# Patient Record
Sex: MALE | Race: WHITE | NOT HISPANIC OR LATINO | Employment: OTHER | URBAN - METROPOLITAN AREA
[De-identification: names, ages, dates, MRNs, and addresses within clinical notes are randomized per-mention and may not be internally consistent; named-entity substitution may affect disease eponyms.]

---

## 2021-01-01 ENCOUNTER — PATIENT OUTREACH (OUTPATIENT)
Dept: HEALTH INFORMATION MANAGEMENT | Facility: OTHER | Age: 86
End: 2021-01-01

## 2021-01-01 NOTE — PROGRESS NOTES
Outcome: Left Message to call back to establish with a [facility] PCP for his SCP plan.    Please transfer to Patient Outreach Team at [phone] when patient returns call.    Attempt # 1

## 2021-01-13 DIAGNOSIS — Z23 NEED FOR VACCINATION: ICD-10-CM

## 2021-02-05 ENCOUNTER — TELEMEDICINE (OUTPATIENT)
Dept: MEDICAL GROUP | Facility: LAB | Age: 86
End: 2021-02-05
Payer: MEDICARE

## 2021-02-05 VITALS — HEART RATE: 58 BPM | WEIGHT: 150 LBS | HEIGHT: 69 IN | BODY MASS INDEX: 22.22 KG/M2

## 2021-02-05 DIAGNOSIS — E03.9 HYPOTHYROIDISM, UNSPECIFIED TYPE: ICD-10-CM

## 2021-02-05 DIAGNOSIS — Z13.6 SCREENING FOR CARDIOVASCULAR CONDITION: ICD-10-CM

## 2021-02-05 DIAGNOSIS — C61 PROSTATE CA (HCC): ICD-10-CM

## 2021-02-05 DIAGNOSIS — Z86.79 HISTORY OF IRREGULAR HEARTBEAT: ICD-10-CM

## 2021-02-05 DIAGNOSIS — Z12.5 ENCOUNTER FOR SCREENING FOR MALIGNANT NEOPLASM OF PROSTATE: ICD-10-CM

## 2021-02-05 PROBLEM — C79.51 SECONDARY ADENOCARCINOMA OF BONE (HCC): Status: ACTIVE | Noted: 2019-11-11

## 2021-02-05 PROCEDURE — 99204 OFFICE O/P NEW MOD 45 MIN: CPT | Mod: 95,CR | Performed by: FAMILY MEDICINE

## 2021-02-05 RX ORDER — ESCITALOPRAM OXALATE 10 MG/1
10 TABLET ORAL
COMMUNITY
Start: 2021-01-24 | End: 2022-12-22

## 2021-02-05 NOTE — LETTER
[facility]  Mohsen Flores M.D.  [address]  [place] [address]  Fax: [phone]   Authorization for Release/Disclosure of   Protected Health Information   Name: ARACELIS SHRESTHA : 1935 SSN: [id number]   Address: [facility] [address]  [place] [address] Phone:    [phone] (home)    I authorize the entity listed below to release/disclose the PHI below to:   [facility]/Mohsen Flores M.D. and Mohsen Flores M.D.   Provider or Entity Name:  Dr. Ruma Krishnan  [facility]   Address   [facility]  [address]  [facility] [address] Phone:  [phone]    Fax:  [phone]   Reason for request: continuity of care   Information to be released:    [  ] LAST COLONOSCOPY,  including any PATH REPORT and follow-up  [  ] LAST FIT/COLOGUARD RESULT [  ] LAST DEXA  [  ] LAST MAMMOGRAM  [  ] LAST PAP  [  ] LAST LABS [  ] RETINA EXAM REPORT  [  ] IMMUNIZATION RECORDS  [XXXXX] Release all info      [XXXXX] Check here and initial the line next to each item to release ALL health information INCLUDING  _XXXXX_ Care and treatment for drug and / or alcohol abuse  _XXXXX_ HIV testing, infection status, or AIDS  _XXXXX_ Genetic Testing    DATES OF SERVICE OR TIME PERIOD TO BE DISCLOSED: _____________  I understand and acknowledge that:  * This Authorization may be revoked at any time by you in writing, except if your health information has already been used or disclosed.  * Your health information that will be used or disclosed as a result of you signing this authorization could be re-disclosed by the recipient. If this occurs, your re-disclosed health information may no longer be protected by State or Federal laws.  * You may refuse to sign this Authorization. Your refusal will not affect your ability to obtain treatment.  * This Authorization becomes effective upon signing and will  on (date) __________.      If no date is  indicated, this Authorization will  one (1) year from the signature date.    Name: David Darling    Signature: Continuity of care  Verbal consent given during virtual video appt today Date:     2021       PLEASE FAX REQUESTED RECORDS BACK TO: [phone]

## 2021-02-05 NOTE — LETTER
[place]  Mohsen Flores M.D.  [address]  [place] [address]  Fax: [phone]   Authorization for Release/Disclosure of   Protected Health Information   Name: ARACELIS SHRESTHA : 1935 SSN: [id number]   Address: [facility] [address]  [place] [address] Phone:    [phone] (home)    I authorize the entity listed below to release/disclose the PHI below to:   [facility]/Mohsen Flores M.D. and Mohsen Flores M.D.   Provider or Entity Name:  [facility]   Address   City, State, [facility]   Phone:  [phone]    Fax:  [phone]   Reason for request: continuity of care   Information to be released:    [  ] LAST COLONOSCOPY,  including any PATH REPORT and follow-up  [  ] LAST FIT/COLOGUARD RESULT [  ] LAST DEXA  [  ] LAST MAMMOGRAM  [  ] LAST PAP  [  ] LAST LABS [  ] RETINA EXAM REPORT  [  ] IMMUNIZATION RECORDS  [XXXXX] Release all info      [XXXXX] Check here and initial the line next to each item to release ALL health information INCLUDING  _XXXXX_ Care and treatment for drug and / or alcohol abuse  _XXXXX_ HIV testing, infection status, or AIDS  _XXXXX_ Genetic Testing    DATES OF SERVICE OR TIME PERIOD TO BE DISCLOSED: _____________  I understand and acknowledge that:  * This Authorization may be revoked at any time by you in writing, except if your health information has already been used or disclosed.  * Your health information that will be used or disclosed as a result of you signing this authorization could be re-disclosed by the recipient. If this occurs, your re-disclosed health information may no longer be protected by State or Federal laws.  * You may refuse to sign this Authorization. Your refusal will not affect your ability to obtain treatment.  * This Authorization becomes effective upon signing and will  on (date) __________.      If no date is indicated, this Authorization will  one (1) year from the signature date.    Name:  David Darling    Signature: Continuity of care  Verbal consent given during virtual video appt today Date:     2/5/2021       PLEASE FAX REQUESTED RECORDS BACK TO: [phone]

## 2021-02-05 NOTE — PROGRESS NOTES
Virtual Visit: New Patient   This visit was conducted via Zoom using secure and encrypted videoconferencing technology. The patient was in a private location in the state of [place].    The patient's identity was confirmed and verbal consent was obtained for this virtual visit.    Subjective:     CC:   Chief Complaint   Patient presents with   • [facility]       David Darling is a 85 y.o. male presenting to establish care and to discuss the evaluation and management of:    Prostate CA  Following with urology - Dr. Bragg    History of irregular heartbeat   Unclear history. Reports they wanted to do pacemaker at [facility] in 2019 but he did not want to do this.  Reports he occasionally stil has irregular heart reate, no chest pin, no shortness of breath.  Also reports ascending aortic aneurysm   Has not followed with cardiology since then  On 81 mg ASA    Reports normal colonoscopy within last 3 years    ROS  Constitutional: Negative for fever, chills and malaise/fatigue.   HENT: Negative for congestion.    Eyes: Negative for pain.   Respiratory: Negative for cough and shortness of breath.    Cardiovascular: Negative for leg swelling.   Gastrointestinal: Negative for nausea, vomiting, abdominal pain and diarrhea.   Genitourinary: Negative for dysuria and hematuria.   Skin: Negative for rash.   Neurological: Negative for dizziness, focal weakness and headaches.   Endo/Heme/Allergies: Does not bruise/bleed easily.   Psychiatric/Behavioral: Negative for depression.  The patient is not nervous/anxious.      Allergies   Allergen Reactions   • Augmentin Vomiting   • Amoxicillin-Pot Clavulanate Unspecified       Current medicines (including changes today)  Current Outpatient Medications   Medication Sig Dispense Refill   • Apalutamide 60 MG Tab Erleada 60 mg tablet   Take 4 tablets every day by oral route for 30 days.     • albuterol (VENTOLIN OR PROVENTIL) 108 (90 BASE) MCG/ACT AERS Inhale 2 Puffs by mouth  "every 6 hours as needed for Shortness of Breath. 1 Inhaler 0   • levothyroxine (SYNTHROID) 75 MCG TABS Take 1 Tab by mouth every day. 90 Tab 0   • Diclofenac Sodium (VOLTAREN) 1 % GEL Apply 4 g to skin as directed 3 times a day as needed. 1 Tube 0   • Tadalafil (CIALIS) 2.5 MG TABS Take 1 Tab by mouth as needed (30 minutes prior to sexual activity). 30 Tab 1   • fluticasone (FLONASE) 50 MCG/ACT nasal spray Spray 1 Spray in nose every day. Each Nostril 1 Bottle 1   • tamsulosin (FLOMAX) 0.4 MG capsule Take 0.4 mg by mouth 2 Times a Day.       No current facility-administered medications for this visit.        He  has a past medical history of Allergy, Cancer (HCC), Muscle disorder, Sinusitis, chronic, and Strep throat.  He  has a past surgical history that includes knee arthroscopy.      Family History   Problem Relation Age of Onset   • Cancer Mother    • Heart Disease Father    • Cancer Father    • Stroke Sister      Family Status   Relation Name Status   • Mo     • Fa     • Sis     • Bro          Car accident   • MGMo     • MGFa     • PGMo     • PGFa         Patient Active Problem List    Diagnosis Date Noted   • DDD (degenerative disc disease), lumbar 2013   • Actinic keratoses 2011   • Decreased libido 2011   • Lumbar foraminal stenosis 2011   • Elevated PSA 10/10/2010   • GERD (gastroesophageal reflux disease) 10/10/2010   • Seasonal allergies 2010          Objective:   Pulse (!) 58 Comment: Verbal  Ht 1.753 m (5' 9\") Comment: Verbal  Wt 68 kg (150 lb) Comment: Verbal  BMI 22.15 kg/m²     Physical Exam:  Constitutional: Alert, no distress, well-groomed.  Skin: No rashes in visible areas.  Eye: Round. Conjunctiva clear, lids normal. No icterus.   ENMT: Lips pink without lesions, good dentition, moist mucous membranes. Phonation normal.  Neck: No masses, no thyromegaly. Moves freely without pain.  Respiratory: " Unlabored respiratory effort, no cough or audible wheeze  Psych: Alert and oriented x3, normal affect and mood.       Assessment and Plan:   The following treatment plan was discussed:     1. Prostate CA (HCC)  Encounter for screening for malignant neoplasm of prostate   Following with urology weekly.  Currently on apalutamide.  Reports he needs a PSA checked.  - CBC WITH DIFFERENTIAL; Future  - Comp Metabolic Panel; Future  - PROSTATE SPECIFIC AG SCREENING; Future    2. History of irregular heartbeat  Unclear history.  Reports he was hospitalized in 2019 at [facility] and they wanted to do pacemaker but not want to do this.  Reports continued occasional irregular heartbeat, no chest pain or shortness of breath.  We have requested records    3. Hypothyroidism, unspecified type  Continue Synthroid 75 mcg, checking TSH.  - TSH; Future    4. Screening for cardiovascular condition  - Lipid Profile; Future    Follow-up: Return in about 4 weeks (around 3/5/2021).

## 2021-02-05 NOTE — LETTER
[facility]  Mohsen Flores M.D.  [address]  [place] [address]  Fax: [phone]   Authorization for Release/Disclosure of   Protected Health Information   Name: ARACELIS SHRESTHA : 1935 SSN: [id number]   Address: [facility] [address]  [place] [address] Phone:    [phone] (home)    I authorize the entity listed below to release/disclose the PHI below to:   [facility]/Mohsen Flores M.D. and Mohsen Flores M.D.   Provider or Entity Name:  [facility]   City, [facility]  [address]  [place] [address] Phone:  [phone]    Fax:  [phone]   Reason for request: continuity of care   Information to be released:    [  ] LAST COLONOSCOPY,  including any PATH REPORT and follow-up  [  ] LAST FIT/COLOGUARD RESULT [  ] LAST DEXA  [  ] LAST MAMMOGRAM  [  ] LAST PAP  [  ] LAST LABS [  ] RETINA EXAM REPORT  [  ] IMMUNIZATION RECORDS  [XXXXX] Release all info      [XXXXX] Check here and initial the line next to each item to release ALL health information INCLUDING  _XXXXX_ Care and treatment for drug and / or alcohol abuse  _XXXXX_ HIV testing, infection status, or AIDS  _XXXXX_ Genetic Testing    DATES OF SERVICE OR TIME PERIOD TO BE DISCLOSED: _____________  I understand and acknowledge that:  * This Authorization may be revoked at any time by you in writing, except if your health information has already been used or disclosed.  * Your health information that will be used or disclosed as a result of you signing this authorization could be re-disclosed by the recipient. If this occurs, your re-disclosed health information may no longer be protected by State or Federal laws.  * You may refuse to sign this Authorization. Your refusal will not affect your ability to obtain treatment.  * This Authorization becomes effective upon signing and will  on (date) __________.      If no date is indicated, this Authorization  will  one (1) year from the signature date.    Name: David Darling    Signature: Continuity of care  Verbal Consent given during virtual video appt today Date:     2021       PLEASE FAX REQUESTED RECORDS BACK TO: [phone]

## 2021-02-24 ENCOUNTER — TELEPHONE (OUTPATIENT)
Dept: MEDICAL GROUP | Facility: LAB | Age: 86
End: 2021-02-24

## 2021-02-24 NOTE — TELEPHONE ENCOUNTER
1. Caller Name: David Darling                        Call Back Number: [phone]      How would the patient prefer to be contacted with a response: Phone call OK to leave a detailed message    Patient called and stated that he's been monitoring his pulse more often and notices that it has been getting lower when he's at home compared to when he's at the doctor's office. Patient did state that he slept most of the day yesterday and is not sure if that has anything to do with it. Patient does have an appointment with you on 03/05/2021 at 2pm to follow up form his first visit. Patient would like to know what he should do until then or if we should move the appointment up sooner.   Please advise.

## 2021-02-24 NOTE — TELEPHONE ENCOUNTER
Called and advised patient. Patient was advised of closest ER an UC locations to his home if anything got worse over the weekend. Patient was also advised to reach out to PCP.

## 2021-03-05 ENCOUNTER — APPOINTMENT (OUTPATIENT)
Dept: MEDICAL GROUP | Facility: LAB | Age: 86
End: 2021-03-05
Payer: MEDICARE

## 2021-03-08 ENCOUNTER — HOSPITAL ENCOUNTER (OUTPATIENT)
Dept: LAB | Facility: MEDICAL CENTER | Age: 86
End: 2021-03-08
Attending: PHYSICIAN ASSISTANT
Payer: MEDICARE

## 2021-03-08 ENCOUNTER — HOSPITAL ENCOUNTER (OUTPATIENT)
Dept: LAB | Facility: MEDICAL CENTER | Age: 86
End: 2021-03-08
Attending: FAMILY MEDICINE
Payer: MEDICARE

## 2021-03-08 DIAGNOSIS — C61 PROSTATE CA (HCC): ICD-10-CM

## 2021-03-08 DIAGNOSIS — Z13.6 SCREENING FOR CARDIOVASCULAR CONDITION: ICD-10-CM

## 2021-03-08 DIAGNOSIS — E03.9 HYPOTHYROIDISM, UNSPECIFIED TYPE: ICD-10-CM

## 2021-03-08 DIAGNOSIS — Z12.5 ENCOUNTER FOR SCREENING FOR MALIGNANT NEOPLASM OF PROSTATE: ICD-10-CM

## 2021-03-08 LAB
ALBUMIN SERPL BCP-MCNC: 4.2 G/DL (ref 3.2–4.9)
ALBUMIN/GLOB SERPL: 1.8 G/DL
ALP SERPL-CCNC: 77 U/L (ref 30–99)
ALT SERPL-CCNC: 8 U/L (ref 2–50)
ANION GAP SERPL CALC-SCNC: 10 MMOL/L (ref 7–16)
AST SERPL-CCNC: 13 U/L (ref 12–45)
BASOPHILS # BLD AUTO: 0.6 % (ref 0–1.8)
BASOPHILS # BLD: 0.03 K/UL (ref 0–0.12)
BILIRUB SERPL-MCNC: 0.5 MG/DL (ref 0.1–1.5)
BUN SERPL-MCNC: 21 MG/DL (ref 8–22)
CALCIUM SERPL-MCNC: 9.4 MG/DL (ref 8.4–10.2)
CHLORIDE SERPL-SCNC: 104 MMOL/L (ref 96–112)
CHOLEST SERPL-MCNC: 139 MG/DL (ref 100–199)
CO2 SERPL-SCNC: 26 MMOL/L (ref 20–33)
CREAT SERPL-MCNC: 0.89 MG/DL (ref 0.5–1.4)
EOSINOPHIL # BLD AUTO: 0.2 K/UL (ref 0–0.51)
EOSINOPHIL NFR BLD: 4.1 % (ref 0–6.9)
ERYTHROCYTE [DISTWIDTH] IN BLOOD BY AUTOMATED COUNT: 44.5 FL (ref 35.9–50)
GLOBULIN SER CALC-MCNC: 2.4 G/DL (ref 1.9–3.5)
GLUCOSE SERPL-MCNC: 104 MG/DL (ref 65–99)
HCT VFR BLD AUTO: 37.8 % (ref 42–52)
HDLC SERPL-MCNC: 63 MG/DL
HGB BLD-MCNC: 12.5 G/DL (ref 14–18)
IMM GRANULOCYTES # BLD AUTO: 0.02 K/UL (ref 0–0.11)
IMM GRANULOCYTES NFR BLD AUTO: 0.4 % (ref 0–0.9)
LDLC SERPL CALC-MCNC: 65 MG/DL
LYMPHOCYTES # BLD AUTO: 1.01 K/UL (ref 1–4.8)
LYMPHOCYTES NFR BLD: 20.7 % (ref 22–41)
MCH RBC QN AUTO: 30.7 PG (ref 27–33)
MCHC RBC AUTO-ENTMCNC: 33.1 G/DL (ref 33.7–35.3)
MCV RBC AUTO: 92.9 FL (ref 81.4–97.8)
MONOCYTES # BLD AUTO: 0.66 K/UL (ref 0–0.85)
MONOCYTES NFR BLD AUTO: 13.6 % (ref 0–13.4)
NEUTROPHILS # BLD AUTO: 2.95 K/UL (ref 1.82–7.42)
NEUTROPHILS NFR BLD: 60.6 % (ref 44–72)
NRBC # BLD AUTO: 0 K/UL
NRBC BLD-RTO: 0 /100 WBC
PLATELET # BLD AUTO: 224 K/UL (ref 164–446)
PMV BLD AUTO: 12.8 FL (ref 9–12.9)
POTASSIUM SERPL-SCNC: 4.5 MMOL/L (ref 3.6–5.5)
PROT SERPL-MCNC: 6.6 G/DL (ref 6–8.2)
RBC # BLD AUTO: 4.07 M/UL (ref 4.7–6.1)
SODIUM SERPL-SCNC: 140 MMOL/L (ref 135–145)
TESTOST SERPL-MCNC: <10 NG/DL (ref 175–781)
TRIGL SERPL-MCNC: 56 MG/DL (ref 0–149)
TSH SERPL DL<=0.005 MIU/L-ACNC: 6.93 UIU/ML (ref 0.38–5.33)
WBC # BLD AUTO: 4.9 K/UL (ref 4.8–10.8)

## 2021-03-08 PROCEDURE — 84403 ASSAY OF TOTAL TESTOSTERONE: CPT

## 2021-03-08 PROCEDURE — 85025 COMPLETE CBC W/AUTO DIFF WBC: CPT

## 2021-03-08 PROCEDURE — 36415 COLL VENOUS BLD VENIPUNCTURE: CPT

## 2021-03-08 PROCEDURE — 84443 ASSAY THYROID STIM HORMONE: CPT

## 2021-03-08 PROCEDURE — 84153 ASSAY OF PSA TOTAL: CPT

## 2021-03-08 PROCEDURE — 80061 LIPID PANEL: CPT

## 2021-03-08 PROCEDURE — 80053 COMPREHEN METABOLIC PANEL: CPT

## 2021-03-09 ENCOUNTER — TELEPHONE (OUTPATIENT)
Dept: MEDICAL GROUP | Facility: LAB | Age: 86
End: 2021-03-09

## 2021-03-09 LAB — PSA SERPL-MCNC: <0.02 NG/ML (ref 0–4)

## 2021-03-09 NOTE — TELEPHONE ENCOUNTER
ESTABLISHED PATIENT PRE-VISIT PLANNING     Patient was NOT contacted to complete PVP.     Note: Patient will not be contacted if there is no indication to call.     1.  Reviewed notes from the last few office visits within the medical group: Yes    2.  If any orders were placed at last visit or intended to be done for this visit (i.e. 6 mos follow-up), do we have Results/Consult Notes?         •  Labs - Labs ordered, completed on 3/8/2021 and results are in chart.  Note: If patient appointment is for lab review and patient did not complete labs, check with provider if OK to reschedule patient until labs completed.       •  Imaging - Imaging was not ordered at last office visit.       •  Referrals - No referrals were ordered at last office visit.    3. Is this appointment scheduled as a Hospital Follow-Up? No    4.  Immunizations were updated in Epic using Reconcile Outside Information activity? Yes    5.  Patient is due for the following Health Maintenance Topics:   Health Maintenance Due   Topic Date Due   • Annual Wellness Visit  Never done   • IMM DTaP/Tdap/Td Vaccine (1 - Tdap) Never done   • IMM ZOSTER VACCINES (1 of 2) Never done   • IMM PNEUMOCOCCAL VACCINE: 65+ Years (1 of 1 - PPSV23) Never done   • COVID-19 Vaccine (2 of 2 - Moderna series) 03/13/2021       6.  AHA (Pulse8) form printed for Provider?Yes, printed

## 2021-03-16 ENCOUNTER — TELEMEDICINE (OUTPATIENT)
Dept: MEDICAL GROUP | Facility: LAB | Age: 86
End: 2021-03-16
Payer: MEDICARE

## 2021-03-16 VITALS — HEART RATE: 48 BPM | BODY MASS INDEX: 22.73 KG/M2 | WEIGHT: 150 LBS | HEIGHT: 68 IN

## 2021-03-16 DIAGNOSIS — E03.9 HYPOTHYROIDISM, UNSPECIFIED TYPE: ICD-10-CM

## 2021-03-16 DIAGNOSIS — D64.9 NORMOCYTIC ANEMIA: ICD-10-CM

## 2021-03-16 DIAGNOSIS — C61 PROSTATE CA (HCC): ICD-10-CM

## 2021-03-16 DIAGNOSIS — R00.1 BRADYCARDIA: ICD-10-CM

## 2021-03-16 DIAGNOSIS — C79.51 SECONDARY ADENOCARCINOMA OF BONE (HCC): ICD-10-CM

## 2021-03-16 PROCEDURE — 99214 OFFICE O/P EST MOD 30 MIN: CPT | Mod: 95,CR | Performed by: FAMILY MEDICINE

## 2021-03-16 RX ORDER — LEVOTHYROXINE SODIUM 88 UG/1
88 TABLET ORAL
Qty: 30 TABLET | Refills: 1 | Status: SHIPPED | OUTPATIENT
Start: 2021-03-16 | End: 2021-04-12

## 2021-03-16 ASSESSMENT — FIBROSIS 4 INDEX: FIB4 SCORE: 1.72

## 2021-03-16 NOTE — PROGRESS NOTES
Annual Health Assessment Questions:    1.  Are you currently engaging in any exercise or physical activity? Yes  Stretches every morning  2.  How would you describe your mood or emotional well-being today? depressed    3.  Have you had any falls in the last year? No    4.  Have you noticed any problems with your balance or had difficulty walking? Yes  On occasion  5.  In the last six months have you experienced any leakage of urine? No    6. DPA/Advanced Directive: Patient does not have an Advanced Directive.  A packet and workshop information was given on Advanced Directives.   Packet will be mailed to patient in the mail per his request

## 2021-03-16 NOTE — PROGRESS NOTES
Virtual Visit: Established Patient   This visit was conducted via Zoom using secure and encrypted videoconferencing technology. The patient was in a private location in the [facility] [place].    The patient's identity was confirmed and verbal consent was obtained for this virtual visit.    Subjective:   CC:   Chief Complaint   Patient presents with   • Lab Results   • Medication Management     Possible increase of levothyroxine   • Seasonal Allergies       David Darling is a 85 y.o. male presenting for evaluation and management of:    Hypothyroid  TSH elevated at 6.9.  He has felt fatigued, cold, constipated.  Has been stable on 75 mcg Synthroid for some time.    Prostate cancer  Following with urology, does have bone metastases.  He is currently on Erleada    Bradycardia  Home pulse 48 today, he has been stable in the 50s for some time.  Possible mild lightheadedness.  He reports that cardiology may have recommended pacemaker in the past.    Previous records from hospitalization at [facility] in 2019 showed instance of A. fib but spontaneously resolved and did not recur.  Had a normal echo and stress test.  They recommended outpatient cardiology follow-up.    ROS   No chest pain, no shortness of breath.    Allergies   Allergen Reactions   • Augmentin Vomiting   • Amoxicillin-Pot Clavulanate Unspecified       Current medicines (including changes today)  Current Outpatient Medications   Medication Sig Dispense Refill   • Apalutamide 60 MG Tab Erleada 60 mg tablet   Take 4 tablets every day by oral route for 30 days.     • escitalopram (LEXAPRO) 10 MG Tab Take 10 mg by mouth every day.     • albuterol (VENTOLIN OR PROVENTIL) 108 (90 BASE) MCG/ACT AERS Inhale 2 Puffs by mouth every 6 hours as needed for Shortness of Breath. 1 Inhaler 0   • levothyroxine (SYNTHROID) 75 MCG TABS Take 1 Tab by mouth every day. 90 Tab 0   • Diclofenac Sodium (VOLTAREN) 1 % GEL Apply 4 g to skin as directed 3 times a day as needed.  "1 Tube 0   • Tadalafil (CIALIS) 2.5 MG TABS Take 1 Tab by mouth as needed (30 minutes prior to sexual activity). 30 Tab 1   • fluticasone (FLONASE) 50 MCG/ACT nasal spray Spray 1 Spray in nose every day. Each Nostril 1 Bottle 1   • tamsulosin (FLOMAX) 0.4 MG capsule Take 0.4 mg by mouth 2 Times a Day.       No current facility-administered medications for this visit.       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  He  has a past surgical history that includes knee arthroscopy.       Objective:   Pulse (!) 48 Comment: Verbal  Ht 1.727 m (5' 8\") Comment: Verbal  Wt 68 kg (150 lb) Comment: Verbal  BMI 22.81 kg/m²     Physical Exam:  Constitutional: Alert, no distress, well-groomed.  Skin: No rashes in visible areas.   Eye: Round. Conjunctiva clear, lids normal. No icterus.   ENMT: Lips pink without lesions, good dentition, moist mucous membranes. Phonation normal.  Neck: No masses, no thyromegaly. Moves freely without pain.  Respiratory: Unlabored respiratory effort, no cough or audible wheeze  Psych: Alert and oriented x3, normal affect and mood.       Assessment and Plan:   The following treatment plan was discussed:     1. 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  Continues on Erleada.    3. Hypothyroidism, unspecified type  Recent " TSH elevated and symptomatic with fatigue, cold intolerance, and constipation.  Increasing Synthroid to 88 mcg daily, recheck TSH in 4 weeks.  - levothyroxine (SYNTHROID) 88 MCG Tab; Take 1 tablet by mouth Every morning on an empty stomach.  Dispense: 30 tablet; Refill: 1  - TSH; Future    4. Normocytic anemia  Hemoglobin 12.5, no prior for comparison.  Suspect likely secondary to bony mets or Erleada.  We will continue to monitor.  - CBC WITH DIFFERENTIAL; Future    5. Bradycardia  Home pulse 48 today.  Have not had a recent in person pulse.  Possible mild lightheadedness.  He does report that cardiology may have recommended pacemaker in the past.  He will schedule in person visit for heart rate check and possible EKG and we can consider cardiology referral at that time.    Follow-up: Return in about 2 weeks (around 3/30/2021).

## 2021-03-17 ENCOUNTER — TELEPHONE (OUTPATIENT)
Dept: MEDICAL GROUP | Facility: LAB | Age: 86
End: 2021-03-17

## 2021-03-17 NOTE — TELEPHONE ENCOUNTER
ESTABLISHED PATIENT PRE-VISIT PLANNING     Patient was NOT contacted to complete PVP.     Note: Patient will not be contacted if there is no indication to call.     1.  Reviewed notes from the last few office visits within the medical group: Yes    2.  If any orders were placed at last visit or intended to be done for this visit (i.e. 6 mos follow-up), do we have Results/Consult Notes?         •  Labs - Labs ordered, NOT completed. Patient advised to complete prior to next appointment.  Note: If patient appointment is for lab review and patient did not complete labs, check with provider if OK to reschedule patient until labs completed.       •  Imaging - Imaging was not ordered at last office visit.       •  Referrals - No referrals were ordered at last office visit.    3. Is this appointment scheduled as a Hospital Follow-Up? No    4.  Immunizations were updated in Epic using Reconcile Outside Information activity? Yes    5.  Patient is due for the following Health Maintenance Topics:   Health Maintenance Due   Topic Date Due   • Annual Wellness Visit  Never done   • IMM DTaP/Tdap/Td Vaccine (1 - Tdap) Never done   • IMM ZOSTER VACCINES (1 of 2) Never done   • IMM PNEUMOCOCCAL VACCINE: 65+ Years (1 of 1 - PPSV23) Never done   • COVID-19 Vaccine (2 of 2 - Moderna series) 03/13/2021         6.  AHA (Pulse8) form printed for Provider? No, already completed 3/16/21

## 2021-03-22 ENCOUNTER — HOSPITAL ENCOUNTER (OUTPATIENT)
Dept: LAB | Facility: MEDICAL CENTER | Age: 86
End: 2021-03-22
Attending: FAMILY MEDICINE
Payer: MEDICARE

## 2021-03-22 DIAGNOSIS — D64.9 NORMOCYTIC ANEMIA: ICD-10-CM

## 2021-03-22 DIAGNOSIS — E03.9 HYPOTHYROIDISM, UNSPECIFIED TYPE: ICD-10-CM

## 2021-03-22 LAB
BASOPHILS # BLD AUTO: 0.8 % (ref 0–1.8)
BASOPHILS # BLD: 0.04 K/UL (ref 0–0.12)
EOSINOPHIL # BLD AUTO: 0.26 K/UL (ref 0–0.51)
EOSINOPHIL NFR BLD: 5.3 % (ref 0–6.9)
ERYTHROCYTE [DISTWIDTH] IN BLOOD BY AUTOMATED COUNT: 44.2 FL (ref 35.9–50)
HCT VFR BLD AUTO: 37.5 % (ref 42–52)
HGB BLD-MCNC: 12.2 G/DL (ref 14–18)
IMM GRANULOCYTES # BLD AUTO: 0.01 K/UL (ref 0–0.11)
IMM GRANULOCYTES NFR BLD AUTO: 0.2 % (ref 0–0.9)
LYMPHOCYTES # BLD AUTO: 1.22 K/UL (ref 1–4.8)
LYMPHOCYTES NFR BLD: 24.9 % (ref 22–41)
MCH RBC QN AUTO: 30 PG (ref 27–33)
MCHC RBC AUTO-ENTMCNC: 32.5 G/DL (ref 33.7–35.3)
MCV RBC AUTO: 92.4 FL (ref 81.4–97.8)
MONOCYTES # BLD AUTO: 0.64 K/UL (ref 0–0.85)
MONOCYTES NFR BLD AUTO: 13.1 % (ref 0–13.4)
NEUTROPHILS # BLD AUTO: 2.73 K/UL (ref 1.82–7.42)
NEUTROPHILS NFR BLD: 55.7 % (ref 44–72)
NRBC # BLD AUTO: 0 K/UL
NRBC BLD-RTO: 0 /100 WBC
PLATELET # BLD AUTO: 239 K/UL (ref 164–446)
PMV BLD AUTO: 12.2 FL (ref 9–12.9)
RBC # BLD AUTO: 4.06 M/UL (ref 4.7–6.1)
TSH SERPL DL<=0.005 MIU/L-ACNC: 5.16 UIU/ML (ref 0.38–5.33)
WBC # BLD AUTO: 4.9 K/UL (ref 4.8–10.8)

## 2021-03-22 PROCEDURE — 85025 COMPLETE CBC W/AUTO DIFF WBC: CPT

## 2021-03-22 PROCEDURE — 84443 ASSAY THYROID STIM HORMONE: CPT

## 2021-03-22 PROCEDURE — 36415 COLL VENOUS BLD VENIPUNCTURE: CPT

## 2021-03-23 ENCOUNTER — OFFICE VISIT (OUTPATIENT)
Dept: MEDICAL GROUP | Facility: LAB | Age: 86
End: 2021-03-23
Payer: MEDICARE

## 2021-03-23 VITALS
TEMPERATURE: 97.8 F | WEIGHT: 156 LBS | OXYGEN SATURATION: 99 % | DIASTOLIC BLOOD PRESSURE: 58 MMHG | BODY MASS INDEX: 23.64 KG/M2 | HEART RATE: 60 BPM | SYSTOLIC BLOOD PRESSURE: 116 MMHG | HEIGHT: 68 IN | RESPIRATION RATE: 12 BRPM

## 2021-03-23 DIAGNOSIS — E03.9 HYPOTHYROIDISM, UNSPECIFIED TYPE: ICD-10-CM

## 2021-03-23 DIAGNOSIS — D64.9 NORMOCYTIC ANEMIA: ICD-10-CM

## 2021-03-23 DIAGNOSIS — R09.82 POST-NASAL DRIP: ICD-10-CM

## 2021-03-23 DIAGNOSIS — R00.1 BRADYCARDIA: ICD-10-CM

## 2021-03-23 PROCEDURE — 99213 OFFICE O/P EST LOW 20 MIN: CPT | Mod: 25 | Performed by: FAMILY MEDICINE

## 2021-03-23 PROCEDURE — 93000 ELECTROCARDIOGRAM COMPLETE: CPT | Performed by: FAMILY MEDICINE

## 2021-03-23 ASSESSMENT — FIBROSIS 4 INDEX: FIB4 SCORE: 1.62

## 2021-03-23 ASSESSMENT — ENCOUNTER SYMPTOMS
CHILLS: 0
SORE THROAT: 0
WHEEZING: 0
FEVER: 0
SHORTNESS OF BREATH: 0

## 2021-03-23 NOTE — PROGRESS NOTES
"Subjective:     CC: Bradycardia, nasal congestion    HPI:   David presents today with:    Bradycardia  Chronic problem, has been discussed at multiple virtual visits.  Following up today for in person exam and EKG.    He had a previous work-up at an in and see for chest pain and A. fib.  Had an episode of spontaneous A. fib that resolved and did not recur.  Had normal echo and stress test.  He continue to follow with cardiology as an outpatient.  He reports they recommended a pacemaker at one point but did not have a stable living situation did not follow-up on this.    Currently denies chest pain, shortness of breath, lightheadedness.    Nasal congestion  Reports chronic issues with continually runny nose, will symptoms drip down the back of his throat and causes mild cough.    Medications, past medical history, allergies, and social history have been reviewed and updated.    ROS:  Review of Systems   Constitutional: Negative for chills and fever.   HENT: Negative for sore throat.    Respiratory: Negative for shortness of breath and wheezing.         Objective:       Exam:  /58 (BP Location: Left arm, Patient Position: Sitting, BP Cuff Size: Adult)   Pulse 60   Temp 36.6 °C (97.8 °F)   Resp 12   Ht 1.727 m (5' 8\")   Wt 70.8 kg (156 lb)   SpO2 99%   BMI 23.72 kg/m²  Body mass index is 23.72 kg/m².    Constitutional: Alert. Well appearing. No distress.  Skin: Warm, dry, good turgor, no visible rashes.  Eye: Equal, round and reactive to light, conjunctiva clear, lids normal.  ENMT: Moist mucous membranes.   Respiratory: Normal effort. Lungs are clear to auscultation bilaterally.  Cardiovascular: Bradycardic.  Normal S1/S2. No murmurs, rubs or gallops.   Ext: Trace lower extremity edema.  Neuro: Moves all four extremities. No facial droop.  Psych: Answers questions appropriately. Normal affect and mood.    EKG interpretation by me: Sinus bradycardia. HR is 40. Axis is normal.  Prolonged MA interval.  " Probable RBBB.    Assessment & Plan:     85 y.o. male with the following -     1. Bradycardia  Chronic issue, he previously followed with cardiology at [facility].  Pacemaker may have been recommended at one point but he is not able to follow-up due to social circumstances.  EKG today consistent with sinus bradycardia.  Does not appear to have significant symptoms.  We will send referral to cardiology to discuss further treatment.    2. Hypothyroidism, unspecified type  Recently increased Synthroid dosage, will recheck TSH.  - TSH; Future    3. Normocytic anemia  Likely secondary to bony mets or early ETA.  Continue to monitor.  - CBC WITH DIFFERENTIAL; Future    4. Post-nasal drip  Trial Flonase.      Please note that this note was created using voice recognition software.

## 2021-03-25 ENCOUNTER — TELEPHONE (OUTPATIENT)
Dept: MEDICAL GROUP | Facility: LAB | Age: 86
End: 2021-03-25

## 2021-03-25 NOTE — TELEPHONE ENCOUNTER
Pt has been informed. He would like more clarification to low heart rate. He said you may have spoken to him at his visit but does not remember.

## 2021-04-06 ENCOUNTER — TELEMEDICINE (OUTPATIENT)
Dept: CARDIOLOGY | Facility: MEDICAL CENTER | Age: 86
End: 2021-04-06
Attending: FAMILY MEDICINE
Payer: MEDICARE

## 2021-04-06 VITALS — BODY MASS INDEX: 23.64 KG/M2 | WEIGHT: 156 LBS | HEIGHT: 68 IN

## 2021-04-06 DIAGNOSIS — I45.10 RBBB: ICD-10-CM

## 2021-04-06 DIAGNOSIS — R00.1 SINUS BRADYCARDIA: ICD-10-CM

## 2021-04-06 DIAGNOSIS — R00.1 BRADYCARDIA: ICD-10-CM

## 2021-04-06 PROCEDURE — 99204 OFFICE O/P NEW MOD 45 MIN: CPT | Performed by: INTERNAL MEDICINE

## 2021-04-06 RX ORDER — ASPIRIN 81 MG/1
81 TABLET ORAL EVERY EVENING
COMMUNITY
End: 2022-02-08

## 2021-04-06 ASSESSMENT — FIBROSIS 4 INDEX: FIB4 SCORE: 1.62

## 2021-04-06 NOTE — PROGRESS NOTES
Virtual Visit: New Patient   This visit was conducted via Zoom using secure and encrypted videoconferencing technology. The patient was in a private location in the state of [place].    The patient's identity was confirmed and verbal consent was obtained for this virtual visit.    Subjective:     CC:   Chief Complaint   Patient presents with   • Bradycardia   • Irregular Heart Beat       David Darling is a 85 y.o. male presenting to establish care and to discuss the evaluation and management of: bradycardia    He has metastatic prostate CA but well controlled   He was diagnosed in AF in the past  In October 2019, he was admitted at [facility]  MPI and ECHO were unremarkable.    Seen by EP at [facility] once but not lately.  There was some discussion about pacemaker.    He recently switched primary care provider.  He was physically seen in the office for the first time 2 weeks ago he was noted to be bradycardic.    EKG was obtained and by my review it shows sinus bradycardia with heart rate of 39 bpm with right bundle branch block and first-degree AV block with a KS interval of 186 bpm.  There is no prior EKG for comparison.    He denies dizziness or syncope.  He think that his energy level may be elevated low over the past 6 months but probably still able to do most of normal routine.  He has not been able to do much outdoor activities lately due to COVID-19.    ROS  See HPI  Constitutional: Negative for fever, chills.   HENT: Negative for congestion.    Eyes: Negative for pain.   Respiratory: Negative for cough and shortness of breath.    Cardiovascular: Negative for leg swelling.   Gastrointestinal: Negative for nausea, vomiting, abdominal pain and diarrhea.   Genitourinary: Negative for dysuria and hematuria.   Skin: Negative for rash.   Neurological: Negative for dizziness, focal weakness and headaches.   Endo/Heme/Allergies: Does not bruise/bleed easily.   Psychiatric/Behavioral: Negative for depression.  The patient  is not nervous/anxious.      Allergies   Allergen Reactions   • Augmentin Vomiting   • Amoxicillin-Pot Clavulanate Unspecified       Current medicines (including changes today)  Current Outpatient Medications   Medication Sig Dispense Refill   • aspirin 81 MG EC tablet Take 81 mg by mouth every evening.     • Coenzyme Q10 (CO Q 10 PO) Take  by mouth every day.     • levothyroxine (SYNTHROID) 88 MCG Tab Take 1 tablet by mouth Every morning on an empty stomach. 30 tablet 1   • Apalutamide 60 MG Tab Erleada 60 mg tablet   Take 4 tablets every day by oral route for 30 days.     • escitalopram (LEXAPRO) 10 MG Tab Take 10 mg by mouth every day.     • albuterol (VENTOLIN OR PROVENTIL) 108 (90 BASE) MCG/ACT AERS Inhale 2 Puffs by mouth every 6 hours as needed for Shortness of Breath. 1 Inhaler 0   • Diclofenac Sodium (VOLTAREN) 1 % GEL Apply 4 g to skin as directed 3 times a day as needed. 1 Tube 0   • Tadalafil (CIALIS) 2.5 MG TABS Take 1 Tab by mouth as needed (30 minutes prior to sexual activity). 30 Tab 1   • fluticasone (FLONASE) 50 MCG/ACT nasal spray Spray 1 Spray in nose every day. Each Nostril (Patient taking differently: Administer 1 Spray into affected nostril(S) as needed. Each Nostril) 1 Bottle 1   • tamsulosin (FLOMAX) 0.4 MG capsule Take 0.4 mg by mouth every day.       No current facility-administered medications for this visit.       He  has a past medical history of Allergy, Cancer (HCC), Muscle disorder, Sinusitis, chronic, and Strep throat.  He  has a past surgical history that includes knee arthroscopy.      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  Skin: No rashes in visible areas.  Eye: Round. Conjunctiva clear, lids normal. No icterus.   ENMT: Lips pink without lesions, good dentition, moist mucous membranes. Phonation normal.  Neck: No masses, no thyromegaly. Moves freely without pain.  Respiratory: Unlabored respiratory effort, no cough or audible wheeze  Psych: Alert and oriented x3, normal affect and mood.       Assessment and Plan:   The following treatment plan was discussed:     1. Bradycardia, sinus likely sick sinus syndrome  Relatively asymptomatic. He also has stage IV prostate cancer.  He is hesitant to undergo pacemaker implantation.  We will further assess his bradycardia arrhythmia with 24-hour Holter monitor.    2. 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  We will keep you posted about our findings and further recommendations as they become available. Please also do not hesitate to call for any questions.  Thank you kindly for allowing me to participate in the care of this patient.    Follow-up: No follow-ups on file.         "

## 2021-04-09 ENCOUNTER — PATIENT MESSAGE (OUTPATIENT)
Dept: HEALTH INFORMATION MANAGEMENT | Facility: OTHER | Age: 86
End: 2021-04-09

## 2021-04-12 DIAGNOSIS — E03.9 HYPOTHYROIDISM, UNSPECIFIED TYPE: ICD-10-CM

## 2021-04-13 RX ORDER — LEVOTHYROXINE SODIUM 88 UG/1
TABLET ORAL
Qty: 90 TABLET | Refills: 3 | Status: SHIPPED | OUTPATIENT
Start: 2021-04-13 | End: 2022-12-15 | Stop reason: SDUPTHER

## 2021-04-23 ENCOUNTER — PATIENT OUTREACH (OUTPATIENT)
Dept: HEALTH INFORMATION MANAGEMENT | Facility: OTHER | Age: 86
End: 2021-04-23

## 2021-04-23 NOTE — PROGRESS NOTES
Outcome:will call back to scheduled Comprehensive Geriatric Assessment    Please transfer to Patient Outreach Team at [phone] when patient returns call.    Attempt #2

## 2021-05-06 ENCOUNTER — OFFICE VISIT (OUTPATIENT)
Dept: URGENT CARE | Facility: CLINIC | Age: 86
End: 2021-05-06
Payer: MEDICARE

## 2021-05-06 ENCOUNTER — APPOINTMENT (OUTPATIENT)
Dept: RADIOLOGY | Facility: IMAGING CENTER | Age: 86
End: 2021-05-06
Attending: PHYSICIAN ASSISTANT
Payer: MEDICARE

## 2021-05-06 VITALS
RESPIRATION RATE: 16 BRPM | SYSTOLIC BLOOD PRESSURE: 118 MMHG | BODY MASS INDEX: 23.64 KG/M2 | OXYGEN SATURATION: 99 % | HEART RATE: 46 BPM | TEMPERATURE: 97.6 F | WEIGHT: 156 LBS | DIASTOLIC BLOOD PRESSURE: 60 MMHG | HEIGHT: 68 IN

## 2021-05-06 DIAGNOSIS — T14.8XXA AVULSION OF SKIN: ICD-10-CM
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
## 2021-05-06 DIAGNOSIS — S69.91XA INJURY OF RIGHT WRIST, INITIAL ENCOUNTER: ICD-10-CM

## 2021-05-06 DIAGNOSIS — S52.571A OTHER CLOSED INTRA-ARTICULAR FRACTURE OF DISTAL END OF RIGHT RADIUS, INITIAL ENCOUNTER: ICD-10-CM

## 2021-05-06 DIAGNOSIS — R00.1 BRADYCARDIA: ICD-10-CM

## 2021-05-06 PROCEDURE — 99214 OFFICE O/P EST MOD 30 MIN: CPT | Mod: 25 | Performed by: PHYSICIAN ASSISTANT

## 2021-05-06 PROCEDURE — 90715 TDAP VACCINE 7 YRS/> IM: CPT | Performed by: PHYSICIAN ASSISTANT

## 2021-05-06 PROCEDURE — 90471 IMMUNIZATION ADMIN: CPT | Performed by: PHYSICIAN ASSISTANT

## 2021-05-06 PROCEDURE — 73110 X-RAY EXAM OF WRIST: CPT | Mod: TC,FY,RT | Performed by: PHYSICIAN ASSISTANT

## 2021-05-06 RX ORDER — TRAZODONE HYDROCHLORIDE 150 MG/1
150 TABLET ORAL
COMMUNITY
Start: 2021-03-18 | End: 2021-09-01

## 2021-05-06 ASSESSMENT — ENCOUNTER SYMPTOMS
DIARRHEA: 0
FEVER: 0
CHILLS: 0
DIZZINESS: 0
VOMITING: 0
SHORTNESS OF BREATH: 0
FALLS: 1
NAUSEA: 0
ABDOMINAL PAIN: 0

## 2021-05-06 ASSESSMENT — FIBROSIS 4 INDEX: FIB4 SCORE: 1.62

## 2021-05-07 NOTE — PROGRESS NOTES
"Subjective:      David Darling is a 85 y.o. male who presents with Puncture Wound (lt hand x2 happened 4 days ago ) and Wound Check (rt arm x4 days )            HPI  Patient presents to urgent care reporting a fall that occurred 4 days ago. He tripped and fell landing on his right wrist. He reports pain and swelling of the wrist since that time. He is right hand dominant. No prior wrist injuries. No distal numbness/tingling. He also obtained a skin tear to his right forearm and also a small puncture wound on his left palm. Tdap is out of date.       Review of Systems   Constitutional: Negative for chills and fever.   HENT: Negative for congestion.    Respiratory: Negative for shortness of breath.    Cardiovascular: Negative for chest pain.   Gastrointestinal: Negative for abdominal pain, diarrhea, nausea and vomiting.   Genitourinary: Negative.    Musculoskeletal: Positive for falls.        + right wrist pain   Skin: Negative for rash.        + skin avulsion   Neurological: Negative for dizziness.        Objective:     /60   Pulse (!) 46   Temp 36.4 °C (97.6 °F) (Temporal)   Resp 16   Ht 1.727 m (5' 8\")   Wt 70.8 kg (156 lb)   SpO2 99%   BMI 23.72 kg/m²        Physical Exam  Vitals and nursing note reviewed.   Constitutional:       General: He is not in acute distress.     Appearance: Normal appearance. He is well-developed.   HENT:      Head: Normocephalic and atraumatic.      Right Ear: External ear normal.      Left Ear: External ear normal.      Nose: Nose normal.   Eyes:      Conjunctiva/sclera: Conjunctivae normal.   Cardiovascular:      Rate and Rhythm: Normal rate.   Pulmonary:      Effort: Pulmonary effort is normal.   Musculoskeletal:      Right wrist: Tenderness and bony tenderness present. No snuff box tenderness. Decreased range of motion.      Cervical back: Normal range of motion.      Comments: + TTP over distal aspect of right radius. Distally n/v intact.    Skin:     General: Skin " is warm and dry.             Comments: Superficial skin tear over extensor aspect of right forearm.     Superficial puncture wound on right palm.    Neurological:      Mental Status: He is alert and oriented to person, place, and time.   Psychiatric:         Behavior: Behavior normal.          PMH:  has a past medical history of Allergy, Cancer (HCC), Muscle disorder, Sinusitis, chronic, and Strep throat.  MEDS:   Current Outpatient Medications:   •  traZODone (DESYREL) 150 MG Tab, Take 150 mg by mouth., Disp: , Rfl:   •  levothyroxine (SYNTHROID) 88 MCG Tab, TAKE 1 TABLET BY MOUTH EVERY DAY IN THE MORNING ON AN EMPTY STOMACH, Disp: 90 tablet, Rfl: 3  •  aspirin 81 MG EC tablet, Take 81 mg by mouth every evening., Disp: , Rfl:   •  Coenzyme Q10 (CO Q 10 PO), Take  by mouth every day., Disp: , Rfl:   •  Apalutamide 60 MG Tab, Erleada 60 mg tablet  Take 4 tablets every day by oral route for 30 days., Disp: , Rfl:   •  escitalopram (LEXAPRO) 10 MG Tab, Take 10 mg by mouth every day., Disp: , Rfl:   •  albuterol (VENTOLIN OR PROVENTIL) 108 (90 BASE) MCG/ACT AERS, Inhale 2 Puffs by mouth every 6 hours as needed for Shortness of Breath., Disp: 1 Inhaler, Rfl: 0  •  Diclofenac Sodium (VOLTAREN) 1 % GEL, Apply 4 g to skin as directed 3 times a day as needed., Disp: 1 Tube, Rfl: 0  •  Tadalafil (CIALIS) 2.5 MG TABS, Take 1 Tab by mouth as needed (30 minutes prior to sexual activity)., Disp: 30 Tab, Rfl: 1  •  fluticasone (FLONASE) 50 MCG/ACT nasal spray, Spray 1 Spray in nose every day. Each Nostril (Patient taking differently: Administer 1 Spray into affected nostril(S) as needed. Each Nostril), Disp: 1 Bottle, Rfl: 1  •  tamsulosin (FLOMAX) 0.4 MG capsule, Take 0.4 mg by mouth every day., Disp: , Rfl:   ALLERGIES:   Allergies   Allergen Reactions   • Augmentin Vomiting   • Amoxicillin-Pot Clavulanate Unspecified     SURGHX:   Past Surgical History:   Procedure Laterality Date   • KNEE ARTHROSCOPY       SOCHX:  reports that  he has never smoked. He has never used smokeless tobacco. He reports that he does not drink alcohol and does not use drugs.  FH: family history includes Cancer in his father and mother; Heart Disease in his father; Stroke in his sister.       Assessment/Plan:        1. Other closed intra-articular fracture of distal end of right radius, initial encounter  -  AMA/Refusal of Treatment    - DX-WRIST-COMPLETE 3+ RIGHT; Future  IMPRESSION:     1.  Intra-articular fracture at the dorsal aspect distal RIGHT radius.  2.  Diffuse swelling about the wrist.  3.  Widening of scapholunate space concerning for ligamentous injury.  4.  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    - REFERRAL TO ORTHOPEDICS     Patient declined radial gutter splint at today's visit after long discussion. He is concerned about being able to handle simple tasks at home because he lives alone. Discussed poor healing outcomes including delayed healing and development of secondary arthritis. He agreed to a thumb spica brace today and will follow up with orthopedics for further evaluation and management. The patient demonstrated a good understanding and agreed with the treatment plan.        2. Avulsion of skin    - Tdap =>6yo IM    Wounds appear to be healing well and don't appear infected. Encouraged washing daily with warm soap and water, apply topical polysporin daily until fully healed over.       3. Fall, initial encounter      4. Bradycardia      Discussed bradycardia at today's visit, this has been a longstanding issue and his cardiologist has recommend pacemaker insertion. He will follow up with cardiology for ongoing management.

## 2021-07-19 ENCOUNTER — HOSPITAL ENCOUNTER (OUTPATIENT)
Dept: LAB | Facility: MEDICAL CENTER | Age: 86
End: 2021-07-19
Attending: PHYSICIAN ASSISTANT
Payer: MEDICARE

## 2021-07-19 LAB — TESTOST SERPL-MCNC: <10 NG/DL (ref 175–781)

## 2021-07-19 PROCEDURE — 36415 COLL VENOUS BLD VENIPUNCTURE: CPT

## 2021-07-19 PROCEDURE — 84403 ASSAY OF TOTAL TESTOSTERONE: CPT

## 2021-09-01 ENCOUNTER — OFFICE VISIT (OUTPATIENT)
Dept: CARDIOLOGY | Facility: MEDICAL CENTER | Age: 86
End: 2021-09-01
Payer: MEDICARE

## 2021-09-01 VITALS
HEIGHT: 68 IN | BODY MASS INDEX: 24.55 KG/M2 | HEART RATE: 37 BPM | SYSTOLIC BLOOD PRESSURE: 138 MMHG | DIASTOLIC BLOOD PRESSURE: 84 MMHG | WEIGHT: 162 LBS | OXYGEN SATURATION: 93 %

## 2021-09-01 DIAGNOSIS — R00.1 SINUS BRADYCARDIA: ICD-10-CM

## 2021-09-01 DIAGNOSIS — R00.1 BRADYCARDIA, SINUS, PERSISTENT, SEVERE: ICD-10-CM

## 2021-09-01 LAB — EKG IMPRESSION: NORMAL

## 2021-09-01 PROCEDURE — 99205 OFFICE O/P NEW HI 60 MIN: CPT | Mod: 25 | Performed by: INTERNAL MEDICINE

## 2021-09-01 PROCEDURE — 93000 ELECTROCARDIOGRAM COMPLETE: CPT | Performed by: INTERNAL MEDICINE

## 2021-09-01 ASSESSMENT — FIBROSIS 4 INDEX: FIB4 SCORE: 1.63

## 2021-09-01 NOTE — PROGRESS NOTES
Arrhythmia Clinic Note (New patient)     DOS: 9/1/2021    Referring physician: Dr Reid    Chief complaint/Reason for consult: Fatigue    HPI: Previously seen at [facility]. Had SSS and pAF. Testing there revealed possible SVC occlusion. Was offered Micra, never followed up on this. Notes fatigue and sleepiness during the day, often heart rate in the 30s. Some dizziness.    ROS (+ highlighted in bold):  Constitutional: Fevers/chills/fatigue/weightloss  HEENT: Blurry vision/eye pain/sore throat/hearing loss  Respiratory: Shortness of breath/cough  Cardiovascular: Chest pain/palpitations/edema/orthopnea/syncope  GI: Nausea/vomitting/diarrhea  MSK: Arthralgias/myagias/muscle weakness  Skin: Rash/sores  Neurological: Numbness/tremors/vertigo  Endocrine: Excessive thirst/polyuria/cold intolerance/heat intolerance  Psych: Depression/anxiety    Past Medical History:   Diagnosis Date   • Allergy    • Cancer (HCC)    • Muscle disorder    • Sinusitis, chronic    • Strep throat        Past Surgical History:   Procedure Laterality Date   • KNEE ARTHROSCOPY         Social History     Socioeconomic History   • Marital status: Single     Spouse name: Not on file   • Number of children: Not on file   • Years of education: Not on file   • Highest education level: Not on file   Occupational History   • Not on file   Tobacco Use   • Smoking status: Never Smoker   • Smokeless tobacco: Never Used   Substance and Sexual Activity   • Alcohol use: No   • Drug use: No   • Sexual activity: Never   Other Topics Concern   • Not on file   Social History Narrative   • Not on file     Social Determinants of Health     Financial Resource Strain:    • Difficulty of Paying Living Expenses:    Food Insecurity:    • Worried About Running Out of Food in the Last Year:    • Ran Out of Food in the Last Year:    Transportation Needs:    • Lack of Transportation (Medical):    • Lack of Transportation (Non-Medical):    Physical Activity:    • Days of  Exercise per Week:    • Minutes of Exercise per Session:    Stress:    • Feeling of Stress :    Social Connections:    • Frequency of Communication with Friends and Family:    • Frequency of Social Gatherings with Friends and Family:    • Attends [employer] Services:    • Active Member of Clubs or Organizations:    • Attends Club or Organization Meetings:    • Marital Status:    Intimate Partner Violence:    • Fear of Current or Ex-Partner:    • Emotionally Abused:    • Physically Abused:    • Sexually Abused:        Family History   Problem Relation Age of Onset   • Cancer Mother    • Heart Disease Father    • Cancer Father    • Stroke Sister        Allergies   Allergen Reactions   • Augmentin Vomiting   • Amoxicillin-Pot Clavulanate Unspecified       Current Outpatient Medications   Medication Sig Dispense Refill   • Degarelix Acetate (FIRMAGON SC) Inject  under the skin Q30 DAYS.     • levothyroxine (SYNTHROID) 88 MCG Tab TAKE 1 TABLET BY MOUTH EVERY DAY IN THE MORNING ON AN EMPTY STOMACH 90 tablet 3   • aspirin 81 MG EC tablet Take 81 mg by mouth every evening.     • Coenzyme Q10 (CO Q 10 PO) Take  by mouth every day.     • Apalutamide 60 MG Tab Erleada 60 mg tablet   Take 4 tablets every day by oral route for 30 days.     • escitalopram (LEXAPRO) 10 MG Tab Take 10 mg by mouth every day.     • albuterol (VENTOLIN OR PROVENTIL) 108 (90 BASE) MCG/ACT AERS Inhale 2 Puffs by mouth every 6 hours as needed for Shortness of Breath. 1 Inhaler 0   • Diclofenac Sodium (VOLTAREN) 1 % GEL Apply 4 g to skin as directed 3 times a day as needed. 1 Tube 0   • Tadalafil (CIALIS) 2.5 MG TABS Take 1 Tab by mouth as needed (30 minutes prior to sexual activity). 30 Tab 1   • fluticasone (FLONASE) 50 MCG/ACT nasal spray Spray 1 Spray in nose every day. Each Nostril (Patient taking differently: Administer 1 Spray into affected nostril(S) as needed. Each Nostril) 1 Bottle 1   • tamsulosin (FLOMAX) 0.4 MG capsule Take 0.4 mg by mouth  "every day.     • traZODone (DESYREL) 150 MG Tab Take 150 mg by mouth. (Patient not taking: Reported on 9/1/2021)       No current facility-administered medications for this visit.       Physical Exam:  Vitals:    09/01/21 1611   BP: 138/84   BP Location: Left arm   Patient Position: Sitting   BP Cuff Size: Adult   Pulse: (!) 37   SpO2: 93%   Weight: 73.5 kg (162 lb)   Height: 1.727 m (5' 8\")     General appearance: NAD, conversant   Eyes: anicteric sclerae, moist conjunctivae; no lid-lag; PERRLA  HENT: Atraumatic; oropharynx clear with moist mucous membranes and no mucosal ulcerations; normal hard and soft palate  Neck: Trachea midline; FROM, supple, no thyromegaly or lymphadenopathy  Lungs: CTA, with normal respiratory effort and no intercostal retractions  CV: Bradycardic, no MRGs, no JVD   Abdomen: Soft, non-tender; no masses or HSM  Extremities: No peripheral edema or extremity lymphadenopathy  Skin: Normal temperature, turgor and texture; no rash, ulcers or subcutaneous nodules  Psych: Appropriate affect, alert and oriented to person, place and time    Data:  Lipids:   Lab Results   Component Value Date/Time    CHOLSTRLTOT 139 03/08/2021 10:29 AM    TRIGLYCERIDE 56 03/08/2021 10:29 AM    HDL 63 03/08/2021 10:29 AM    LDL 65 03/08/2021 10:29 AM        BMP:  Lab Results   Component Value Date/Time    SODIUM 140 03/08/2021 1029    POTASSIUM 4.5 03/08/2021 1029    CHLORIDE 104 03/08/2021 1029    CO2 26 03/08/2021 1029    GLUCOSE 104 (H) 03/08/2021 1029    BUN 21 03/08/2021 1029    CREATININE 0.89 03/08/2021 1029    CALCIUM 9.4 03/08/2021 1029    ANION 10.0 03/08/2021 1029        TSH:   Lab Results   Component Value Date/Time    TSHULTRASEN 5.160 03/22/2021 0956        THYROXINE (T4):   No results found for: BRIAN     CBC:   Lab Results   Component Value Date/Time    WBC 4.9 03/22/2021 09:56 AM    RBC 4.06 (L) 03/22/2021 09:56 AM    HEMOGLOBIN 12.2 (L) 03/22/2021 09:56 AM    HEMATOCRIT 37.5 (L) 03/22/2021 09:56 AM "    MCV 92.4 03/22/2021 09:56 AM    MCH 30.0 03/22/2021 09:56 AM    MCHC 32.5 (L) 03/22/2021 09:56 AM    RDW 44.2 03/22/2021 09:56 AM    PLATELETCT 239 03/22/2021 09:56 AM    MPV 12.2 03/22/2021 09:56 AM    NEUTSPOLYS 55.70 03/22/2021 09:56 AM    LYMPHOCYTES 24.90 03/22/2021 09:56 AM    MONOCYTES 13.10 03/22/2021 09:56 AM    EOSINOPHILS 5.30 03/22/2021 09:56 AM    BASOPHILS 0.80 03/22/2021 09:56 AM    IMMGRAN 0.20 03/22/2021 09:56 AM    NRBC 0.00 03/22/2021 09:56 AM    NEUTS 2.73 03/22/2021 09:56 AM    LYMPHS 1.22 03/22/2021 09:56 AM    MONOS 0.64 03/22/2021 09:56 AM    EOS 0.26 03/22/2021 09:56 AM    BASO 0.04 03/22/2021 09:56 AM    IMMGRANAB 0.01 03/22/2021 09:56 AM    NRBCAB 0.00 03/22/2021 09:56 AM        CBC w/o DIFF  Lab Results   Component Value Date/Time    WBC 4.9 03/22/2021 09:56 AM    RBC 4.06 (L) 03/22/2021 09:56 AM    HEMOGLOBIN 12.2 (L) 03/22/2021 09:56 AM    MCV 92.4 03/22/2021 09:56 AM    MCH 30.0 03/22/2021 09:56 AM    MCHC 32.5 (L) 03/22/2021 09:56 AM    RDW 44.2 03/22/2021 09:56 AM    MPV 12.2 03/22/2021 09:56 AM       EKG interpreted by me: Sinus bradycardia    Impression/Plan:  1. Sinus bradycardia  EKG    CL-PACEMAKER MICRA LEADLESS PACING SYSTEM   2. Bradycardia, sinus, persistent, severe       1. SSS  2. 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 May have been compounded by Mobitz 1 misinterpreted.  - Discussed PPM for SSS. The risk, benefits, and alternatives to pacemaker placement were discussed in great detail, specific risks mentioned including bleeding, infection, cardiac perforation with possible tamponade requiring pericardiocentesis or open heart surgery.  In addition the possibility of lead dislodgment, pneumothorax, hemothorax were discussed. Also mentioned were the possibility of death, stroke, and myocardial infarction. The patient verbalized understanding of these potential complications and wishes to proceed with this procedure.   - Due to possible SVC occlusion,  may have to implant leadless ventricular pacemaker instead of dual chamber. Will prepare for this in the event of SVC occlusion.  - Unclear Afib burden, not on oral OAC, track this through PPM if possible and consider OAC    Plan pacemaker, dual or leadless.      Mohsen Fulton MD  Cardiac Electrophysiology

## 2021-09-03 ENCOUNTER — HOSPITAL ENCOUNTER (OUTPATIENT)
Dept: LAB | Facility: MEDICAL CENTER | Age: 86
End: 2021-09-03
Attending: UROLOGY
Payer: MEDICARE

## 2021-09-03 ENCOUNTER — TELEPHONE (OUTPATIENT)
Dept: CARDIOLOGY | Facility: MEDICAL CENTER | Age: 86
End: 2021-09-03

## 2021-09-03 DIAGNOSIS — R00.1 BRADYCARDIA, SINUS, PERSISTENT, SEVERE: ICD-10-CM

## 2021-09-03 DIAGNOSIS — I49.5 SSS (SICK SINUS SYNDROME) (HCC): ICD-10-CM

## 2021-09-03 LAB — PSA SERPL-MCNC: <0.02 NG/ML (ref 0–4)

## 2021-09-03 PROCEDURE — 84153 ASSAY OF PSA TOTAL: CPT

## 2021-09-03 PROCEDURE — 36415 COLL VENOUS BLD VENIPUNCTURE: CPT

## 2021-09-03 NOTE — TELEPHONE ENCOUNTER
Patient scheduled for PM insert w/possible Micra insert w/anesthesia on 9-24-21 with Dr. Fulton. Patient has been instructed to check in at 12:30 for 2:30 case time. Message sent to carlita Collins with [facility] notified.

## 2021-09-03 NOTE — TELEPHONE ENCOUNTER
----- Message from Mohsen Fulton M.D. sent at 9/1/2021  4:47 PM PDT -----  Please schedule pacemaker, no meds to hold, [employer]. Will try for a conventional pacemaker but there is a history of venous occlusion reportedly, and thus we will prepare for possibility of Micra. Thus can we schedule it for Micra with anesthesia, we the possibility of changing to dual chamber PPM if possible day-of? ThanksMC

## 2021-09-03 NOTE — TELEPHONE ENCOUNTER
Dr. Fulton,    I recvd a call from this patient. He just wanted me to make sure you were aware that he was diagnosised with an aortic aneurysm.     Per the patient this was caught on a scan done at [facility].    Thank You,  Ivelisse

## 2021-09-21 ENCOUNTER — PRE-ADMISSION TESTING (OUTPATIENT)
Dept: ADMISSIONS | Facility: MEDICAL CENTER | Age: 86
End: 2021-09-21
Attending: INTERNAL MEDICINE
Payer: MEDICARE

## 2021-09-21 DIAGNOSIS — Z01.812 PRE-OPERATIVE LABORATORY EXAMINATION: ICD-10-CM

## 2021-09-21 LAB
ALBUMIN SERPL BCP-MCNC: 4.3 G/DL (ref 3.2–4.9)
ALBUMIN/GLOB SERPL: 1.7 G/DL
ALP SERPL-CCNC: 69 U/L (ref 30–99)
ALT SERPL-CCNC: 11 U/L (ref 2–50)
ANION GAP SERPL CALC-SCNC: 9 MMOL/L (ref 7–16)
AST SERPL-CCNC: 20 U/L (ref 12–45)
BASOPHILS # BLD AUTO: 0.5 % (ref 0–1.8)
BASOPHILS # BLD: 0.03 K/UL (ref 0–0.12)
BILIRUB SERPL-MCNC: 0.4 MG/DL (ref 0.1–1.5)
BUN SERPL-MCNC: 22 MG/DL (ref 8–22)
CALCIUM SERPL-MCNC: 9.5 MG/DL (ref 8.5–10.5)
CHLORIDE SERPL-SCNC: 104 MMOL/L (ref 96–112)
CO2 SERPL-SCNC: 25 MMOL/L (ref 20–33)
CREAT SERPL-MCNC: 0.9 MG/DL (ref 0.5–1.4)
EOSINOPHIL # BLD AUTO: 0.15 K/UL (ref 0–0.51)
EOSINOPHIL NFR BLD: 2.6 % (ref 0–6.9)
ERYTHROCYTE [DISTWIDTH] IN BLOOD BY AUTOMATED COUNT: 45.8 FL (ref 35.9–50)
GLOBULIN SER CALC-MCNC: 2.6 G/DL (ref 1.9–3.5)
GLUCOSE SERPL-MCNC: 95 MG/DL (ref 65–99)
HCT VFR BLD AUTO: 38.1 % (ref 42–52)
HGB BLD-MCNC: 12.8 G/DL (ref 14–18)
IMM GRANULOCYTES # BLD AUTO: 0.03 K/UL (ref 0–0.11)
IMM GRANULOCYTES NFR BLD AUTO: 0.5 % (ref 0–0.9)
INR PPP: 1.09 (ref 0.87–1.13)
LYMPHOCYTES # BLD AUTO: 0.83 K/UL (ref 1–4.8)
LYMPHOCYTES NFR BLD: 14.5 % (ref 22–41)
MCH RBC QN AUTO: 31.2 PG (ref 27–33)
MCHC RBC AUTO-ENTMCNC: 33.6 G/DL (ref 33.7–35.3)
MCV RBC AUTO: 92.9 FL (ref 81.4–97.8)
MONOCYTES # BLD AUTO: 0.58 K/UL (ref 0–0.85)
MONOCYTES NFR BLD AUTO: 10.1 % (ref 0–13.4)
NEUTROPHILS # BLD AUTO: 4.12 K/UL (ref 1.82–7.42)
NEUTROPHILS NFR BLD: 71.8 % (ref 44–72)
NRBC # BLD AUTO: 0 K/UL
NRBC BLD-RTO: 0 /100 WBC
PLATELET # BLD AUTO: 238 K/UL (ref 164–446)
PMV BLD AUTO: 12.7 FL (ref 9–12.9)
POTASSIUM SERPL-SCNC: 4.8 MMOL/L (ref 3.6–5.5)
PROT SERPL-MCNC: 6.9 G/DL (ref 6–8.2)
PROTHROMBIN TIME: 13.8 SEC (ref 12–14.6)
RBC # BLD AUTO: 4.1 M/UL (ref 4.7–6.1)
SODIUM SERPL-SCNC: 138 MMOL/L (ref 135–145)
WBC # BLD AUTO: 5.7 K/UL (ref 4.8–10.8)

## 2021-09-21 PROCEDURE — 36415 COLL VENOUS BLD VENIPUNCTURE: CPT

## 2021-09-21 PROCEDURE — C9803 HOPD COVID-19 SPEC COLLECT: HCPCS

## 2021-09-21 PROCEDURE — U0003 INFECTIOUS AGENT DETECTION BY NUCLEIC ACID (DNA OR RNA); SEVERE ACUTE RESPIRATORY SYNDROME CORONAVIRUS 2 (SARS-COV-2) (CORONAVIRUS DISEASE [COVID-19]), AMPLIFIED PROBE TECHNIQUE, MAKING USE OF HIGH THROUGHPUT TECHNOLOGIES AS DESCRIBED BY CMS-2020-01-R: HCPCS

## 2021-09-21 PROCEDURE — 80053 COMPREHEN METABOLIC PANEL: CPT

## 2021-09-21 PROCEDURE — 85025 COMPLETE CBC W/AUTO DIFF WBC: CPT

## 2021-09-21 PROCEDURE — U0005 INFEC AGEN DETEC AMPLI PROBE: HCPCS

## 2021-09-21 PROCEDURE — 85610 PROTHROMBIN TIME: CPT

## 2021-09-21 RX ORDER — FEXOFENADINE HCL 60 MG/1
60 TABLET, FILM COATED ORAL DAILY
COMMUNITY
End: 2022-12-15

## 2021-09-21 ASSESSMENT — FIBROSIS 4 INDEX: FIB4 SCORE: 1.63

## 2021-09-22 LAB
SARS-COV-2 RNA RESP QL NAA+PROBE: NOTDETECTED
SPECIMEN SOURCE: NORMAL

## 2021-09-22 NOTE — TELEPHONE ENCOUNTER
LM on patient's voicemail notifying him we are ok to proceed with the scheduled pacemaker on 9-24-21. Per Dr. Fulton, the aneurysm will not affect the pacemaker. Requested call back with any additional questions.

## 2021-09-23 NOTE — NON-PROVIDER
COVID-19 Pre-surgery screening:  ?  1. Do you have an undiagnosed respiratory illness or symptoms such as coughing or sneezing, SOB, loss of taste or smell? (No)      2. Do you have an unexplained fever greater than 100.4 degrees Fahrenheit or 38 degrees Celsius? (No)     3. Have you had direct exposure to a patient who tested positive for Covid-19? No)  ?  4. Have you traveled within the last 14 days? (No)  ?  Informed of visitor policy and mask use requirement  Yes

## 2021-09-23 NOTE — TELEPHONE ENCOUNTER
Called and spoke with patient - arrival time is now 1:00 for 3:00 case time. Changed time with Dennis with [employer].

## 2021-09-24 ENCOUNTER — HOSPITAL ENCOUNTER (OUTPATIENT)
Facility: MEDICAL CENTER | Age: 86
End: 2021-09-25
Attending: INTERNAL MEDICINE | Admitting: INTERNAL MEDICINE
Payer: MEDICARE

## 2021-09-24 ENCOUNTER — APPOINTMENT (OUTPATIENT)
Dept: CARDIOLOGY | Facility: MEDICAL CENTER | Age: 86
End: 2021-09-24
Attending: INTERNAL MEDICINE
Payer: MEDICARE

## 2021-09-24 ENCOUNTER — ANESTHESIA (OUTPATIENT)
Dept: SURGERY | Facility: MEDICAL CENTER | Age: 86
End: 2021-09-24
Payer: MEDICARE

## 2021-09-24 ENCOUNTER — APPOINTMENT (OUTPATIENT)
Dept: RADIOLOGY | Facility: MEDICAL CENTER | Age: 86
End: 2021-09-24
Attending: INTERNAL MEDICINE
Payer: MEDICARE

## 2021-09-24 ENCOUNTER — ANESTHESIA EVENT (OUTPATIENT)
Dept: SURGERY | Facility: MEDICAL CENTER | Age: 86
End: 2021-09-24
Payer: MEDICARE

## 2021-09-24 DIAGNOSIS — R00.1 SINUS BRADYCARDIA: ICD-10-CM

## 2021-09-24 DIAGNOSIS — I49.5 SSS (SICK SINUS SYNDROME) (HCC): ICD-10-CM

## 2021-09-24 DIAGNOSIS — R00.1 BRADYCARDIA, SINUS, PERSISTENT, SEVERE: ICD-10-CM

## 2021-09-24 LAB — EKG IMPRESSION: NORMAL

## 2021-09-24 PROCEDURE — C1894 INTRO/SHEATH, NON-LASER: HCPCS

## 2021-09-24 PROCEDURE — 700105 HCHG RX REV CODE 258: Performed by: EMERGENCY MEDICINE

## 2021-09-24 PROCEDURE — A9270 NON-COVERED ITEM OR SERVICE: HCPCS | Performed by: INTERNAL MEDICINE

## 2021-09-24 PROCEDURE — 700101 HCHG RX REV CODE 250: Performed by: EMERGENCY MEDICINE

## 2021-09-24 PROCEDURE — 33208 INSRT HEART PM ATRIAL & VENT: CPT | Mod: KX | Performed by: INTERNAL MEDICINE

## 2021-09-24 PROCEDURE — 93010 ELECTROCARDIOGRAM REPORT: CPT | Mod: 59 | Performed by: INTERNAL MEDICINE

## 2021-09-24 PROCEDURE — 700111 HCHG RX REV CODE 636 W/ 250 OVERRIDE (IP): Performed by: INTERNAL MEDICINE

## 2021-09-24 PROCEDURE — 700111 HCHG RX REV CODE 636 W/ 250 OVERRIDE (IP)

## 2021-09-24 PROCEDURE — G0378 HOSPITAL OBSERVATION PER HR: HCPCS

## 2021-09-24 PROCEDURE — 700101 HCHG RX REV CODE 250

## 2021-09-24 PROCEDURE — 160002 HCHG RECOVERY MINUTES (STAT)

## 2021-09-24 PROCEDURE — 93005 ELECTROCARDIOGRAM TRACING: CPT | Performed by: INTERNAL MEDICINE

## 2021-09-24 PROCEDURE — 96365 THER/PROPH/DIAG IV INF INIT: CPT | Mod: XU

## 2021-09-24 PROCEDURE — 4410588 CL-PACEMAKER MICRA LEADLESS PACING SYSTEM

## 2021-09-24 PROCEDURE — 700111 HCHG RX REV CODE 636 W/ 250 OVERRIDE (IP): Performed by: EMERGENCY MEDICINE

## 2021-09-24 PROCEDURE — 71045 X-RAY EXAM CHEST 1 VIEW: CPT

## 2021-09-24 PROCEDURE — 700102 HCHG RX REV CODE 250 W/ 637 OVERRIDE(OP): Performed by: INTERNAL MEDICINE

## 2021-09-24 RX ORDER — CEFAZOLIN SODIUM 2 G/100ML
2 INJECTION, SOLUTION INTRAVENOUS EVERY 8 HOURS
Status: COMPLETED | OUTPATIENT
Start: 2021-09-24 | End: 2021-09-25

## 2021-09-24 RX ORDER — DIPHENHYDRAMINE HYDROCHLORIDE 50 MG/ML
12.5 INJECTION INTRAMUSCULAR; INTRAVENOUS
Status: DISCONTINUED | OUTPATIENT
Start: 2021-09-24 | End: 2021-09-24 | Stop reason: HOSPADM

## 2021-09-24 RX ORDER — MEPERIDINE HYDROCHLORIDE 25 MG/ML
6.25 INJECTION INTRAMUSCULAR; INTRAVENOUS; SUBCUTANEOUS
Status: DISCONTINUED | OUTPATIENT
Start: 2021-09-24 | End: 2021-09-24 | Stop reason: HOSPADM

## 2021-09-24 RX ORDER — HYDRALAZINE HYDROCHLORIDE 20 MG/ML
5 INJECTION INTRAMUSCULAR; INTRAVENOUS
Status: DISCONTINUED | OUTPATIENT
Start: 2021-09-24 | End: 2021-09-24 | Stop reason: HOSPADM

## 2021-09-24 RX ORDER — CEFAZOLIN SODIUM 1 G/3ML
INJECTION, POWDER, FOR SOLUTION INTRAMUSCULAR; INTRAVENOUS
Status: COMPLETED
Start: 2021-09-24 | End: 2021-09-24

## 2021-09-24 RX ORDER — SODIUM CHLORIDE, SODIUM LACTATE, POTASSIUM CHLORIDE, CALCIUM CHLORIDE 600; 310; 30; 20 MG/100ML; MG/100ML; MG/100ML; MG/100ML
INJECTION, SOLUTION INTRAVENOUS
Status: DISCONTINUED | OUTPATIENT
Start: 2021-09-24 | End: 2021-09-24 | Stop reason: SURG

## 2021-09-24 RX ORDER — TAMSULOSIN HYDROCHLORIDE 0.4 MG/1
0.4 CAPSULE ORAL NIGHTLY
Status: DISCONTINUED | OUTPATIENT
Start: 2021-09-25 | End: 2021-09-25 | Stop reason: HOSPADM

## 2021-09-24 RX ORDER — HALOPERIDOL 5 MG/ML
1 INJECTION INTRAMUSCULAR
Status: DISCONTINUED | OUTPATIENT
Start: 2021-09-24 | End: 2021-09-24 | Stop reason: HOSPADM

## 2021-09-24 RX ORDER — LIDOCAINE HYDROCHLORIDE 20 MG/ML
INJECTION, SOLUTION INFILTRATION; PERINEURAL
Status: COMPLETED
Start: 2021-09-24 | End: 2021-09-24

## 2021-09-24 RX ORDER — BUPIVACAINE HYDROCHLORIDE 2.5 MG/ML
INJECTION, SOLUTION EPIDURAL; INFILTRATION; INTRACAUDAL
Status: COMPLETED
Start: 2021-09-24 | End: 2021-09-24

## 2021-09-24 RX ORDER — ONDANSETRON 2 MG/ML
4 INJECTION INTRAMUSCULAR; INTRAVENOUS
Status: DISCONTINUED | OUTPATIENT
Start: 2021-09-24 | End: 2021-09-24 | Stop reason: HOSPADM

## 2021-09-24 RX ORDER — LABETALOL HYDROCHLORIDE 5 MG/ML
5 INJECTION, SOLUTION INTRAVENOUS
Status: DISCONTINUED | OUTPATIENT
Start: 2021-09-24 | End: 2021-09-24 | Stop reason: HOSPADM

## 2021-09-24 RX ORDER — OXYCODONE HYDROCHLORIDE AND ACETAMINOPHEN 5; 325 MG/1; MG/1
1 TABLET ORAL
Status: DISCONTINUED | OUTPATIENT
Start: 2021-09-24 | End: 2021-09-24 | Stop reason: HOSPADM

## 2021-09-24 RX ORDER — SODIUM CHLORIDE, SODIUM LACTATE, POTASSIUM CHLORIDE, AND CALCIUM CHLORIDE .6; .31; .03; .02 G/100ML; G/100ML; G/100ML; G/100ML
500 INJECTION, SOLUTION INTRAVENOUS ONCE
Status: DISCONTINUED | OUTPATIENT
Start: 2021-09-24 | End: 2021-09-24 | Stop reason: HOSPADM

## 2021-09-24 RX ORDER — HYDROMORPHONE HYDROCHLORIDE 1 MG/ML
0.4 INJECTION, SOLUTION INTRAMUSCULAR; INTRAVENOUS; SUBCUTANEOUS
Status: DISCONTINUED | OUTPATIENT
Start: 2021-09-24 | End: 2021-09-24 | Stop reason: HOSPADM

## 2021-09-24 RX ORDER — HYDROMORPHONE HYDROCHLORIDE 1 MG/ML
0.2 INJECTION, SOLUTION INTRAMUSCULAR; INTRAVENOUS; SUBCUTANEOUS
Status: DISCONTINUED | OUTPATIENT
Start: 2021-09-24 | End: 2021-09-24 | Stop reason: HOSPADM

## 2021-09-24 RX ORDER — HYDROMORPHONE HYDROCHLORIDE 1 MG/ML
0.1 INJECTION, SOLUTION INTRAMUSCULAR; INTRAVENOUS; SUBCUTANEOUS
Status: DISCONTINUED | OUTPATIENT
Start: 2021-09-24 | End: 2021-09-24 | Stop reason: HOSPADM

## 2021-09-24 RX ORDER — LEVOTHYROXINE SODIUM 88 UG/1
88 TABLET ORAL
Status: DISCONTINUED | OUTPATIENT
Start: 2021-09-25 | End: 2021-09-25 | Stop reason: HOSPADM

## 2021-09-24 RX ORDER — ESCITALOPRAM OXALATE 10 MG/1
10 TABLET ORAL
Status: DISCONTINUED | OUTPATIENT
Start: 2021-09-25 | End: 2021-09-25 | Stop reason: HOSPADM

## 2021-09-24 RX ORDER — CEFAZOLIN SODIUM 1 G/3ML
INJECTION, POWDER, FOR SOLUTION INTRAMUSCULAR; INTRAVENOUS PRN
Status: DISCONTINUED | OUTPATIENT
Start: 2021-09-24 | End: 2021-09-24 | Stop reason: SURG

## 2021-09-24 RX ORDER — OXYCODONE HYDROCHLORIDE AND ACETAMINOPHEN 5; 325 MG/1; MG/1
2 TABLET ORAL
Status: DISCONTINUED | OUTPATIENT
Start: 2021-09-24 | End: 2021-09-24 | Stop reason: HOSPADM

## 2021-09-24 RX ADMIN — CEFAZOLIN 1000 MG: 330 INJECTION, POWDER, FOR SOLUTION INTRAMUSCULAR; INTRAVENOUS at 16:23

## 2021-09-24 RX ADMIN — CEFAZOLIN SODIUM 2 G: 2 INJECTION, SOLUTION INTRAVENOUS at 22:30

## 2021-09-24 RX ADMIN — TRAZODONE HYDROCHLORIDE 150 MG: 100 TABLET ORAL at 21:22

## 2021-09-24 RX ADMIN — SODIUM CHLORIDE, POTASSIUM CHLORIDE, SODIUM LACTATE AND CALCIUM CHLORIDE: 600; 310; 30; 20 INJECTION, SOLUTION INTRAVENOUS at 16:01

## 2021-09-24 RX ADMIN — SODIUM CHLORIDE, POTASSIUM CHLORIDE, SODIUM LACTATE AND CALCIUM CHLORIDE: 600; 310; 30; 20 INJECTION, SOLUTION INTRAVENOUS at 15:26

## 2021-09-24 RX ADMIN — BUPIVACAINE HYDROCHLORIDE: 2.5 INJECTION, SOLUTION EPIDURAL; INFILTRATION; INTRACAUDAL; PERINEURAL at 16:23

## 2021-09-24 RX ADMIN — PROPOFOL 50 MCG/KG/MIN: 10 INJECTION, EMULSION INTRAVENOUS at 16:10

## 2021-09-24 RX ADMIN — LIDOCAINE HYDROCHLORIDE: 20 INJECTION, SOLUTION INFILTRATION; PERINEURAL at 16:23

## 2021-09-24 RX ADMIN — CEFAZOLIN 2 G: 330 INJECTION, POWDER, FOR SOLUTION INTRAMUSCULAR; INTRAVENOUS at 16:05

## 2021-09-24 RX ADMIN — LABETALOL HYDROCHLORIDE 5 MG: 5 INJECTION, SOLUTION INTRAVENOUS at 17:48

## 2021-09-24 ASSESSMENT — FIBROSIS 4 INDEX
FIB4 SCORE: 2.15

## 2021-09-24 ASSESSMENT — PAIN SCALES - GENERAL: PAIN_LEVEL: 0

## 2021-09-24 NOTE — OR NURSING
Assume care for pt in pre-op. Patient allergies and NPO status verified. Belongings at bedside, will lock-up belongings prior sx. Patient verbalizes understanding of pain scale, expected course of stay and plan of care. Surgical site verified with patient. IV access established. EKG results in chart. Call light within reach. No further needs at this time. Hourly rounding in place.

## 2021-09-24 NOTE — OP REPORT
PROCEDURE PERFORMED: Permanent Pacemaker Implantation    DATE OF SERVICE: 9/24/2021    : Mohsen Fulton MD    ASSISTANT: None    ANESTHESIA:   MAC    MEDICATIONS:  2g Ancef    EBL: 20 cc    SPECIMENS: None    STATEMENT OF MEDICAL NECESSITY:  Sinus node dysfunction    DESCRIPTION OF PROCEDURE:  After informed written consent, the patient was brought to the electrophysiology lab in the fasting, unsedated state. The patient was prepped and draped in the usual sterile fashion. The procedure was performed under moderate sedation with local anesthetic. A left upper extremity venogram was performed, demonstrating an occluded subclavian vein. A right upper extremity venogram demonstrated a patent venous system. A right infraclavicular incision was made with a scalpel and the pectoral device pocket was created using a combination of blunt dissection and electrocautery. The modified Seldinger technique was used to gain access to the right axillary vein. Two peel-away hemostasis sheaths were placed in the vein. Under fluoroscopic guidance, the pacemaker leads were introduced into the heart. The ventricular lead was advanced to the RVOT and then lowered into position at the RV septum. The atrial lead was positioned on R atrial appendage. The leads were tested and had satisfactory sensing and pacing parameters. High output ventricular pacing did not produce extracardiac stimulation. The leads were sutured to the underlying pectoral muscle with interrupted silk over a silastic suture sleeve. The device pocket was irrigated with antibiotic solution, inspected, and no bleeding was seen. The leads were connected to the pacemaker pulse generator and the device was inserted into the pocket. The wound was closed with three layers of absorbable sutures. Following recovery from sedation, the patient was transferred to a monitored bed in good condition.     IMPLANTED DEVICE INFORMATION:  Pulse generator is a [employer] model  W3DR01  Serial # [id number]    LEAD INFORMATION:  1)Right atrial lead is a Medtronic model #5076 , serial #[id number] ,P wave 1.0 millivolts, threshold 1.0 Volts at 0.5 milliseconds, pacing impedance 513 Ohms.    2)Right ventricular lead is a Medtronic  model #5076  , serial #[id number] ,R wave 6.6 millivolts, threshold 0.5 Volts at 0.5 milliseconds, pacing impedance 589 Ohms.    DEVICE PROGRAMMING:  DDDR 60 -120 ppm    FLUOROSCOPY TIME: 3.3 min    IMPRESSIONS:  1. Successful dual chamber pacemaker implantation    RECOMMENDATIONS:  1. Transfer to monitored bed  2. PA and lateral chest x-ray  3. Device interrogation prior to hospital discharge  4. IV antibiotics for 2 doses  5. Followup in device clinic

## 2021-09-24 NOTE — ANESTHESIA PREPROCEDURE EVALUATION
Relevant Problems   CARDIAC   (positive) RBBB   (positive) Sinus bradycardia      GI   (positive) GERD (gastroesophageal reflux disease)      ENDO   (positive) Hypothyroidism       Physical Exam    Airway   Mallampati: II  TM distance: >3 FB  Neck ROM: full       Cardiovascular - normal exam  Rhythm: regular  Rate: normal  (-) murmur     Dental - normal exam           Pulmonary - normal exam  Breath sounds clear to auscultation     Abdominal    Neurological - normal exam                 Anesthesia Plan    ASA 3       Plan - general       Airway plan will be natural airway          Induction: intravenous    Postoperative Plan: Postoperative administration of opioids is intended.    Pertinent diagnostic labs and testing reviewed    Informed Consent:    Anesthetic plan and risks discussed with patient.    Use of blood products discussed with: patient whom consented to blood products.

## 2021-09-25 ENCOUNTER — APPOINTMENT (OUTPATIENT)
Dept: RADIOLOGY | Facility: MEDICAL CENTER | Age: 86
End: 2021-09-25
Attending: INTERNAL MEDICINE
Payer: MEDICARE

## 2021-09-25 VITALS
RESPIRATION RATE: 16 BRPM | SYSTOLIC BLOOD PRESSURE: 159 MMHG | TEMPERATURE: 97.7 F | BODY MASS INDEX: 23.36 KG/M2 | OXYGEN SATURATION: 95 % | WEIGHT: 154.1 LBS | HEART RATE: 60 BPM | HEIGHT: 68 IN | DIASTOLIC BLOOD PRESSURE: 72 MMHG

## 2021-09-25 PROBLEM — Z95.0 S/P PLACEMENT OF CARDIAC PACEMAKER: Status: ACTIVE | Noted: 2021-09-25

## 2021-09-25 LAB
EKG IMPRESSION: NORMAL
EKG IMPRESSION: NORMAL

## 2021-09-25 PROCEDURE — 93010 ELECTROCARDIOGRAM REPORT: CPT | Performed by: INTERNAL MEDICINE

## 2021-09-25 PROCEDURE — A9270 NON-COVERED ITEM OR SERVICE: HCPCS | Performed by: INTERNAL MEDICINE

## 2021-09-25 PROCEDURE — 93010 ELECTROCARDIOGRAM REPORT: CPT | Mod: 76 | Performed by: INTERNAL MEDICINE

## 2021-09-25 PROCEDURE — 93005 ELECTROCARDIOGRAM TRACING: CPT | Performed by: INTERNAL MEDICINE

## 2021-09-25 PROCEDURE — 700111 HCHG RX REV CODE 636 W/ 250 OVERRIDE (IP): Performed by: INTERNAL MEDICINE

## 2021-09-25 PROCEDURE — 71045 X-RAY EXAM CHEST 1 VIEW: CPT

## 2021-09-25 PROCEDURE — 99024 POSTOP FOLLOW-UP VISIT: CPT | Performed by: NURSE PRACTITIONER

## 2021-09-25 PROCEDURE — G0378 HOSPITAL OBSERVATION PER HR: HCPCS

## 2021-09-25 PROCEDURE — 700102 HCHG RX REV CODE 250 W/ 637 OVERRIDE(OP): Performed by: INTERNAL MEDICINE

## 2021-09-25 RX ADMIN — ESCITALOPRAM OXALATE 10 MG: 10 TABLET ORAL at 05:24

## 2021-09-25 RX ADMIN — LEVOTHYROXINE SODIUM 88 MCG: 0.09 TABLET ORAL at 05:24

## 2021-09-25 RX ADMIN — CEFAZOLIN SODIUM 2 G: 2 INJECTION, SOLUTION INTRAVENOUS at 05:24

## 2021-09-25 ASSESSMENT — ENCOUNTER SYMPTOMS
COLOR CHANGE: 0
NUMBNESS: 0
DIZZINESS: 0
FEVER: 0
CHEST TIGHTNESS: 0
BLOOD IN STOOL: 0
ABDOMINAL DISTENTION: 0
AGITATION: 0
CONFUSION: 0
NERVOUS/ANXIOUS: 0
DIAPHORESIS: 0
ABDOMINAL PAIN: 0
SHORTNESS OF BREATH: 0
PALPITATIONS: 0
COUGH: 0
CHILLS: 0
TROUBLE SWALLOWING: 0

## 2021-09-25 NOTE — PROGRESS NOTES
Assumed patient care. Patient A&O x 4 on RA. Patient has tele box in place. Patient updated on plan of care, verbalizes understanding. Patient has fall precautions in place, call light within reach. Patient has bed in low and locked position. Will continue to monitor.       COVID-19 surge in effect.

## 2021-09-25 NOTE — ANESTHESIA TIME REPORT
Anesthesia Start and Stop Event Times     Date Time Event    9/24/2021 1520 Ready for Procedure     1526 Anesthesia Start     1702 Anesthesia Stop        Responsible Staff  09/24/21    Name Role Begin End    Leoncio Manzo M.D. Anesth 1526 1702        Preop Diagnosis (Free Text):  Pre-op Diagnosis             Preop Diagnosis (Codes):    Post op Diagnosis  Sick sinus syndrome      Premium Reason  A. 3PM - 7AM    Comments:

## 2021-09-25 NOTE — PROGRESS NOTES
Discharge instructions given to patient at bedside, verbalizes understanding and states plans for follow-up for pacemaker check as recommended. Individualized instruction and  discharge information provided on pacemaker site care, post discharge activity level, and  new and home medication review, and worsening of symptoms needing follow-up care. Telemetry monitor/IV cathlon removed. All belongings accounted for, all questions answered at this time. Patient discharged to home  with  son.

## 2021-09-25 NOTE — ANESTHESIA POSTPROCEDURE EVALUATION
Patient: David Darling    Procedure Summary     Date: 09/24/21 Room / Location: [facility] LAB 03 / SURGERY [facility]    Anesthesia Start: 1526 Anesthesia Stop: 1702    Procedure: CL-PERMANENT PACEMAKER INSERTION, POSSIBLE PACEMAKER [place] LEADLESS PACING SYSTEM WITH ANESTHESIA, MEDTRONIC-DR. BARTON Diagnosis:     Surgeons: Cath-Recovery Surgery Responsible Provider: Leoncio Manzo M.D.    Anesthesia Type: general ASA Status: 3          Final Anesthesia Type: general  Last vitals  BP   Blood Pressure : 139/65    Temp   36.4 °C (97.5 °F)    Pulse   (!) 45   Resp   16    SpO2   97 %      Anesthesia Post Evaluation    Patient location during evaluation: PACU  Patient participation: complete - patient participated  Level of consciousness: awake and alert  Pain score: 0    Airway patency: patent  Anesthetic complications: no  Cardiovascular status: hemodynamically stable  Respiratory status: acceptable  Hydration status: euvolemic    PONV: none          No complications documented.     Nurse Pain Score: 1 (NPRS)

## 2021-09-25 NOTE — PROGRESS NOTES
Cardiology Follow Up Progress Note    Date of Service  9/25/2021    Attending Physician  Mohsen Fulton M.D.    Chief Complaint   Elective pacemaker     HPI  David Darling is a 86 y.o. male admitted 9/24/2021 with past medical history of SSS and PAF.     Patient underwent successful dual chamber pacemaker implantation 9/24/2021 with Dr. Fulton.     Interim Events  Patient resting in bed, device site is uncomplicated.   Small dry sanguineous drainge noted on the dressing.   Paced overnight     Review of Systems  Review of Systems   Constitutional: Negative for chills, diaphoresis and fever.   HENT: Negative for nosebleeds and trouble swallowing.    Respiratory: Negative for cough, chest tightness and shortness of breath.    Cardiovascular: Negative for chest pain, palpitations and leg swelling.   Gastrointestinal: Negative for abdominal distention, abdominal pain and blood in stool.   Genitourinary: Negative for hematuria.   Skin: Negative for color change.   Neurological: Negative for dizziness, syncope and numbness.   Psychiatric/Behavioral: Negative for agitation and confusion. The patient is not nervous/anxious.        Vital signs in last 24 hours  Temp:  [36.2 °C (97.1 °F)-36.8 °C (98.3 °F)] 36.5 °C (97.7 °F)  Pulse:  [45-69] 60  Resp:  [12-20] 16  BP: (104-177)/(62-81) 159/72  SpO2:  [91 %-97 %] 95 %    Physical Exam  Physical Exam  Vitals and nursing note reviewed.   Constitutional:       Appearance: Normal appearance.   HENT:      Head: Normocephalic and atraumatic.   Eyes:      Pupils: Pupils are equal, round, and reactive to light.   Cardiovascular:      Rate and Rhythm: Normal rate and regular rhythm.      Heart sounds: Normal heart sounds. No murmur heard.        Comments: Pacemaker noted in the right upper chest   Pulmonary:      Effort: Pulmonary effort is normal.      Breath sounds: Normal breath sounds.   Abdominal:      General: Abdomen is flat.   Musculoskeletal:      Cervical back: Normal range  of motion.   Skin:     General: Skin is warm and dry.   Neurological:      General: No focal deficit present.      Mental Status: He is alert and oriented to person, place, and time.   Psychiatric:         Mood and Affect: Mood normal.         Behavior: Behavior normal.         Thought Content: Thought content normal.         Judgment: Judgment normal.         Lab Review  Lab Results   Component Value Date/Time    WBC 5.7 09/21/2021 01:27 PM    RBC 4.10 (L) 09/21/2021 01:27 PM    HEMOGLOBIN 12.8 (L) 09/21/2021 01:27 PM    HEMATOCRIT 38.1 (L) 09/21/2021 01:27 PM    MCV 92.9 09/21/2021 01:27 PM    MCH 31.2 09/21/2021 01:27 PM    MCHC 33.6 (L) 09/21/2021 01:27 PM    MPV 12.7 09/21/2021 01:27 PM      Lab Results   Component Value Date/Time    SODIUM 138 09/21/2021 01:27 PM    POTASSIUM 4.8 09/21/2021 01:27 PM    CHLORIDE 104 09/21/2021 01:27 PM    CO2 25 09/21/2021 01:27 PM    GLUCOSE 95 09/21/2021 01:27 PM    BUN 22 09/21/2021 01:27 PM    CREATININE 0.90 09/21/2021 01:27 PM      Lab Results   Component Value Date/Time    ASTSGOT 20 09/21/2021 01:27 PM    ALTSGPT 11 09/21/2021 01:27 PM     Lab Results   Component Value Date/Time    CHOLSTRLTOT 139 03/08/2021 10:29 AM    LDL 65 03/08/2021 10:29 AM    HDL 63 03/08/2021 10:29 AM    TRIGLYCERIDE 56 03/08/2021 10:29 AM       No results for input(s): NTPROBNP in the last 72 hours.    Cardiac Imaging and Procedures Review      IMPLANTED DEVICE INFORMATION:  Pulse generator is a Medtronic model W3DR01  Serial # [id number]     LEAD INFORMATION:  1)Right atrial lead is a Medtronic model #5076 , serial #[id number] ,P wave 1.0 millivolts, threshold 1.0 Volts at 0.5 milliseconds, pacing impedance 513 Ohms.     2)Right ventricular lead is a Medtronic  model #5076  , serial #[id number] ,R wave 6.6 millivolts, threshold 0.5 Volts at 0.5 milliseconds, pacing impedance 589 Ohms.     DEVICE PROGRAMMING:  DDDR 60 -120 ppm    Assessment/Plan  No new Assessment & Plan notes have been filed  under this hospital service since the last note was generated.  Service: Cardiology      1. S/p pacemaker implantation:  - dual chamber meditronic PPM with normal function.  - Device site is uncomplicated.    - CXR without PTX.  -   Follow up is arranged in device clinic. Please call with any questions.     Pacer restrictions reviewed with patient. Do not raise affected arm above head or behind back for six weeks. May remove arm sling after 24 hrs, please wear if trouble remembering arm limitation and prn at night. No heavy lifting/pushing with L arm for six weeks. No driving for first week. No showers first week. Keep dressing on, clean, and dry until seen follow up. Wound care reviewed. Instructed to report s/s of infection such as warmth/redness/drainage/swelling at site or fever/chills.  Patient verbalizes understanding.    Future Appointments   Date Time Provider Department Center   9/30/2021  3:15 PM PACER CHECK-[facility] None         Please contact me with any questions.    SOPHIA Nguyen.

## 2021-09-25 NOTE — PROGRESS NOTES
Patient transported to floor from PACU. Patient A&Ox4. Plan of care discussed with patient. Patient oriented to floor and surroundings. Patient currently on room air. VSS. Patient is not expressing any complaints of pain at this time. Tele box placed. Monitor room notified. 2 rn skin check performed. Patient educated to call for assistance before ambulating. Patient education regarding fall risk. Call light within reach. Fall precautions in place.    New pacer maker site CDI. JANUARY in ing

## 2021-09-25 NOTE — DISCHARGE INSTRUCTIONS
Pacemaker Implantation, Adult, Care After  This sheet gives you information about how to care for yourself after your procedure. Your health care provider may also give you more specific instructions. If you have problems or questions, contact your health care provider.  What can I expect after the procedure?  After the procedure, it is common to have:  · Mild pain.  · Slight bruising.  · Some swelling over the incision.  · A slight bump over the skin where the device was placed. Sometimes, it is possible to feel the device under the skin. This is normal.  Follow these instructions at home:  Medicines  · Take over-the-counter and prescription medicines only as told by your health care provider.  · If you were prescribed an antibiotic medicine, take it as told by your health care provider. Do not stop taking the antibiotic even if you start to feel better.  Wound care    · Do not remove the bandage on your chest until directed to do so by your health care provider.  · After your bandage is removed, you may see pieces of tape called skin adhesive strips over the area where the cut was made (incision site). Let them fall off on their own.  · Check the incision site every day to make sure it is not infected, bleeding, or starting to pull apart.  · Do not use lotions or ointments near the incision site unless directed to do so.  · Keep the incision area clean and dry for 2-3 days after the procedure or as directed by your health care provider. It takes several weeks for the incision site to completely heal.  · Do not take baths, swim, or use a hot tub for 7-10 days or as otherwise directed by your health care provider.  Activity  · Do not drive or use heavy machinery while taking prescription pain medicine.  · Do not drive for 24 hours if you were given a medicine to help you relax (sedative).  · Check with your health care provider before you start to drive or play sports.  · Avoid sudden jerking, pulling, or chopping  movements that pull your upper arm far away from your body. Avoid these movements for at least 6 weeks or as long as told by your health care provider.  · Do not lift your upper arm above your shoulders for at least 6 weeks or as long as told by your health care provider. This means no tennis, golf, or swimming.  · You may go back to work when your health care provider says it is okay.  Pacemaker care  · You may be shown how to transfer data from your pacemaker through the phone to your health care provider.  · Always let all health care providers know about your pacemaker before you have any medical procedures or tests.  · Wear a medical ID bracelet or necklace stating that you have a pacemaker. Carry a pacemaker ID card with you at all times.  · Your pacemaker battery will last for 5-15 years. Routine checks by your health care provider will let the health care provider know when the battery is starting to run down. The pacemaker will need to be replaced when the battery starts to run down.  · Do not use amateur radio equipment or electric welding torches. Other electrical devices are safe to use, including power tools, lawn mowers, and speakers. If you are unsure of whether something is safe to use, ask your health care provider.  · When using your cell phone, hold it to the ear opposite the pacemaker. Do not leave your cell phone in a pocket over the pacemaker.  · Avoid places or objects that have a strong electric or magnetic field, including:  ? Airport security clark. When at the airport, let officials know that you have a pacemaker.  ? Power plants.  ? Large electrical generators.  ? Radiofrequency transmission towers, such as cell phone and radio towers.  General instructions  · Weigh yourself every day. If you suddenly gain weight, fluid may be building up in your body.  · Keep all follow-up visits as told by your health care provider. This is important.  Contact a health care provider if:  · You gain  weight suddenly.  · Your legs or feet swell.  · It feels like your heart is fluttering or skipping beats (heart palpitations).  · You have chills or a fever.  · You have more redness, swelling, or pain around your incisions.  · You have more fluid or blood coming from your incisions.  · Your incisions feel warm to the touch.  · You have pus or a bad smell coming from your incisions.  Get help right away if:  · You have chest pain.  · You have trouble breathing or are short of breath.  · You become extremely tired.  · You are light-headed or you faint.  This information is not intended to replace advice given to you by your health care provider. Make sure you discuss any questions you have with your health care provider.  Document Released: 07/07/2006 Document Revised: 11/30/2018 Document Reviewed: 09/29/2017  [employer] Patient Education © 2020 [employer]      Discharge Instructions    Discharged to home by car with relative. Discharged via wheelchair, hospital escort: Yes.  Special equipment needed: Not Applicable    Be sure to schedule a follow-up appointment with your primary care doctor or any specialists as instructed.     Discharge Plan:   Diet Plan: Discussed  Activity Level: Discussed  Confirmed Follow up Appointment: Appointment Scheduled  Confirmed Symptoms Management: Discussed  Medication Reconciliation Updated: Yes    I understand that a diet low in cholesterol, fat, and sodium is recommended for good health. Unless I have been given specific instructions below for another diet, I accept this instruction as my diet prescription.   Other diet: cardiac    Special Instructions: None    · Is patient discharged on Warfarin / Coumadin?   No     Depression / Suicide Risk    As you are discharged from this [facility] facility, it is important to learn how to keep safe from harming yourself.    Recognize the warning signs:  · Abrupt changes in personality, positive or negative- including increase in energy    · Giving away possessions  · Change in eating patterns- significant weight changes-  positive or negative  · Change in sleeping patterns- unable to sleep or sleeping all the time   · Unwillingness or inability to communicate  · Depression  · Unusual sadness, discouragement and loneliness  · Talk of wanting to die  · Neglect of personal appearance   · Rebelliousness- reckless behavior  · Withdrawal from people/activities they love  · Confusion- inability to concentrate     If you or a loved one observes any of these behaviors or has concerns about self-harm, here's what you can do:  · Talk about it- your feelings and reasons for harming yourself  · Remove any means that you might use to hurt yourself (examples: pills, rope, extension cords, firearm)  · Get professional help from the community (Mental Health, Substance Abuse, psychological counseling)  · Do not be alone:Call your Safe Contact- someone whom you trust who will be there for you.  · Call your local CRISIS HOTLINE 573-4144 or 817-104-8886  · Call your local Children's Mobile Crisis Response Team [place] (060) 010-4372 or www.GradeStack  · Call the toll free National Suicide Prevention Hotlines   · National Suicide Prevention Lifeline 526-908-SNKT (2158)  · National Hope Line Network 800-SUICIDE (760-1299)

## 2021-09-25 NOTE — PROGRESS NOTES
Received bedside report from RN, pt care assumed, VSS, pt assessment complete. Pt AAOx4, with no pain at this time. No signs of acute distress noted at this time. Plan of care discussed with pt and verbalizes no questions. Pt denies any additional needs at this time. Bed locked/in lowest position, pt educated on fall risk and verbalized understanding, call light within reach, hourly rounding initiated.     covid 19 surge crisis charting initiated

## 2021-09-27 ENCOUNTER — TELEPHONE (OUTPATIENT)
Dept: MEDICAL GROUP | Facility: PHYSICIAN GROUP | Age: 86
End: 2021-09-27

## 2021-09-27 NOTE — TELEPHONE ENCOUNTER
NEW PATIENT VISIT PRE-VISIT PLANNING    1.  EpicCare Patient is checked in Patient Demographics?Yes    2.  Immunizations were updated in Epic using Reconcile Outside Information activity? Yes         3.  Is this appointment scheduled as a Hospital Follow-Up? No    4.  Patient is due for the following Health Maintenance Topics:   Health Maintenance Due   Topic Date Due   • Annual Wellness Visit  Never done   • IMM ZOSTER VACCINES (1 of 2) Never done   • IMM PNEUMOCOCCAL VACCINE: 65+ Years (1 of 1 - PPSV23) Never done   • COLORECTAL CANCER SCREENING  08/22/2014   • IMM INFLUENZA (1) 09/01/2021       5.  Reviewed/Updated the following with patient:       •   Preferred Pharmacy? Yes       •   Preferred Lab? Yes       •   Preferred Communication? Yes       •   Allergies? Yes       •   Medications? YES. Was Abstract Encounter opened and chart updated? YES       •   Social History? No       •   Family History (document living status of immediate family members and if + hx of  cancer, diabetes, hypertension, hyperlipidemia, heart attack, stroke) No    6.  Updated Care Team?       •   DME Company (gait device, O2, CPAP, etc.) NO       •   Other Specialists (eye doctor, derm, GYN, cardiology, endo, etc): NO    7.  AHA (Puls8) form printed for Provider? No, already completed

## 2021-09-28 ENCOUNTER — APPOINTMENT (OUTPATIENT)
Dept: MEDICAL GROUP | Facility: PHYSICIAN GROUP | Age: 86
End: 2021-09-28
Payer: MEDICARE

## 2021-09-28 ENCOUNTER — TELEPHONE (OUTPATIENT)
Dept: MEDICAL GROUP | Facility: PHYSICIAN GROUP | Age: 86
End: 2021-09-28

## 2021-09-28 RX ORDER — RELUGOLIX 120 MG/1
120 TABLET, FILM COATED ORAL DAILY
COMMUNITY

## 2021-09-28 NOTE — TELEPHONE ENCOUNTER
Phone Number Called: [phone] ([facility] [place])    Call outcome: Left Message    Message: LVM for  asking for pt's records prior to his appt at 1:20pm per PCP's request.     Will fax over STAT records request as well.

## 2021-09-29 ENCOUNTER — TELEPHONE (OUTPATIENT)
Dept: CARDIOLOGY | Facility: MEDICAL CENTER | Age: 86
End: 2021-09-29

## 2021-09-29 ENCOUNTER — APPOINTMENT (OUTPATIENT)
Dept: MEDICAL GROUP | Facility: PHYSICIAN GROUP | Age: 86
End: 2021-09-29
Payer: MEDICARE

## 2021-09-29 NOTE — TELEPHONE ENCOUNTER
IGOR    Pt called stating his heart rate has been fluctuating from 60's to 95 and once got up to 100. Pt states these are the readings he is getting off his Apple watch. Pt states he is feeling fine. Pt would like a call back at [phone].    Thank you

## 2021-09-30 ENCOUNTER — NON-PROVIDER VISIT (OUTPATIENT)
Dept: CARDIOLOGY | Facility: MEDICAL CENTER | Age: 86
End: 2021-09-30
Payer: MEDICARE

## 2021-09-30 DIAGNOSIS — R00.1 SINUS BRADYCARDIA: ICD-10-CM

## 2021-09-30 DIAGNOSIS — Z95.0 S/P PLACEMENT OF CARDIAC PACEMAKER: ICD-10-CM

## 2021-09-30 PROCEDURE — 93280 PM DEVICE PROGR EVAL DUAL: CPT | Performed by: INTERNAL MEDICINE

## 2021-09-30 NOTE — TELEPHONE ENCOUNTER
"Called pt to discuss and he stated the following:  \"Over a period of time, I figured out what was going on.  I got really stressed trying to make a follow up appointment yesterday with the primary physician. That's the only time I had that issue...Anxiety, life, not being familiar with the pacemaker. I really felt I had an issue and it wasn't addressed last night.\"    He said he called our office and was hoping to receive a call back right away. Advised pt unfortunately due to high call volumes, we would need at least 24 hours to call back and respond to our patients. He wanted to know what he should do in case he has an issue with his PPM if he needed assistance immediately. Advised pt of ER precautions if he is experiencing a medical emergency. Advised he should send a manual transmission to our office if he has issues with his heart rate, heart rhythm, or PPM so our device techs can evaluate it. He says his device should be arriving within a week.    Reassured pt that he can continue to call our office or send us a AddSearcht message for any non-urgent questions. Pt stated he still has some stress but he has been feeling better since yesterday and his HR 70's now. He verbalized understanding of above and was appreciative of call back.  "

## 2021-10-15 ENCOUNTER — HOSPITAL ENCOUNTER (OUTPATIENT)
Dept: LAB | Facility: MEDICAL CENTER | Age: 86
End: 2021-10-15
Attending: PHYSICIAN ASSISTANT
Payer: MEDICARE

## 2021-10-15 LAB — TESTOST SERPL-MCNC: <10 NG/DL (ref 175–781)

## 2021-10-15 PROCEDURE — 84403 ASSAY OF TOTAL TESTOSTERONE: CPT

## 2021-10-15 PROCEDURE — 36415 COLL VENOUS BLD VENIPUNCTURE: CPT

## 2021-11-08 ENCOUNTER — HOSPITAL ENCOUNTER (OUTPATIENT)
Dept: LAB | Facility: MEDICAL CENTER | Age: 86
End: 2021-11-08
Attending: PHYSICIAN ASSISTANT
Payer: MEDICARE

## 2021-11-08 ENCOUNTER — NON-PROVIDER VISIT (OUTPATIENT)
Dept: CARDIOLOGY | Facility: MEDICAL CENTER | Age: 86
End: 2021-11-08
Payer: MEDICARE

## 2021-11-08 VITALS
BODY MASS INDEX: 25.37 KG/M2 | SYSTOLIC BLOOD PRESSURE: 112 MMHG | WEIGHT: 167.4 LBS | HEART RATE: 71 BPM | DIASTOLIC BLOOD PRESSURE: 60 MMHG | RESPIRATION RATE: 14 BRPM | OXYGEN SATURATION: 96 % | HEIGHT: 68 IN

## 2021-11-08 DIAGNOSIS — Z95.0 CARDIAC PACEMAKER IN SITU: ICD-10-CM

## 2021-11-08 DIAGNOSIS — R00.1 SINUS BRADYCARDIA: ICD-10-CM

## 2021-11-08 LAB — PSA SERPL-MCNC: <0.02 NG/ML (ref 0–4)

## 2021-11-08 PROCEDURE — 84153 ASSAY OF PSA TOTAL: CPT

## 2021-11-08 PROCEDURE — 36415 COLL VENOUS BLD VENIPUNCTURE: CPT

## 2021-11-08 PROCEDURE — 93280 PM DEVICE PROGR EVAL DUAL: CPT | Performed by: NURSE PRACTITIONER

## 2021-11-08 ASSESSMENT — FIBROSIS 4 INDEX: FIB4 SCORE: 2.15

## 2021-11-08 NOTE — PROGRESS NOTES
Patient had PM implanted on 9/24/2021 by Dr. Fulton. He is here today for final threshold checks. He is feeling better.     Device is working normally. No mode switching episodes.  Normal sensing of RA lead; unable to measure R waves. Stable capture of RA and RV leads; stable impedances. Battery longevity is 13.5 years.     Changes today include decreasing RA and RV outputs.    FU in 3 months for next PM check with me.

## 2022-01-14 ENCOUNTER — PATIENT MESSAGE (OUTPATIENT)
Dept: HEALTH INFORMATION MANAGEMENT | Facility: OTHER | Age: 87
End: 2022-01-14
Payer: MEDICARE

## 2022-02-08 ENCOUNTER — NON-PROVIDER VISIT (OUTPATIENT)
Dept: CARDIOLOGY | Facility: MEDICAL CENTER | Age: 87
End: 2022-02-08
Payer: MEDICARE

## 2022-02-08 VITALS
RESPIRATION RATE: 14 BRPM | BODY MASS INDEX: 25.25 KG/M2 | DIASTOLIC BLOOD PRESSURE: 60 MMHG | WEIGHT: 166.6 LBS | SYSTOLIC BLOOD PRESSURE: 104 MMHG | HEIGHT: 68 IN | OXYGEN SATURATION: 97 % | HEART RATE: 91 BPM

## 2022-02-08 DIAGNOSIS — Z95.0 CARDIAC PACEMAKER IN SITU: ICD-10-CM

## 2022-02-08 DIAGNOSIS — R00.1 SINUS BRADYCARDIA: ICD-10-CM

## 2022-02-08 PROCEDURE — 93280 PM DEVICE PROGR EVAL DUAL: CPT | Performed by: NURSE PRACTITIONER

## 2022-02-08 ASSESSMENT — FIBROSIS 4 INDEX: FIB4 SCORE: 2.15

## 2022-02-08 NOTE — PROGRESS NOTES
PM was implanted in September 2021; he has been feeling good since.    Device is working normally. No mode switching episodes.  Normal sensing and capture of RA and RV leads; stable impedances. Battery longevity is 11.2 years.  No changes are made today.    Follow-up in 6 months for next PM check with me.

## 2022-02-23 ENCOUNTER — HOSPITAL ENCOUNTER (OUTPATIENT)
Dept: LAB | Facility: MEDICAL CENTER | Age: 87
End: 2022-02-23
Attending: PHYSICIAN ASSISTANT
Payer: MEDICARE

## 2022-02-23 LAB — TESTOST SERPL-MCNC: <12 NG/DL (ref 175–781)

## 2022-02-23 PROCEDURE — 84403 ASSAY OF TOTAL TESTOSTERONE: CPT

## 2022-02-23 PROCEDURE — 36415 COLL VENOUS BLD VENIPUNCTURE: CPT

## 2022-03-29 ENCOUNTER — OFFICE VISIT (OUTPATIENT)
Dept: URGENT CARE | Facility: CLINIC | Age: 87
End: 2022-03-29
Payer: MEDICARE

## 2022-03-29 VITALS
HEIGHT: 68 IN | HEART RATE: 74 BPM | RESPIRATION RATE: 16 BRPM | SYSTOLIC BLOOD PRESSURE: 122 MMHG | DIASTOLIC BLOOD PRESSURE: 64 MMHG | WEIGHT: 165 LBS | OXYGEN SATURATION: 98 % | BODY MASS INDEX: 25.01 KG/M2 | TEMPERATURE: 97.8 F

## 2022-03-29 DIAGNOSIS — J06.9 VIRAL URI WITH COUGH: ICD-10-CM

## 2022-03-29 PROCEDURE — 99213 OFFICE O/P EST LOW 20 MIN: CPT | Performed by: NURSE PRACTITIONER

## 2022-03-29 RX ORDER — ALBUTEROL SULFATE 90 UG/1
2 AEROSOL, METERED RESPIRATORY (INHALATION) EVERY 6 HOURS PRN
Qty: 8.5 G | Refills: 0 | Status: SHIPPED | OUTPATIENT
Start: 2022-03-29 | End: 2022-12-22

## 2022-03-29 RX ORDER — BENZONATATE 100 MG/1
100 CAPSULE ORAL 3 TIMES DAILY PRN
Qty: 60 CAPSULE | Refills: 0 | Status: SHIPPED | OUTPATIENT
Start: 2022-03-29 | End: 2022-12-22

## 2022-03-29 ASSESSMENT — ENCOUNTER SYMPTOMS
SHORTNESS OF BREATH: 0
COUGH: 1
RHINORRHEA: 1
WHEEZING: 1
SORE THROAT: 0
FEVER: 0

## 2022-03-29 ASSESSMENT — COPD QUESTIONNAIRES: COPD: 0

## 2022-03-29 ASSESSMENT — FIBROSIS 4 INDEX: FIB4 SCORE: 2.15

## 2022-03-29 NOTE — PROGRESS NOTES
"Subjective     David Darling is a 86 y.o. male who presents with Cough, Sore Throat, Congestion, and Runny Nose (Started last night \"it came on really fast\" )            Cough  This is a new problem. Episode onset: pt reports new onset of sinus congestion and cough that started suddenly last night. No fever or chills. No ST. No recent sick contacts. The cough is non-productive. Associated symptoms include nasal congestion, rhinorrhea and wheezing. Pertinent negatives include no ear pain, fever, sore throat or shortness of breath. He has tried OTC cough suppressant (mucinex. he takes allegra daily for his seasonal allergies) for the symptoms. The treatment provided mild relief. There is no history of asthma or COPD.       Review of Systems   Constitutional: Negative for fever.   HENT: Positive for congestion and rhinorrhea. Negative for ear pain and sore throat.    Respiratory: Positive for cough and wheezing. Negative for shortness of breath.    All other systems reviewed and are negative.         Past Medical History:   Diagnosis Date   • Allergy    • Anemia    • Arrhythmia     Atrial Fib   • Bronchitis 2019   • Cancer ([facility]) 2010    Prostate   • COPD (chronic obstructive pulmonary disease) ([facility])    • Macular degeneration    • Muscle disorder    • Psychiatric problem     depression and anxiety   • Sinusitis, chronic    • Strep throat       Past Surgical History:   Procedure Laterality Date   • KNEE ARTHROSCOPY        Social History     Socioeconomic History   • Marital status: Single     Spouse name: Not on file   • Number of children: Not on file   • Years of education: Not on file   • Highest education level: Not on file   Occupational History   • Not on file   Tobacco Use   • Smoking status: Never Smoker   • Smokeless tobacco: Never Used   Substance and Sexual Activity   • Alcohol use: No   • Drug use: Yes     Types: Oral     Comment: marijuan tincture  for sleep nightly   • Sexual activity: Never   Other " "Topics Concern   • Not on file   Social History Narrative   • Not on file     Social Determinants of Health     Financial Resource Strain: Not on file   Food Insecurity: Not on file   Transportation Needs: Not on file   Physical Activity: Not on file   Stress: Not on file   Social Connections: Not on file   Intimate Partner Violence: Not on file   Housing Stability: Not on file         Objective     /64   Pulse 74   Temp 36.6 °C (97.8 °F) (Temporal)   Resp 16   Ht 1.727 m (5' 8\")   Wt 74.8 kg (165 lb)   SpO2 98%   BMI 25.09 kg/m²      Physical Exam  Vitals and nursing note reviewed.   Constitutional:       Appearance: Normal appearance.   HENT:      Head: Normocephalic and atraumatic.      Right Ear: Tympanic membrane and external ear normal.      Left Ear: Tympanic membrane and external ear normal.      Nose: Congestion present.      Mouth/Throat:      Mouth: Mucous membranes are moist.      Pharynx: Oropharynx is clear.   Eyes:      Extraocular Movements: Extraocular movements intact.      Pupils: Pupils are equal, round, and reactive to light.   Cardiovascular:      Rate and Rhythm: Normal rate and regular rhythm.      Heart sounds: Normal heart sounds.   Pulmonary:      Effort: Pulmonary effort is normal.      Breath sounds: Normal breath sounds.   Musculoskeletal:         General: Normal range of motion.      Cervical back: Normal range of motion and neck supple.   Skin:     General: Skin is warm and dry.      Capillary Refill: Capillary refill takes less than 2 seconds.   Neurological:      General: No focal deficit present.      Mental Status: He is alert and oriented to person, place, and time. Mental status is at baseline.   Psychiatric:         Mood and Affect: Mood normal.         Thought Content: Thought content normal.         Judgment: Judgment normal.                             Assessment & Plan        1. Viral URI with cough  - benzonatate (TESSALON) 100 MG Cap; Take 1 Capsule by mouth " 3 times a day as needed for Cough.  Dispense: 60 Capsule; Refill: 0  - albuterol 108 (90 Base) MCG/ACT Aero Soln inhalation aerosol; Inhale 2 Puffs every 6 hours as needed.  Dispense: 8.5 g; Refill: 0          Discussed with patient symptoms are viral in nature, there is low concern for any respiratory infection currently. Antibiotics are not advised at this time.  Continue with allegra daily  Encouraged rest and fluids  Advised pt to RTC in one week if not improving  Supportive care, differential diagnoses, and indications for immediate follow-up discussed with patient.    Pathogenesis of diagnosis discussed including typical length and natural progression.      Instructed to return to  or nearest emergency department if symptoms fail to improve, for any change in condition, further concerns, or new concerning symptoms.  Patient states understanding of the plan of care and discharge instructions.

## 2022-04-12 ENCOUNTER — OFFICE VISIT (OUTPATIENT)
Dept: URGENT CARE | Facility: CLINIC | Age: 87
End: 2022-04-12
Payer: MEDICARE

## 2022-04-12 VITALS
OXYGEN SATURATION: 97 % | HEIGHT: 68 IN | RESPIRATION RATE: 17 BRPM | SYSTOLIC BLOOD PRESSURE: 112 MMHG | DIASTOLIC BLOOD PRESSURE: 70 MMHG | BODY MASS INDEX: 25.01 KG/M2 | WEIGHT: 165 LBS | HEART RATE: 76 BPM | TEMPERATURE: 98 F

## 2022-04-12 DIAGNOSIS — J01.00 ACUTE NON-RECURRENT MAXILLARY SINUSITIS: ICD-10-CM

## 2022-04-12 PROBLEM — N52.9 ED (ERECTILE DYSFUNCTION): Status: ACTIVE | Noted: 2022-03-03

## 2022-04-12 PROBLEM — M81.0 OSTEOPOROSIS: Status: ACTIVE | Noted: 2022-04-06

## 2022-04-12 PROBLEM — R00.1 BRADYCARDIA: Status: ACTIVE | Noted: 2022-03-03

## 2022-04-12 PROCEDURE — 99214 OFFICE O/P EST MOD 30 MIN: CPT | Performed by: FAMILY MEDICINE

## 2022-04-12 RX ORDER — TRIAMCINOLONE ACETONIDE 40 MG/ML
60 INJECTION, SUSPENSION INTRA-ARTICULAR; INTRAMUSCULAR ONCE
Status: COMPLETED | OUTPATIENT
Start: 2022-04-12 | End: 2022-04-12

## 2022-04-12 RX ORDER — DOXYCYCLINE HYCLATE 100 MG
100 TABLET ORAL 2 TIMES DAILY
Qty: 14 TABLET | Refills: 0 | Status: SHIPPED | OUTPATIENT
Start: 2022-04-12 | End: 2022-04-19

## 2022-04-12 RX ORDER — FLUTICASONE PROPIONATE 50 MCG
1 SPRAY, SUSPENSION (ML) NASAL 2 TIMES DAILY
Qty: 16 G | Refills: 0 | Status: SHIPPED | OUTPATIENT
Start: 2022-04-12 | End: 2022-04-19

## 2022-04-12 RX ORDER — BENZONATATE 100 MG/1
100 CAPSULE ORAL 3 TIMES DAILY PRN
Qty: 30 CAPSULE | Refills: 0 | Status: SHIPPED | OUTPATIENT
Start: 2022-04-12 | End: 2022-12-22

## 2022-04-12 RX ADMIN — TRIAMCINOLONE ACETONIDE 60 MG: 40 INJECTION, SUSPENSION INTRA-ARTICULAR; INTRAMUSCULAR at 16:53

## 2022-04-12 ASSESSMENT — FIBROSIS 4 INDEX: FIB4 SCORE: 2.15

## 2022-04-13 NOTE — PROGRESS NOTES
Chief Complaint   Patient presents with   • Seasonal Allergies     Seasonal allergies , patient requesting kenalog    • Cough     Cough x 2 weeks , tested negative for Covid twice       Cough  This is a new problem. The current episode started 14 days ago. The problem has been gradually worsening. The problem occurs constantly. The cough is productive of yellow sputum. Associated symptoms include : headaches, fatigue, nasal congestion.   + fevers at home.    States cough is making it hard to sleep at night.   Pertinent negatives include no   nausea, vomiting, diarrhea, sweats, weight loss or wheezing. Nothing aggravates the symptoms.  Patient has tried several OTC cold products for the symptoms - no improvement. There is no history of asthma, but he has hx of seasonal allergies, worse in the spring.        Past Medical History:   Diagnosis Date   • Allergy    • Anemia    • Arrhythmia     Atrial Fib   • Bronchitis 2019   • Cancer ([facility]) 2010    Prostate   • COPD (chronic obstructive pulmonary disease) ([facility])    • Macular degeneration    • Muscle disorder    • Psychiatric problem     depression and anxiety   • Sinusitis, chronic    • Strep throat          Social History     Tobacco Use   • Smoking status: Never Smoker   • Smokeless tobacco: Never Used   Substance Use Topics   • Alcohol use: No   • Drug use: Yes     Types: Oral     Comment: marijuan tincture  for sleep nightly                 ROS  HENT - denies  ear pain.   Positive congestion, sore throat  Eyes: denies vision changes, discharge  Respiratory: Negative for hemoptysis and wheezing.    Cardiovascular: Negative for chest pain or PND.   Gastrointestinal:  No abdominal pain,  nausea, vomiting, diarrhea.  Negative for  blood in stool.    - no discharge, dysuria, frequency.      Neurological: Negative for dizziness.   + headaches.   musculoskeletal - denies myalgias, calf pain  Psych - denies anxiety/depression/mood changes.  Skin: no itching or rash  All  "other systems reviewed and are negative.             Objective:     /70 (BP Location: Left arm, Patient Position: Sitting, BP Cuff Size: Adult)   Pulse 76   Temp 36.7 °C (98 °F) (Temporal)   Resp 17   Ht 1.727 m (5' 8\")   Wt 74.8 kg (165 lb)   SpO2 97%     Physical Exam   Constitutional: patient is oriented to person, place, and time. Patient appears well-developed and well-nourished. No distress.   HENT:   Head: Normocephalic and atraumatic.   Right Ear: External ear normal.   Left Ear: External ear normal.   TMs both normal  Nose: Mucosal edema  present.   There is tenderness to percussion over left and right sinuses  Mouth/Throat: Mucous membranes are normal. No oral lesions.  No posterior pharyngeal erythema.  No oropharyngeal exudate or posterior oropharyngeal edema. t   Eyes: Conjunctivae and EOM are normal. Pupils are equal, round, and reactive to light. Right eye exhibits no discharge. Left eye exhibits no discharge. No scleral icterus.   Neck: Normal range of motion. Neck supple. No tracheal deviation present.   Cardiovascular: Normal rate, regular rhythm and normal heart sounds.  Exam reveals no friction rub.    Pulmonary/Chest: Effort normal. No respiratory distress. Patient has no wheezes or rhonchi. Patient has no rales.    Musculoskeletal:  exhibits no edema.   Lymphadenopathy:     Patient has  cervical adenopathy.      Neurological: patient is alert and oriented to person, place, and time.   CN 2-12 intact  Skin: Skin is warm and dry. No rash noted. No erythema.   Psychiatric: patient  has a normal mood and affect.  behavior is normal.   Nursing note and vitals reviewed.              Assessment/Plan:           1. Acute non-recurrent maxillary sinusitis   .  - doxycycline (VIBRAMYCIN) 100 MG Tab; Take 1 Tablet by mouth 2 times a day for 7 days.  Dispense: 14 Tablet; Refill: 0  - fluticasone (FLONASE) 50 MCG/ACT nasal spray; Administer 1 Spray into affected nostril(S) 2 times a day for 7 " days.  Dispense: 16 g; Refill: 0  - triamcinolone acetonide (KENALOG-40) injection 60 mg  - benzonatate (TESSALON PERLES) 100 MG Cap; Take 1 Capsule by mouth 3 times a day as needed for Cough.  Dispense: 30 Capsule; Refill: 0        Follow up in one week if no improvement, sooner if symptoms worsen.

## 2022-04-28 ENCOUNTER — TELEPHONE (OUTPATIENT)
Dept: HEALTH INFORMATION MANAGEMENT | Facility: OTHER | Age: 87
End: 2022-04-28

## 2022-05-09 ENCOUNTER — APPOINTMENT (OUTPATIENT)
Dept: RADIOLOGY | Facility: IMAGING CENTER | Age: 87
End: 2022-05-09
Attending: PHYSICIAN ASSISTANT
Payer: MEDICARE

## 2022-05-09 ENCOUNTER — OFFICE VISIT (OUTPATIENT)
Dept: URGENT CARE | Facility: CLINIC | Age: 87
End: 2022-05-09
Payer: MEDICARE

## 2022-05-09 VITALS
WEIGHT: 165 LBS | HEIGHT: 68 IN | DIASTOLIC BLOOD PRESSURE: 72 MMHG | RESPIRATION RATE: 16 BRPM | HEART RATE: 74 BPM | BODY MASS INDEX: 25.01 KG/M2 | SYSTOLIC BLOOD PRESSURE: 112 MMHG | OXYGEN SATURATION: 97 % | TEMPERATURE: 96.7 F

## 2022-05-09 DIAGNOSIS — J18.9 PNEUMONIA OF RIGHT LOWER LOBE DUE TO INFECTIOUS ORGANISM: ICD-10-CM

## 2022-05-09 DIAGNOSIS — R05.9 COUGH: ICD-10-CM

## 2022-05-09 PROCEDURE — 99214 OFFICE O/P EST MOD 30 MIN: CPT | Performed by: PHYSICIAN ASSISTANT

## 2022-05-09 PROCEDURE — 71046 X-RAY EXAM CHEST 2 VIEWS: CPT | Mod: TC,FY | Performed by: PHYSICIAN ASSISTANT

## 2022-05-09 RX ORDER — ALBUTEROL SULFATE 90 UG/1
2 AEROSOL, METERED RESPIRATORY (INHALATION) EVERY 6 HOURS PRN
Qty: 8.5 G | Refills: 0 | Status: SHIPPED | OUTPATIENT
Start: 2022-05-09 | End: 2023-06-22 | Stop reason: SDUPTHER

## 2022-05-09 RX ORDER — CEFUROXIME AXETIL 500 MG/1
500 TABLET ORAL 2 TIMES DAILY
Qty: 10 TABLET | Refills: 0 | Status: SHIPPED | OUTPATIENT
Start: 2022-05-09 | End: 2022-05-14

## 2022-05-09 RX ORDER — DOXYCYCLINE 100 MG/1
100 CAPSULE ORAL 2 TIMES DAILY
Qty: 10 CAPSULE | Refills: 0 | Status: SHIPPED | OUTPATIENT
Start: 2022-05-09 | End: 2022-05-14

## 2022-05-09 ASSESSMENT — FIBROSIS 4 INDEX: FIB4 SCORE: 2.15

## 2022-05-09 NOTE — PROGRESS NOTES
"Subjective:   David Darling is a 86 y.o. male who presents for Cough (X6-7 weeks, Productive cough, was seen on 4/12/22 has finished the medication and has not gotten better, )      HPI  The patient is an 86-year-old male who presents to clinic with complaints of a cough onset 6 to 7 weeks ago.  He presented to the urgent care on 4/12 and was diagnosed a sinus infection so he was placed on doxycycline for 7 days, fluticasone, Kenalog injection and Tessalon Perles.  He states he felt like he was getting better and then his symptoms returned.  His cough is lingering and sometimes productive.  Denies any worsening.  He will have coughing attacks.  He has been taking allergy pill every day as well.  He believes this may be bronchitis. Denies any fever, chills, chest pain, shortness of breath, vomiting, diarrhea. Fully COVID vaccinated.  History of prostate cancer.      Medications:    • albuterol Aers  • Apalutamide Tabs  • benzonatate Caps  • Diclofenac Sodium Gel  • escitalopram Tabs  • fexofenadine Tabs  • fluticasone  • levothyroxine Tabs  • Orgovyx Tabs  • tamsulosin    Allergies: Augmentin    Problem List: David Darling does not have any pertinent problems on file.    Surgical History:  Past Surgical History:   Procedure Laterality Date   • KNEE ARTHROSCOPY         Past Social Hx: David Darling  reports that he has never smoked. He has never used smokeless tobacco. He reports current drug use. Drug: Oral. He reports that he does not drink alcohol.     Past Family Hx:  David Darling family history includes Cancer in his father and mother; Heart Disease in his father; Stroke in his sister.     Problem list, medications, and allergies reviewed by myself today in Epic.     Objective:     /72   Pulse 74   Temp 35.9 °C (96.7 °F) (Temporal)   Resp 16   Ht 1.727 m (5' 8\")   Wt 74.8 kg (165 lb)   SpO2 97%   BMI 25.09 kg/m²     Physical Exam  Vitals reviewed.   Constitutional:       General: He is not " in acute distress.     Appearance: Normal appearance. He is not ill-appearing or toxic-appearing.   HENT:      Mouth/Throat:      Mouth: Mucous membranes are moist.      Pharynx: Oropharynx is clear. No oropharyngeal exudate or posterior oropharyngeal erythema.   Eyes:      Conjunctiva/sclera: Conjunctivae normal.      Pupils: Pupils are equal, round, and reactive to light.   Cardiovascular:      Rate and Rhythm: Normal rate and regular rhythm.      Heart sounds: Normal heart sounds.   Pulmonary:      Effort: Pulmonary effort is normal. No respiratory distress.      Breath sounds: Rhonchi (clears with cough ) present. No wheezing or rales.   Musculoskeletal:      Cervical back: Neck supple.   Skin:     General: Skin is warm and dry.   Neurological:      General: No focal deficit present.      Mental Status: He is alert and oriented to person, place, and time.   Psychiatric:         Mood and Affect: Mood normal.         Behavior: Behavior normal.       RADIOLOGY RESULTS   DX-CHEST-2 VIEWS    Result Date: 5/9/2022 5/9/2022 3:58 PM HISTORY/REASON FOR EXAM:  Cough. TECHNIQUE/EXAM DESCRIPTION AND NUMBER OF VIEWS: Two views of the chest. COMPARISON:  9/25/2021. FINDINGS: Right chest cardiac device. The heart is normal in size. Atherosclerotic calcifications of the aortic arch. Left lung appears clear. Hazy opacity at the right base. No pleural effusions are appreciated.     Hazy right base opacity could represent developing pneumonia.           Diagnosis and associated orders:     1. Pneumonia of right lower lobe due to infectious organism  - cefUROXime (CEFTIN) 500 MG Tab; Take 1 Tablet by mouth 2 times a day for 5 days.  Dispense: 10 Tablet; Refill: 0  - doxycycline (MONODOX) 100 MG capsule; Take 1 Capsule by mouth 2 times a day for 5 days.  Dispense: 10 Capsule; Refill: 0  - Referral to establish with [facility] PCP    2. Cough  - DX-CHEST-2 VIEWS; Future  - albuterol 108 (90 Base) MCG/ACT Aero Soln inhalation aerosol;  Inhale 2 Puffs every 6 hours as needed for Shortness of Breath.  Dispense: 8.5 g; Refill: 0  - Referral to establish with [facility] PCP       Comments/MDM:     • This is an 86-year-old male who presents to clinic with complaints of a cough onset 6 to 7 weeks ago.  He was initially seen in the clinic a little under a month ago and placed on doxycycline for 7 days out of concerns for sinus infection.  Exam findings above.  He is in no acute distress.  Normal oxygen saturation on room air.  Afebrile. X-ray results per radiologist interpretation above. I personally reviewed images and radiologist report which showed developing pneumonia.  We will start patient on outpatient antibiotic therapy.  He will start Ceftin as well as doxycycline and take for 5 days.  Albuterol inhaler for any wheezing or coughing attacks.  Increase fluid intake.  Encouraged Mucinex DM, nasal saline washes, Flonase nasal spray, nonsedating oral antihistamine every day.       I personally reviewed prior external notes and test results pertinent to today's visit. Red flags discussed and indications to present to the Emergency Department. Supportive care, natural history, differential diagnoses, and indications for immediate follow-up discussed. Patient expresses understanding and agrees to plan. Patient denies any other questions or concerns.     Follow-up with the primary care physician for recheck, reevaluation, and consideration of further management.    Please note that this dictation was created using voice recognition software. I have made a reasonable attempt to correct obvious errors, but I expect that there are errors of grammar and possibly content that I did not discover before finalizing the note.    This note was electronically signed by Royer Gaviria PA-C

## 2022-05-19 ENCOUNTER — HOSPITAL ENCOUNTER (OUTPATIENT)
Dept: LAB | Facility: MEDICAL CENTER | Age: 87
End: 2022-05-19
Attending: PHYSICIAN ASSISTANT
Payer: MEDICARE

## 2022-05-19 LAB
ALBUMIN SERPL BCP-MCNC: 4 G/DL (ref 3.2–4.9)
ALBUMIN/GLOB SERPL: 1.6 G/DL
ALP SERPL-CCNC: 80 U/L (ref 30–99)
ALT SERPL-CCNC: 14 U/L (ref 2–50)
ANION GAP SERPL CALC-SCNC: 9 MMOL/L (ref 7–16)
AST SERPL-CCNC: 20 U/L (ref 12–45)
BILIRUB SERPL-MCNC: 0.3 MG/DL (ref 0.1–1.5)
BUN SERPL-MCNC: 20 MG/DL (ref 8–22)
CALCIUM SERPL-MCNC: 9.1 MG/DL (ref 8.4–10.2)
CHLORIDE SERPL-SCNC: 105 MMOL/L (ref 96–112)
CO2 SERPL-SCNC: 24 MMOL/L (ref 20–33)
CREAT SERPL-MCNC: 0.81 MG/DL (ref 0.5–1.4)
FASTING STATUS PATIENT QL REPORTED: NORMAL
GFR SERPLBLD CREATININE-BSD FMLA CKD-EPI: 86 ML/MIN/1.73 M 2
GLOBULIN SER CALC-MCNC: 2.5 G/DL (ref 1.9–3.5)
GLUCOSE SERPL-MCNC: 101 MG/DL (ref 65–99)
POTASSIUM SERPL-SCNC: 4.7 MMOL/L (ref 3.6–5.5)
PROT SERPL-MCNC: 6.5 G/DL (ref 6–8.2)
PSA SERPL-MCNC: 0.02 NG/ML (ref 0–4)
SODIUM SERPL-SCNC: 138 MMOL/L (ref 135–145)
TESTOST SERPL-MCNC: <12 NG/DL (ref 175–781)

## 2022-05-19 PROCEDURE — 84403 ASSAY OF TOTAL TESTOSTERONE: CPT

## 2022-05-19 PROCEDURE — 36415 COLL VENOUS BLD VENIPUNCTURE: CPT

## 2022-05-19 PROCEDURE — 84153 ASSAY OF PSA TOTAL: CPT

## 2022-05-19 PROCEDURE — 80053 COMPREHEN METABOLIC PANEL: CPT

## 2022-06-06 ENCOUNTER — TELEPHONE (OUTPATIENT)
Dept: MEDICAL GROUP | Facility: LAB | Age: 87
End: 2022-06-06
Payer: MEDICARE

## 2022-06-14 ENCOUNTER — TELEPHONE (OUTPATIENT)
Dept: MEDICAL GROUP | Facility: LAB | Age: 87
End: 2022-06-14
Payer: MEDICARE

## 2022-06-14 RX ORDER — CEFUROXIME AXETIL 500 MG/1
TABLET ORAL
COMMUNITY
End: 2022-06-14

## 2022-06-14 RX ORDER — DOXYCYCLINE HYCLATE 100 MG
TABLET ORAL
COMMUNITY
End: 2022-12-14

## 2022-06-14 RX ORDER — DOXYCYCLINE 100 MG/1
CAPSULE ORAL
COMMUNITY
End: 2022-06-14

## 2022-06-24 ENCOUNTER — NON-PROVIDER VISIT (OUTPATIENT)
Dept: CARDIOLOGY | Facility: MEDICAL CENTER | Age: 87
End: 2022-06-24
Payer: MEDICARE

## 2022-06-24 PROCEDURE — 93294 REM INTERROG EVL PM/LDLS PM: CPT | Performed by: INTERNAL MEDICINE

## 2022-07-11 ENCOUNTER — HOSPITAL ENCOUNTER (OUTPATIENT)
Dept: LAB | Facility: MEDICAL CENTER | Age: 87
End: 2022-07-11
Attending: PHYSICIAN ASSISTANT
Payer: MEDICARE

## 2022-07-11 LAB
ALBUMIN SERPL BCP-MCNC: 4.2 G/DL (ref 3.2–4.9)
ALBUMIN/GLOB SERPL: 1.8 G/DL
ALP SERPL-CCNC: 90 U/L (ref 30–99)
ALT SERPL-CCNC: 13 U/L (ref 2–50)
ANION GAP SERPL CALC-SCNC: 10 MMOL/L (ref 7–16)
AST SERPL-CCNC: 18 U/L (ref 12–45)
BILIRUB SERPL-MCNC: 0.4 MG/DL (ref 0.1–1.5)
BUN SERPL-MCNC: 25 MG/DL (ref 8–22)
CALCIUM SERPL-MCNC: 9 MG/DL (ref 8.4–10.2)
CHLORIDE SERPL-SCNC: 104 MMOL/L (ref 96–112)
CO2 SERPL-SCNC: 24 MMOL/L (ref 20–33)
CREAT SERPL-MCNC: 0.89 MG/DL (ref 0.5–1.4)
GFR SERPLBLD CREATININE-BSD FMLA CKD-EPI: 83 ML/MIN/1.73 M 2
GLOBULIN SER CALC-MCNC: 2.4 G/DL (ref 1.9–3.5)
GLUCOSE SERPL-MCNC: 101 MG/DL (ref 65–99)
POTASSIUM SERPL-SCNC: 4.5 MMOL/L (ref 3.6–5.5)
PROT SERPL-MCNC: 6.6 G/DL (ref 6–8.2)
PSA SERPL-MCNC: <0.02 NG/ML (ref 0–4)
SODIUM SERPL-SCNC: 138 MMOL/L (ref 135–145)
TESTOST SERPL-MCNC: <12 NG/DL (ref 175–781)

## 2022-07-11 PROCEDURE — 84403 ASSAY OF TOTAL TESTOSTERONE: CPT

## 2022-07-11 PROCEDURE — 36415 COLL VENOUS BLD VENIPUNCTURE: CPT

## 2022-07-11 PROCEDURE — 84153 ASSAY OF PSA TOTAL: CPT

## 2022-07-11 PROCEDURE — 80053 COMPREHEN METABOLIC PANEL: CPT

## 2022-07-12 ENCOUNTER — OFFICE VISIT (OUTPATIENT)
Dept: URGENT CARE | Facility: CLINIC | Age: 87
End: 2022-07-12
Payer: MEDICARE

## 2022-07-12 VITALS
HEART RATE: 68 BPM | TEMPERATURE: 97.4 F | WEIGHT: 163 LBS | HEIGHT: 68 IN | SYSTOLIC BLOOD PRESSURE: 122 MMHG | BODY MASS INDEX: 24.71 KG/M2 | RESPIRATION RATE: 20 BRPM | OXYGEN SATURATION: 98 % | DIASTOLIC BLOOD PRESSURE: 62 MMHG

## 2022-07-12 DIAGNOSIS — Z20.822 SUSPECTED COVID-19 VIRUS INFECTION: ICD-10-CM

## 2022-07-12 DIAGNOSIS — J01.90 ACUTE BACTERIAL SINUSITIS: ICD-10-CM

## 2022-07-12 DIAGNOSIS — B96.89 ACUTE BACTERIAL SINUSITIS: ICD-10-CM

## 2022-07-12 LAB
EXTERNAL QUALITY CONTROL: NORMAL
SARS-COV+SARS-COV-2 AG RESP QL IA.RAPID: NEGATIVE

## 2022-07-12 PROCEDURE — 99213 OFFICE O/P EST LOW 20 MIN: CPT | Mod: CS | Performed by: NURSE PRACTITIONER

## 2022-07-12 PROCEDURE — 87426 SARSCOV CORONAVIRUS AG IA: CPT | Performed by: NURSE PRACTITIONER

## 2022-07-12 RX ORDER — AZITHROMYCIN 250 MG/1
TABLET, FILM COATED ORAL
Qty: 6 TABLET | Refills: 0 | Status: SHIPPED | OUTPATIENT
Start: 2022-07-12 | End: 2022-12-22

## 2022-07-12 ASSESSMENT — ENCOUNTER SYMPTOMS
SINUS PAIN: 1
FEVER: 0
VOMITING: 0
ABDOMINAL PAIN: 0
COUGH: 0
HEADACHES: 1
RHINORRHEA: 1
DIARRHEA: 0
NAUSEA: 0
CHILLS: 1
SORE THROAT: 0

## 2022-07-12 ASSESSMENT — FIBROSIS 4 INDEX: FIB4 SCORE: 1.78

## 2022-07-12 NOTE — PROGRESS NOTES
Subjective:     David Darling is a 86 y.o. male who presents for Sinus Problem (Sinus pain and pressure, fatigued, lack of energy, lethargic has worsened in the last 4 days, cough/congested )      URI   This is a new problem. The current episode started in the past 7 days (One week ago David developed sinus pressure. 4 days ago his symptoms worsened with a headache, sinus congestion, and fatigue. ). The problem has been gradually worsening. There has been no fever. Associated symptoms include congestion, headaches, rhinorrhea and sinus pain. Pertinent negatives include no abdominal pain, coughing, diarrhea, nausea, sore throat or vomiting. He has tried decongestant (vicks) for the symptoms.         Review of Systems   Constitutional: Positive for chills and malaise/fatigue. Negative for fever.   HENT: Positive for congestion, rhinorrhea and sinus pain. Negative for sore throat.    Respiratory: Negative for cough.    Gastrointestinal: Negative for abdominal pain, diarrhea, nausea and vomiting.   Neurological: Positive for headaches.       PMH:   Past Medical History:   Diagnosis Date   • Allergy    • Anemia    • Arrhythmia     Atrial Fib   • Bronchitis 2019   • Cancer ([facility]) 2010    Prostate   • COPD (chronic obstructive pulmonary disease) ([facility])    • Macular degeneration    • Muscle disorder    • Psychiatric problem     depression and anxiety   • Sinusitis, chronic    • Strep throat      ALLERGIES:   Allergies   Allergen Reactions   • Augmentin Vomiting     SURGHX:   Past Surgical History:   Procedure Laterality Date   • KNEE ARTHROSCOPY       SOCHX:   Social History     Socioeconomic History   • Marital status: Single   Tobacco Use   • Smoking status: Never Smoker   • Smokeless tobacco: Never Used   Vaping Use   • Vaping Use: Never used   Substance and Sexual Activity   • Alcohol use: No   • Drug use: Not Currently     Types: Oral     Comment: marijuan tincture  for sleep nightly   • Sexual activity: Never     FH:  "  Family History   Problem Relation Age of Onset   • Cancer Mother    • Heart Disease Father    • Cancer Father    • Stroke Sister          Objective:   /62 (BP Location: Left arm, Patient Position: Sitting, BP Cuff Size: Adult)   Pulse 68   Temp 36.3 °C (97.4 °F) (Temporal)   Resp 20   Ht 1.727 m (5' 8\")   Wt 73.9 kg (163 lb)   SpO2 98%   BMI 24.78 kg/m²     Physical Exam  Vitals and nursing note reviewed.   Constitutional:       General: He is not in acute distress.     Appearance: Normal appearance. He is ill-appearing. He is not toxic-appearing or diaphoretic.   HENT:      Head: Normocephalic and atraumatic.      Right Ear: External ear normal.      Left Ear: External ear normal.      Nose: Congestion present. No rhinorrhea.      Right Turbinates: Enlarged and swollen.      Left Turbinates: Enlarged and swollen.      Right Sinus: Maxillary sinus tenderness and frontal sinus tenderness present.      Left Sinus: Maxillary sinus tenderness and frontal sinus tenderness present.      Mouth/Throat:      Mouth: Mucous membranes are moist.      Pharynx: No oropharyngeal exudate or posterior oropharyngeal erythema.   Eyes:      Extraocular Movements: Extraocular movements intact.      Pupils: Pupils are equal, round, and reactive to light.   Cardiovascular:      Rate and Rhythm: Normal rate and regular rhythm.      Pulses: Normal pulses.      Heart sounds: Normal heart sounds.   Pulmonary:      Effort: Pulmonary effort is normal. No respiratory distress.      Breath sounds: Normal breath sounds. No stridor. No wheezing, rhonchi or rales.   Chest:      Chest wall: No tenderness.   Abdominal:      General: Abdomen is flat. Bowel sounds are normal.      Palpations: Abdomen is soft.      Tenderness: There is no abdominal tenderness. There is no right CVA tenderness or left CVA tenderness.   Musculoskeletal:         General: Normal range of motion.      Cervical back: Normal range of motion and neck supple. "   Skin:     General: Skin is warm and dry.      Capillary Refill: Capillary refill takes less than 2 seconds.   Neurological:      General: No focal deficit present.      Mental Status: He is alert and oriented to person, place, and time. Mental status is at baseline.   Psychiatric:         Mood and Affect: Mood normal.         Behavior: Behavior normal.         Thought Content: Thought content normal.         Judgment: Judgment normal.       POCT covid: negative    Assessment/Plan:   Assessment    1. Acute bacterial sinusitis  azithromycin (ZITHROMAX) 250 MG Tab   2. Suspected COVID-19 virus infection  POCT SARS-COV Antigen BRITTNI (Symptomatic only)     Prescriptions called into pharmacy. AVS printed and reviewed with pt. Red flags identified of when to seek care back in UC or ER including SOB, increased fever and worsening symptoms. Symptomatic treatment such as OTC Ibuprofen/ Acetaminophen, increased fluids, flonase and humidifier encouraged Pt in agreement with care plan today.    AVS handout given and reviewed with patient. Pt educated on red flags and when to seek treatment back in ER or UC.

## 2022-07-15 NOTE — CARDIAC REMOTE MONITOR - SCAN
Device transmission reviewed. Device demonstrated appropriate function.       Electronically Signed by: Brandon Rubin M.D.    7/16/2022  9:04 AM

## 2022-08-16 ENCOUNTER — HOSPITAL ENCOUNTER (OUTPATIENT)
Dept: LAB | Facility: MEDICAL CENTER | Age: 87
End: 2022-08-16
Attending: UROLOGY
Payer: MEDICARE

## 2022-08-16 LAB
ALBUMIN SERPL BCP-MCNC: 4.3 G/DL (ref 3.2–4.9)
ALBUMIN/GLOB SERPL: 2 G/DL
ALP SERPL-CCNC: 93 U/L (ref 30–99)
ALT SERPL-CCNC: 12 U/L (ref 2–50)
ANION GAP SERPL CALC-SCNC: 9 MMOL/L (ref 7–16)
AST SERPL-CCNC: 17 U/L (ref 12–45)
BILIRUB SERPL-MCNC: 0.3 MG/DL (ref 0.1–1.5)
BUN SERPL-MCNC: 18 MG/DL (ref 8–22)
CALCIUM SERPL-MCNC: 9 MG/DL (ref 8.4–10.2)
CHLORIDE SERPL-SCNC: 104 MMOL/L (ref 96–112)
CHOLEST SERPL-MCNC: 175 MG/DL (ref 100–199)
CO2 SERPL-SCNC: 24 MMOL/L (ref 20–33)
CREAT SERPL-MCNC: 0.91 MG/DL (ref 0.5–1.4)
FASTING STATUS PATIENT QL REPORTED: NORMAL
GFR SERPLBLD CREATININE-BSD FMLA CKD-EPI: 82 ML/MIN/1.73 M 2
GLOBULIN SER CALC-MCNC: 2.1 G/DL (ref 1.9–3.5)
GLUCOSE SERPL-MCNC: 126 MG/DL (ref 65–99)
HDLC SERPL-MCNC: 62 MG/DL
LDLC SERPL CALC-MCNC: 85 MG/DL
POTASSIUM SERPL-SCNC: 4.3 MMOL/L (ref 3.6–5.5)
PROT SERPL-MCNC: 6.4 G/DL (ref 6–8.2)
SODIUM SERPL-SCNC: 137 MMOL/L (ref 135–145)
TESTOST SERPL-MCNC: <12 NG/DL (ref 175–781)
TRIGL SERPL-MCNC: 141 MG/DL (ref 0–149)

## 2022-08-16 PROCEDURE — 80053 COMPREHEN METABOLIC PANEL: CPT

## 2022-08-16 PROCEDURE — 80061 LIPID PANEL: CPT

## 2022-08-16 PROCEDURE — 84153 ASSAY OF PSA TOTAL: CPT

## 2022-08-16 PROCEDURE — 36415 COLL VENOUS BLD VENIPUNCTURE: CPT

## 2022-08-16 PROCEDURE — 84403 ASSAY OF TOTAL TESTOSTERONE: CPT

## 2022-08-17 LAB — PSA SERPL-MCNC: 0.02 NG/ML (ref 0–4)

## 2022-08-25 ENCOUNTER — HOSPITAL ENCOUNTER (OUTPATIENT)
Dept: RADIOLOGY | Facility: MEDICAL CENTER | Age: 87
End: 2022-08-25
Attending: PHYSICIAN ASSISTANT
Payer: MEDICARE

## 2022-08-25 DIAGNOSIS — M81.0 AGE-RELATED OSTEOPOROSIS WITHOUT CURRENT PATHOLOGICAL FRACTURE: ICD-10-CM

## 2022-08-25 PROCEDURE — 77080 DXA BONE DENSITY AXIAL: CPT

## 2022-09-15 ENCOUNTER — TELEPHONE (OUTPATIENT)
Dept: MEDICAL GROUP | Facility: LAB | Age: 87
End: 2022-09-15
Payer: MEDICARE

## 2022-09-15 ENCOUNTER — DOCUMENTATION (OUTPATIENT)
Dept: HEALTH INFORMATION MANAGEMENT | Facility: OTHER | Age: 87
End: 2022-09-15
Payer: MEDICARE

## 2022-09-15 NOTE — TELEPHONE ENCOUNTER
Colorectal Care Gap Outreach    1. Confirmed patient is between the ages of 50-75: NO     2. Confirmed that patient IS overdue or due soon for colorectal cancer screening: YES     3. Were orders placed within the last 12 months to complete screening: NO     Phone Number Called: [phone]  Call outcome: left message for patient to call back regarding message below: AWV and colorectal screening?     _____________________________________________________________________    Colon Cancer Screening Guidelines:     Important: If patient has any history of colon polyps or family history of colorectal cancer, FIT and Cologuard are NOT appropriate options. A colonoscopy is the recommended test for this set of patients.    Colonoscopy  Always recommend colonoscopy first.   A colonoscopy is recommended over the other tests because it provides direct visualization of the colon and if there are small polyps these can also be removed with one procedure.  If negative and no family history, could be cleared for 10 years.     Cologuard/FIT  Cologuard is completed once every 3 years.  FIT is completed annually.  If positive, Cologuard and FIT will require a diagnostic colonoscopy. Screening colonoscopies are classically covered by insurances, however, diagnostic colonoscopies may result in a bill.

## 2022-09-23 ENCOUNTER — NON-PROVIDER VISIT (OUTPATIENT)
Dept: CARDIOLOGY | Facility: MEDICAL CENTER | Age: 87
End: 2022-09-23
Payer: MEDICARE

## 2022-09-23 PROCEDURE — 93294 REM INTERROG EVL PM/LDLS PM: CPT | Performed by: INTERNAL MEDICINE

## 2022-09-23 NOTE — CARDIAC REMOTE MONITOR - SCAN
Device transmission reviewed. Device demonstrated appropriate function.       Electronically Signed by: Mohsen Fulton M.D.    9/23/2022  6:46 PM

## 2022-10-18 ENCOUNTER — HOSPITAL ENCOUNTER (OUTPATIENT)
Dept: LAB | Facility: MEDICAL CENTER | Age: 87
End: 2022-10-18
Attending: PHYSICIAN ASSISTANT
Payer: MEDICARE

## 2022-10-18 LAB
ALBUMIN SERPL BCP-MCNC: 4.2 G/DL (ref 3.2–4.9)
ALBUMIN/GLOB SERPL: 1.8 G/DL
ALP SERPL-CCNC: 91 U/L (ref 30–99)
ALT SERPL-CCNC: 12 U/L (ref 2–50)
ANION GAP SERPL CALC-SCNC: 9 MMOL/L (ref 7–16)
AST SERPL-CCNC: 18 U/L (ref 12–45)
BILIRUB SERPL-MCNC: 0.5 MG/DL (ref 0.1–1.5)
BUN SERPL-MCNC: 23 MG/DL (ref 8–22)
CALCIUM SERPL-MCNC: 9.3 MG/DL (ref 8.4–10.2)
CHLORIDE SERPL-SCNC: 106 MMOL/L (ref 96–112)
CO2 SERPL-SCNC: 25 MMOL/L (ref 20–33)
CREAT SERPL-MCNC: 0.98 MG/DL (ref 0.5–1.4)
GFR SERPLBLD CREATININE-BSD FMLA CKD-EPI: 75 ML/MIN/1.73 M 2
GLOBULIN SER CALC-MCNC: 2.3 G/DL (ref 1.9–3.5)
GLUCOSE SERPL-MCNC: 101 MG/DL (ref 65–99)
POTASSIUM SERPL-SCNC: 4.5 MMOL/L (ref 3.6–5.5)
PROT SERPL-MCNC: 6.5 G/DL (ref 6–8.2)
PSA SERPL-MCNC: 0.02 NG/ML (ref 0–4)
SODIUM SERPL-SCNC: 140 MMOL/L (ref 135–145)
TESTOST SERPL-MCNC: <12 NG/DL (ref 175–781)

## 2022-10-18 PROCEDURE — 84403 ASSAY OF TOTAL TESTOSTERONE: CPT

## 2022-10-18 PROCEDURE — 80053 COMPREHEN METABOLIC PANEL: CPT

## 2022-10-18 PROCEDURE — 84153 ASSAY OF PSA TOTAL: CPT

## 2022-10-18 PROCEDURE — 36415 COLL VENOUS BLD VENIPUNCTURE: CPT

## 2022-10-24 ENCOUNTER — HOSPITAL ENCOUNTER (OUTPATIENT)
Facility: MEDICAL CENTER | Age: 87
End: 2022-10-24
Attending: OTOLARYNGOLOGY
Payer: MEDICARE

## 2022-10-24 PROCEDURE — 87075 CULTR BACTERIA EXCEPT BLOOD: CPT

## 2022-10-24 PROCEDURE — 87205 SMEAR GRAM STAIN: CPT

## 2022-10-24 PROCEDURE — 87077 CULTURE AEROBIC IDENTIFY: CPT

## 2022-10-24 PROCEDURE — 87181 SC STD AGAR DILUTION PER AGT: CPT

## 2022-10-24 PROCEDURE — 87070 CULTURE OTHR SPECIMN AEROBIC: CPT

## 2022-10-25 LAB
GRAM STN SPEC: NORMAL
SIGNIFICANT IND 70042: NORMAL
SITE SITE: NORMAL
SOURCE SOURCE: NORMAL

## 2022-10-29 LAB
BACTERIA SPEC ANAEROBE CULT: NORMAL
BACTERIA WND AEROBE CULT: ABNORMAL
ETEST SENSITIVITY ETEST: NORMAL
GRAM STN SPEC: ABNORMAL
SIGNIFICANT IND 70042: ABNORMAL
SIGNIFICANT IND 70042: NORMAL
SITE SITE: ABNORMAL
SITE SITE: NORMAL
SOURCE SOURCE: ABNORMAL
SOURCE SOURCE: NORMAL

## 2022-12-14 ENCOUNTER — TELEPHONE (OUTPATIENT)
Dept: MEDICAL GROUP | Facility: LAB | Age: 87
End: 2022-12-14
Payer: MEDICARE

## 2022-12-14 RX ORDER — ALPRAZOLAM 1 MG/1
TABLET ORAL
COMMUNITY
End: 2022-12-15

## 2022-12-14 NOTE — TELEPHONE ENCOUNTER
Left message for patient to call back regarding pre-visit planning. Please transfer call to [phone].

## 2022-12-14 NOTE — TELEPHONE ENCOUNTER
ANNUAL WELLNESS VISIT PRE-VISIT PLANNING    1.  Reviewed notes from the last office visit: Yes    2.  If any orders were ordered or intended to be done prior to visit (i.e. 6 mos follow-up), do we have results/consult notes or has patient scheduled?          Labs - Labs were not ordered at last office visit.  Note: If patient appointment is for lab review and patient did not complete labs, check with provider if OK to reschedule patient until labs completed.         Imaging - Imaging was not ordered at last office visit.         Referrals - No referrals were ordered at last office visit.    3.  Immunizations were updated in Epic using Reconcile Outside Information activity? Yes    4.  Patient is due for the following Health Maintenance Topics:   Health Maintenance Due   Topic Date Due    Annual Wellness Visit  Never done    IMM HEP B VACCINE (1 of 3 - 3-dose series) Never done    IMM ZOSTER VACCINES (1 of 2) Never done    IMM PNEUMOCOCCAL VACCINE: 65+ Years (1 - PCV) Never done   5.  Reviewed/Updated the following with patient:          Preferred Pharmacy? Yes          Preferred Lab? Yes           Preferred Communication? Yes           Allergies? Yes           Medications? Yes, abstract    6.  Care Team Updated:          DME Company (gait device, O2, CPAP, etc.): No          Other Specialists (eye doctor, derm, GYN, cardiology, endo, etc): Yes     7.  Patient was advised: “This is a free wellness visit. The provider will screen for medical conditions to help you stay healthy. If you have other concerns to address you may be asked to discuss these at a separate visit or there may be an additional fee.”     8.  AHA (Puls8) form printed for Provider? N/A

## 2022-12-15 DIAGNOSIS — E03.9 HYPOTHYROIDISM, UNSPECIFIED TYPE: ICD-10-CM

## 2022-12-15 RX ORDER — ESCITALOPRAM OXALATE 10 MG/1
TABLET ORAL
COMMUNITY
End: 2022-12-15

## 2022-12-15 RX ORDER — RELUGOLIX 120 MG/1
TABLET, FILM COATED ORAL
COMMUNITY
End: 2022-12-15

## 2022-12-15 RX ORDER — CLINDAMYCIN HYDROCHLORIDE 300 MG/1
CAPSULE ORAL
COMMUNITY
Start: 2022-11-07 | End: 2022-12-15

## 2022-12-15 RX ORDER — PSYLLIUM HUSK 0.4 G
CAPSULE ORAL
COMMUNITY
End: 2023-06-10

## 2022-12-15 NOTE — TELEPHONE ENCOUNTER
Received request via: Patient    Was the patient seen in the last year in this department? No  LOV 03/23/2021    Next appointment scheduled for 12/22/2022  Does the patient have an active prescription (recently filled or refills available) for medication(s) requested? No    Does the patient have assisted Plus and need 100 day supply (blood pressure, diabetes and cholesterol meds only)? Yes, quantity updated to 100 days

## 2022-12-16 RX ORDER — LEVOTHYROXINE SODIUM 88 UG/1
88 TABLET ORAL
Qty: 100 TABLET | Refills: 0 | Status: SHIPPED | OUTPATIENT
Start: 2022-12-16 | End: 2023-03-24

## 2022-12-22 ENCOUNTER — OFFICE VISIT (OUTPATIENT)
Dept: MEDICAL GROUP | Facility: LAB | Age: 87
End: 2022-12-22
Payer: MEDICARE

## 2022-12-22 VITALS
HEART RATE: 66 BPM | WEIGHT: 171.2 LBS | SYSTOLIC BLOOD PRESSURE: 126 MMHG | DIASTOLIC BLOOD PRESSURE: 76 MMHG | RESPIRATION RATE: 12 BRPM | BODY MASS INDEX: 25.94 KG/M2 | OXYGEN SATURATION: 96 % | HEIGHT: 68 IN | TEMPERATURE: 97.2 F

## 2022-12-22 DIAGNOSIS — I49.5 SICK SINUS SYNDROME (HCC): ICD-10-CM

## 2022-12-22 DIAGNOSIS — C79.51 SECONDARY ADENOCARCINOMA OF BONE (HCC): ICD-10-CM

## 2022-12-22 DIAGNOSIS — C61 PROSTATE CA (HCC): ICD-10-CM

## 2022-12-22 DIAGNOSIS — Z23 NEED FOR VACCINATION: ICD-10-CM

## 2022-12-22 DIAGNOSIS — I71.21 ANEURYSM OF ASCENDING AORTA WITHOUT RUPTURE (HCC): ICD-10-CM

## 2022-12-22 DIAGNOSIS — E03.9 HYPOTHYROIDISM, UNSPECIFIED TYPE: ICD-10-CM

## 2022-12-22 DIAGNOSIS — F43.21 ADJUSTMENT DISORDER WITH DEPRESSED MOOD: ICD-10-CM

## 2022-12-22 PROCEDURE — 99214 OFFICE O/P EST MOD 30 MIN: CPT | Mod: 25 | Performed by: FAMILY MEDICINE

## 2022-12-22 PROCEDURE — G0009 ADMIN PNEUMOCOCCAL VACCINE: HCPCS | Performed by: FAMILY MEDICINE

## 2022-12-22 PROCEDURE — 90677 PCV20 VACCINE IM: CPT | Performed by: FAMILY MEDICINE

## 2022-12-22 RX ORDER — ESCITALOPRAM OXALATE 10 MG/1
10 TABLET ORAL
Qty: 90 TABLET | Refills: 3 | Status: SHIPPED | OUTPATIENT
Start: 2022-12-22 | End: 2023-06-22 | Stop reason: SDUPTHER

## 2022-12-22 RX ORDER — CLINDAMYCIN HYDROCHLORIDE 300 MG/1
CAPSULE ORAL
COMMUNITY
End: 2022-12-22

## 2022-12-22 RX ORDER — ESCITALOPRAM OXALATE 10 MG/1
1 TABLET ORAL
COMMUNITY
End: 2023-06-10

## 2022-12-22 ASSESSMENT — FIBROSIS 4 INDEX: FIB4 SCORE: 1.88

## 2022-12-22 NOTE — PROGRESS NOTES
"Subjective:     CC: Follow-up chronic conditions.    HPI:   David presents today to follow-up on chronic conditions.  Continues to follow closely with urology for prostate cancer, on orgovyx and Erleada with  Dr Bragg.    Had pacemaker placed for sick sinus syndrome and has done well.  Needs refill of Lexapro for depression with adjustment disorder.    History of 4.1 cm ascending aortic aneurysm on CT scan at [facility] in the past.  Overdue for repeat scan.    Medications, past medical history, allergies, and social history have been reviewed and updated.      Objective:       Exam:  /76 (BP Location: Left arm, Patient Position: Sitting, BP Cuff Size: Adult)   Pulse 66   Temp 36.2 °C (97.2 °F)   Resp 12   Ht 1.727 m (5' 8\")   Wt 77.7 kg (171 lb 3.2 oz)   SpO2 96%   BMI 26.03 kg/m²  Body mass index is 26.03 kg/m².    Constitutional: Alert. Well appearing. No distress.  Skin: Warm, dry, good turgor, no visible rashes.  Eye: Equal, round and reactive to light, conjunctiva clear, lids normal.  ENMT: Moist mucous membranes. Normal dentition.  Respiratory: Normal effort. Lungs are clear to auscultation bilaterally.  Cardiovascular: Regular rate and rhythm. Normal S1/S2. No murmurs, rubs or gallops.   Neuro: Moves all four extremities. No facial droop.  Psych: Answers questions appropriately. Normal affect and mood.      Assessment & Plan:     87 y.o. male with the following -     1. Aneurysm of ascending aorta without rupture  History of 4.1 cm ascending aortic aneurysm on CT scan at [facility] in the past.  Overdue for repeat scan.  - CT-CTA CHEST WITH & W/O-POST PROCESS; Future    2. Prostate CA (HCC)  3. Secondary adenocarcinoma of bone (HCC)  Continues to follow closely with urology for prostate cancer, on orgovyx and Erleada with  Dr Bragg.  - CBC WITH DIFFERENTIAL; Future    4. Sick sinus syndrome (HCC)  Post pacemaker, doing well.    5. Adjustment disorder with depressed " mood  Continue Lexapro  - escitalopram (LEXAPRO) 10 MG Tab; Take 1 Tablet by mouth every day.  Dispense: 90 Tablet; Refill: 3    6. Need for vaccination  - Pneumococcal Conjugate Vaccine 20-Valent (19 yrs+)    7. Hypothyroidism, unspecified type  Recheck TSH, continue Synthroid.  - TSH WITH REFLEX TO FT4; Future       Please note that this note was created using voice recognition software.

## 2022-12-23 ENCOUNTER — NON-PROVIDER VISIT (OUTPATIENT)
Dept: CARDIOLOGY | Facility: MEDICAL CENTER | Age: 87
End: 2022-12-23
Payer: MEDICARE

## 2022-12-23 PROCEDURE — 93294 REM INTERROG EVL PM/LDLS PM: CPT | Performed by: INTERNAL MEDICINE

## 2022-12-27 NOTE — CARDIAC REMOTE MONITOR - SCAN
Device transmission reviewed. Device demonstrated appropriate function.       Electronically Signed by: Brandon Rubin M.D.    1/3/2023  12:32 PM

## 2023-01-12 DIAGNOSIS — I71.21 ANEURYSM OF ASCENDING AORTA WITHOUT RUPTURE (HCC): Primary | ICD-10-CM

## 2023-01-26 ENCOUNTER — HOSPITAL ENCOUNTER (OUTPATIENT)
Dept: LAB | Facility: MEDICAL CENTER | Age: 88
End: 2023-01-26
Attending: FAMILY MEDICINE
Payer: MEDICARE

## 2023-01-26 LAB
PSA SERPL-MCNC: 0.03 NG/ML (ref 0–4)
TESTOST SERPL-MCNC: <12 NG/DL (ref 175–781)

## 2023-01-26 PROCEDURE — 84153 ASSAY OF PSA TOTAL: CPT

## 2023-01-26 PROCEDURE — 36415 COLL VENOUS BLD VENIPUNCTURE: CPT

## 2023-01-26 PROCEDURE — 84403 ASSAY OF TOTAL TESTOSTERONE: CPT

## 2023-03-09 NOTE — TELEPHONE ENCOUNTER
Pt called and just wants to make sure [facility] is still okay to proceed with the pacemaker procedure scheduled for 9/24 with his aortic aneurysm. Please call pt back at [phone].     Thank you    .

## 2023-03-17 ENCOUNTER — OFFICE VISIT (OUTPATIENT)
Dept: URGENT CARE | Facility: CLINIC | Age: 88
End: 2023-03-17
Payer: MEDICARE

## 2023-03-17 VITALS
RESPIRATION RATE: 18 BRPM | SYSTOLIC BLOOD PRESSURE: 102 MMHG | BODY MASS INDEX: 25.01 KG/M2 | TEMPERATURE: 98.6 F | HEIGHT: 68 IN | WEIGHT: 165 LBS | OXYGEN SATURATION: 97 % | HEART RATE: 86 BPM | DIASTOLIC BLOOD PRESSURE: 78 MMHG

## 2023-03-17 DIAGNOSIS — J06.9 VIRAL URI WITH COUGH: ICD-10-CM

## 2023-03-17 DIAGNOSIS — R05.1 ACUTE COUGH: ICD-10-CM

## 2023-03-17 LAB
FLUAV RNA SPEC QL NAA+PROBE: NEGATIVE
FLUBV RNA SPEC QL NAA+PROBE: NEGATIVE
RSV RNA SPEC QL NAA+PROBE: NEGATIVE
SARS-COV-2 RNA RESP QL NAA+PROBE: NEGATIVE

## 2023-03-17 PROCEDURE — 99213 OFFICE O/P EST LOW 20 MIN: CPT | Performed by: NURSE PRACTITIONER

## 2023-03-17 PROCEDURE — 0241U POCT CEPHEID COV-2, FLU A/B, RSV - PCR: CPT | Performed by: NURSE PRACTITIONER

## 2023-03-17 RX ORDER — PREDNISONE 20 MG/1
TABLET ORAL
Qty: 8 TABLET | Refills: 0 | Status: SHIPPED | OUTPATIENT
Start: 2023-03-17 | End: 2023-03-17 | Stop reason: SDUPTHER

## 2023-03-17 RX ORDER — PREDNISONE 20 MG/1
TABLET ORAL
Qty: 8 TABLET | Refills: 0 | Status: SHIPPED | OUTPATIENT
Start: 2023-03-17 | End: 2023-06-10

## 2023-03-17 RX ORDER — BENZONATATE 100 MG/1
100 CAPSULE ORAL 3 TIMES DAILY PRN
Qty: 60 CAPSULE | Refills: 0 | Status: SHIPPED | OUTPATIENT
Start: 2023-03-17 | End: 2023-03-17 | Stop reason: SDUPTHER

## 2023-03-17 RX ORDER — BENZONATATE 100 MG/1
100 CAPSULE ORAL 3 TIMES DAILY PRN
Qty: 60 CAPSULE | Refills: 0 | Status: SHIPPED | OUTPATIENT
Start: 2023-03-17 | End: 2023-06-10

## 2023-03-17 ASSESSMENT — ENCOUNTER SYMPTOMS
SHORTNESS OF BREATH: 0
WHEEZING: 0
COUGH: 1

## 2023-03-17 ASSESSMENT — FIBROSIS 4 INDEX: FIB4 SCORE: 1.88

## 2023-03-17 ASSESSMENT — COPD QUESTIONNAIRES: COPD: 0

## 2023-03-17 NOTE — PROGRESS NOTES
Subjective     David Darling is a 87 y.o. male who presents with Cough (Last 24 hours, chest congested, deep cough, concerns about possible bronchitis )            Cough  This is a new problem. Episode onset: pt reports new onset of cough that started yesterday. he reports the cough is productive at times. no fever or chills. reports fatigue. no hx of asthma or smoking. The cough is Productive of sputum. Pertinent negatives include no shortness of breath or wheezing. Associated symptoms comments: Endorses that his chest feels tight. He has tried OTC cough suppressant (mucinex) for the symptoms. The treatment provided no relief. There is no history of asthma or COPD.     Review of Systems   Constitutional:  Positive for malaise/fatigue.   Respiratory:  Positive for cough. Negative for shortness of breath and wheezing.    All other systems reviewed and are negative.       Past Medical History:   Diagnosis Date    Allergy     Anemia     Arrhythmia     Atrial Fib    Bronchitis 2019    Cancer ([facility]) 2010    Prostate    COPD (chronic obstructive pulmonary disease) ([facility])     Macular degeneration     Muscle disorder     Psychiatric problem     depression and anxiety    Sinusitis, chronic     Strep throat       Past Surgical History:   Procedure Laterality Date    KNEE ARTHROSCOPY        Social History     Socioeconomic History    Marital status: Single     Spouse name: Not on file    Number of children: Not on file    Years of education: Not on file    Highest education level: Not on file   Occupational History    Not on file   Tobacco Use    Smoking status: Never    Smokeless tobacco: Never   Vaping Use    Vaping Use: Never used   Substance and Sexual Activity    Alcohol use: No    Drug use: Not Currently     Types: Oral     Comment: marijuan tincture  for sleep nightly    Sexual activity: Never   Other Topics Concern    Not on file   Social History Narrative    Not on file     Social Determinants of Health     Financial  "Resource Strain: Not on file   Food Insecurity: Not on file   Transportation Needs: Not on file   Physical Activity: Not on file   Stress: Not on file   Social Connections: Not on file   Intimate Partner Violence: Not on file   Housing Stability: Not on file         Objective     /78   Pulse 86   Temp 37 °C (98.6 °F) (Temporal)   Resp 18   Ht 1.727 m (5' 8\")   Wt 74.8 kg (165 lb)   SpO2 97%   BMI 25.09 kg/m²      Physical Exam  Vitals and nursing note reviewed.   Constitutional:       Appearance: Normal appearance.   HENT:      Head: Normocephalic and atraumatic.      Nose: Nose normal.      Mouth/Throat:      Mouth: Mucous membranes are moist.      Pharynx: Oropharynx is clear.   Eyes:      Extraocular Movements: Extraocular movements intact.      Pupils: Pupils are equal, round, and reactive to light.   Cardiovascular:      Rate and Rhythm: Normal rate and regular rhythm.   Pulmonary:      Effort: Pulmonary effort is normal.      Breath sounds: Normal breath sounds.   Musculoskeletal:         General: Normal range of motion.      Cervical back: Normal range of motion and neck supple.   Skin:     General: Skin is warm and dry.      Capillary Refill: Capillary refill takes less than 2 seconds.   Neurological:      General: No focal deficit present.      Mental Status: He is alert and oriented to person, place, and time. Mental status is at baseline.   Psychiatric:         Mood and Affect: Mood normal.         Thought Content: Thought content normal.         Judgment: Judgment normal.                           Assessment & Plan        1. Acute cough  - POCT CoV-2, Flu A/B, RSV by PCR  - benzonatate (TESSALON) 100 MG Cap; Take 1 Capsule by mouth 3 times a day as needed for Cough.  Dispense: 60 Capsule; Refill: 0  - predniSONE (DELTASONE) 20 MG Tab; Take 2 tabs daily for 4 days  Dispense: 8 Tablet; Refill: 0    2. Viral URI with cough       Viral panel- negative  Discussed with patient symptoms are viral " in nature, there is low concern for any respiratory infection currently. Antibiotics are not advised at this time.  Encouraged rest and fluids  Supportive care, differential diagnoses, and indications for immediate follow-up discussed with patient.    Pathogenesis of diagnosis discussed including typical length and natural progression.    Instructed to return to  or nearest emergency department if symptoms fail to improve, for any change in condition, further concerns, or new concerning symptoms.  Patient states understanding of the plan of care and discharge instructions.

## 2023-03-24 ENCOUNTER — NON-PROVIDER VISIT (OUTPATIENT)
Dept: CARDIOLOGY | Facility: MEDICAL CENTER | Age: 88
End: 2023-03-24
Payer: MEDICARE

## 2023-03-24 DIAGNOSIS — E03.9 HYPOTHYROIDISM, UNSPECIFIED TYPE: ICD-10-CM

## 2023-03-24 PROCEDURE — 93294 REM INTERROG EVL PM/LDLS PM: CPT | Performed by: INTERNAL MEDICINE

## 2023-03-24 RX ORDER — LEVOTHYROXINE SODIUM 88 UG/1
TABLET ORAL
Qty: 100 TABLET | Refills: 2 | Status: SHIPPED | OUTPATIENT
Start: 2023-03-24 | End: 2023-06-10

## 2023-03-24 NOTE — CARDIAC REMOTE MONITOR - SCAN
Device transmission reviewed. Device demonstrated appropriate function.       Electronically Signed by: Titi Rosas M.D.    3/30/2023  8:22 PM

## 2023-03-24 NOTE — TELEPHONE ENCOUNTER
Received request via: Pharmacy    Was the patient seen in the last year in this department? Yes  12/22/22  Does the patient have an active prescription (recently filled or refills available) for medication(s) requested? No    Does the patient have USP Plus and need 100 day supply (blood pressure, diabetes and cholesterol meds only)? Yes, quantity updated to 100 days

## 2023-05-05 ENCOUNTER — HOSPITAL ENCOUNTER (OUTPATIENT)
Dept: LAB | Facility: MEDICAL CENTER | Age: 88
End: 2023-05-05
Attending: PHYSICIAN ASSISTANT
Payer: MEDICARE

## 2023-05-05 ENCOUNTER — HOSPITAL ENCOUNTER (OUTPATIENT)
Dept: LAB | Facility: MEDICAL CENTER | Age: 88
End: 2023-05-05
Attending: FAMILY MEDICINE
Payer: MEDICARE

## 2023-05-05 DIAGNOSIS — C61 PROSTATE CA (HCC): ICD-10-CM

## 2023-05-05 DIAGNOSIS — E03.9 HYPOTHYROIDISM, UNSPECIFIED TYPE: ICD-10-CM

## 2023-05-05 DIAGNOSIS — I71.21 ANEURYSM OF ASCENDING AORTA WITHOUT RUPTURE (HCC): ICD-10-CM

## 2023-05-05 DIAGNOSIS — C79.51 SECONDARY ADENOCARCINOMA OF BONE (HCC): ICD-10-CM

## 2023-05-05 LAB
ALBUMIN SERPL BCP-MCNC: 4.2 G/DL (ref 3.2–4.9)
ALBUMIN/GLOB SERPL: 1.7 G/DL
ALP SERPL-CCNC: 81 U/L (ref 30–99)
ALT SERPL-CCNC: 11 U/L (ref 2–50)
ANION GAP SERPL CALC-SCNC: 8 MMOL/L (ref 7–16)
ANION GAP SERPL CALC-SCNC: 9 MMOL/L (ref 7–16)
AST SERPL-CCNC: 17 U/L (ref 12–45)
BASOPHILS # BLD AUTO: 0.9 % (ref 0–1.8)
BASOPHILS # BLD: 0.06 K/UL (ref 0–0.12)
BILIRUB SERPL-MCNC: 0.3 MG/DL (ref 0.1–1.5)
BUN SERPL-MCNC: 25 MG/DL (ref 8–22)
BUN SERPL-MCNC: 25 MG/DL (ref 8–22)
CALCIUM ALBUM COR SERPL-MCNC: 9.2 MG/DL (ref 8.5–10.5)
CALCIUM SERPL-MCNC: 9 MG/DL (ref 8.4–10.2)
CALCIUM SERPL-MCNC: 9.4 MG/DL (ref 8.4–10.2)
CHLORIDE SERPL-SCNC: 106 MMOL/L (ref 96–112)
CHLORIDE SERPL-SCNC: 108 MMOL/L (ref 96–112)
CO2 SERPL-SCNC: 25 MMOL/L (ref 20–33)
CO2 SERPL-SCNC: 25 MMOL/L (ref 20–33)
CREAT SERPL-MCNC: 0.82 MG/DL (ref 0.5–1.4)
CREAT SERPL-MCNC: 0.83 MG/DL (ref 0.5–1.4)
EOSINOPHIL # BLD AUTO: 0.17 K/UL (ref 0–0.51)
EOSINOPHIL NFR BLD: 2.6 % (ref 0–6.9)
ERYTHROCYTE [DISTWIDTH] IN BLOOD BY AUTOMATED COUNT: 46.4 FL (ref 35.9–50)
GFR SERPLBLD CREATININE-BSD FMLA CKD-EPI: 85 ML/MIN/1.73 M 2
GFR SERPLBLD CREATININE-BSD FMLA CKD-EPI: 85 ML/MIN/1.73 M 2
GLOBULIN SER CALC-MCNC: 2.5 G/DL (ref 1.9–3.5)
GLUCOSE SERPL-MCNC: 113 MG/DL (ref 65–99)
GLUCOSE SERPL-MCNC: 114 MG/DL (ref 65–99)
HCT VFR BLD AUTO: 35.8 % (ref 42–52)
HGB BLD-MCNC: 12.1 G/DL (ref 14–18)
IMM GRANULOCYTES # BLD AUTO: 0.02 K/UL (ref 0–0.11)
IMM GRANULOCYTES NFR BLD AUTO: 0.3 % (ref 0–0.9)
LYMPHOCYTES # BLD AUTO: 1.15 K/UL (ref 1–4.8)
LYMPHOCYTES NFR BLD: 17.9 % (ref 22–41)
MCH RBC QN AUTO: 31.1 PG (ref 27–33)
MCHC RBC AUTO-ENTMCNC: 33.8 G/DL (ref 33.7–35.3)
MCV RBC AUTO: 92 FL (ref 81.4–97.8)
MONOCYTES # BLD AUTO: 0.62 K/UL (ref 0–0.85)
MONOCYTES NFR BLD AUTO: 9.6 % (ref 0–13.4)
NEUTROPHILS # BLD AUTO: 4.42 K/UL (ref 1.82–7.42)
NEUTROPHILS NFR BLD: 68.7 % (ref 44–72)
NRBC # BLD AUTO: 0 K/UL
NRBC BLD-RTO: 0 /100 WBC
PLATELET # BLD AUTO: 299 K/UL (ref 164–446)
PMV BLD AUTO: 11.9 FL (ref 9–12.9)
POTASSIUM SERPL-SCNC: 4.2 MMOL/L (ref 3.6–5.5)
POTASSIUM SERPL-SCNC: 4.2 MMOL/L (ref 3.6–5.5)
PROT SERPL-MCNC: 6.7 G/DL (ref 6–8.2)
RBC # BLD AUTO: 3.89 M/UL (ref 4.7–6.1)
SODIUM SERPL-SCNC: 140 MMOL/L (ref 135–145)
SODIUM SERPL-SCNC: 141 MMOL/L (ref 135–145)
TSH SERPL DL<=0.005 MIU/L-ACNC: 2.74 UIU/ML (ref 0.38–5.33)
WBC # BLD AUTO: 6.4 K/UL (ref 4.8–10.8)

## 2023-05-05 PROCEDURE — 84403 ASSAY OF TOTAL TESTOSTERONE: CPT

## 2023-05-05 PROCEDURE — 80053 COMPREHEN METABOLIC PANEL: CPT

## 2023-05-05 PROCEDURE — 84443 ASSAY THYROID STIM HORMONE: CPT

## 2023-05-05 PROCEDURE — 84153 ASSAY OF PSA TOTAL: CPT

## 2023-05-05 PROCEDURE — 80048 BASIC METABOLIC PNL TOTAL CA: CPT

## 2023-05-05 PROCEDURE — 36415 COLL VENOUS BLD VENIPUNCTURE: CPT

## 2023-05-05 PROCEDURE — 85025 COMPLETE CBC W/AUTO DIFF WBC: CPT

## 2023-05-06 LAB
PSA SERPL-MCNC: 0.07 NG/ML (ref 0–4)
TESTOST SERPL-MCNC: <12 NG/DL (ref 175–781)

## 2023-06-10 ENCOUNTER — ANESTHESIA EVENT (OUTPATIENT)
Dept: SURGERY | Facility: MEDICAL CENTER | Age: 88
DRG: 329 | End: 2023-06-10
Payer: MEDICARE

## 2023-06-10 ENCOUNTER — ANESTHESIA (OUTPATIENT)
Dept: SURGERY | Facility: MEDICAL CENTER | Age: 88
DRG: 329 | End: 2023-06-10
Payer: MEDICARE

## 2023-06-10 ENCOUNTER — HOSPITAL ENCOUNTER (INPATIENT)
Facility: MEDICAL CENTER | Age: 88
LOS: 3 days | DRG: 329 | End: 2023-06-13
Attending: EMERGENCY MEDICINE | Admitting: HOSPITALIST
Payer: MEDICARE

## 2023-06-10 ENCOUNTER — APPOINTMENT (OUTPATIENT)
Dept: RADIOLOGY | Facility: MEDICAL CENTER | Age: 88
DRG: 329 | End: 2023-06-10
Attending: EMERGENCY MEDICINE
Payer: MEDICARE

## 2023-06-10 DIAGNOSIS — K42.0 INCARCERATED UMBILICAL HERNIA: ICD-10-CM

## 2023-06-10 DIAGNOSIS — K56.609 SMALL BOWEL OBSTRUCTION (HCC): ICD-10-CM

## 2023-06-10 PROBLEM — R91.1 PULMONARY NODULE: Status: ACTIVE | Noted: 2023-06-10

## 2023-06-10 PROBLEM — N40.0 PROSTATIC HYPERPLASIA: Status: ACTIVE | Noted: 2023-06-10

## 2023-06-10 LAB
ALBUMIN SERPL BCP-MCNC: 4.3 G/DL (ref 3.2–4.9)
ALBUMIN/GLOB SERPL: 1.7 G/DL
ALP SERPL-CCNC: 85 U/L (ref 30–99)
ALT SERPL-CCNC: 14 U/L (ref 2–50)
ANION GAP SERPL CALC-SCNC: 14 MMOL/L (ref 7–16)
AST SERPL-CCNC: 28 U/L (ref 12–45)
BASOPHILS # BLD AUTO: 0.3 % (ref 0–1.8)
BASOPHILS # BLD: 0.04 K/UL (ref 0–0.12)
BILIRUB SERPL-MCNC: 0.9 MG/DL (ref 0.1–1.5)
BUN SERPL-MCNC: 19 MG/DL (ref 8–22)
CALCIUM ALBUM COR SERPL-MCNC: 9.6 MG/DL (ref 8.5–10.5)
CALCIUM SERPL-MCNC: 9.8 MG/DL (ref 8.4–10.2)
CHLORIDE SERPL-SCNC: 103 MMOL/L (ref 96–112)
CO2 SERPL-SCNC: 18 MMOL/L (ref 20–33)
CREAT SERPL-MCNC: 0.92 MG/DL (ref 0.5–1.4)
EKG IMPRESSION: NORMAL
EOSINOPHIL # BLD AUTO: 0.02 K/UL (ref 0–0.51)
EOSINOPHIL NFR BLD: 0.2 % (ref 0–6.9)
ERYTHROCYTE [DISTWIDTH] IN BLOOD BY AUTOMATED COUNT: 45.9 FL (ref 35.9–50)
GFR SERPLBLD CREATININE-BSD FMLA CKD-EPI: 81 ML/MIN/1.73 M 2
GLOBULIN SER CALC-MCNC: 2.6 G/DL (ref 1.9–3.5)
GLUCOSE SERPL-MCNC: 126 MG/DL (ref 65–99)
HCT VFR BLD AUTO: 42.3 % (ref 42–52)
HGB BLD-MCNC: 14.1 G/DL (ref 14–18)
IMM GRANULOCYTES # BLD AUTO: 0.04 K/UL (ref 0–0.11)
IMM GRANULOCYTES NFR BLD AUTO: 0.3 % (ref 0–0.9)
INR PPP: 1.17 (ref 0.87–1.13)
LIPASE SERPL-CCNC: 32 U/L (ref 7–58)
LYMPHOCYTES # BLD AUTO: 0.75 K/UL (ref 1–4.8)
LYMPHOCYTES NFR BLD: 6.1 % (ref 22–41)
MCH RBC QN AUTO: 30.8 PG (ref 27–33)
MCHC RBC AUTO-ENTMCNC: 33.3 G/DL (ref 32.3–36.5)
MCV RBC AUTO: 92.4 FL (ref 81.4–97.8)
MONOCYTES # BLD AUTO: 0.74 K/UL (ref 0–0.85)
MONOCYTES NFR BLD AUTO: 6 % (ref 0–13.4)
NEUTROPHILS # BLD AUTO: 10.8 K/UL (ref 1.82–7.42)
NEUTROPHILS NFR BLD: 87.1 % (ref 44–72)
NRBC # BLD AUTO: 0 K/UL
NRBC BLD-RTO: 0 /100 WBC (ref 0–0.2)
PATHOLOGY CONSULT NOTE: NORMAL
PLATELET # BLD AUTO: 229 K/UL (ref 164–446)
PMV BLD AUTO: 12.9 FL (ref 9–12.9)
POTASSIUM SERPL-SCNC: 4.4 MMOL/L (ref 3.6–5.5)
PROT SERPL-MCNC: 6.9 G/DL (ref 6–8.2)
PROTHROMBIN TIME: 14.7 SEC (ref 12–14.6)
RBC # BLD AUTO: 4.58 M/UL (ref 4.7–6.1)
SODIUM SERPL-SCNC: 135 MMOL/L (ref 135–145)
WBC # BLD AUTO: 12.4 K/UL (ref 4.8–10.8)

## 2023-06-10 PROCEDURE — 700105 HCHG RX REV CODE 258: Performed by: EMERGENCY MEDICINE

## 2023-06-10 PROCEDURE — A9270 NON-COVERED ITEM OR SERVICE: HCPCS | Performed by: ANESTHESIOLOGY

## 2023-06-10 PROCEDURE — A9270 NON-COVERED ITEM OR SERVICE: HCPCS | Performed by: HOSPITALIST

## 2023-06-10 PROCEDURE — 85610 PROTHROMBIN TIME: CPT

## 2023-06-10 PROCEDURE — 96375 TX/PRO/DX INJ NEW DRUG ADDON: CPT

## 2023-06-10 PROCEDURE — 700101 HCHG RX REV CODE 250: Performed by: ANESTHESIOLOGY

## 2023-06-10 PROCEDURE — 160042 HCHG SURGERY MINUTES - EA ADDL 1 MIN LEVEL 5: Performed by: SURGERY

## 2023-06-10 PROCEDURE — 88307 TISSUE EXAM BY PATHOLOGIST: CPT

## 2023-06-10 PROCEDURE — 0WQF0ZZ REPAIR ABDOMINAL WALL, OPEN APPROACH: ICD-10-PCS | Performed by: SURGERY

## 2023-06-10 PROCEDURE — 74177 CT ABD & PELVIS W/CONTRAST: CPT

## 2023-06-10 PROCEDURE — 99100 ANES PT EXTEME AGE<1 YR&>70: CPT | Performed by: ANESTHESIOLOGY

## 2023-06-10 PROCEDURE — 36415 COLL VENOUS BLD VENIPUNCTURE: CPT

## 2023-06-10 PROCEDURE — 160048 HCHG OR STATISTICAL LEVEL 1-5: Performed by: SURGERY

## 2023-06-10 PROCEDURE — 83690 ASSAY OF LIPASE: CPT

## 2023-06-10 PROCEDURE — 700111 HCHG RX REV CODE 636 W/ 250 OVERRIDE (IP): Performed by: ANESTHESIOLOGY

## 2023-06-10 PROCEDURE — 700102 HCHG RX REV CODE 250 W/ 637 OVERRIDE(OP): Performed by: ANESTHESIOLOGY

## 2023-06-10 PROCEDURE — 700101 HCHG RX REV CODE 250: Performed by: EMERGENCY MEDICINE

## 2023-06-10 PROCEDURE — 80053 COMPREHEN METABOLIC PANEL: CPT

## 2023-06-10 PROCEDURE — 160035 HCHG PACU - 1ST 60 MINS PHASE I: Performed by: SURGERY

## 2023-06-10 PROCEDURE — 700102 HCHG RX REV CODE 250 W/ 637 OVERRIDE(OP): Performed by: HOSPITALIST

## 2023-06-10 PROCEDURE — 160002 HCHG RECOVERY MINUTES (STAT): Performed by: SURGERY

## 2023-06-10 PROCEDURE — 700105 HCHG RX REV CODE 258: Performed by: ANESTHESIOLOGY

## 2023-06-10 PROCEDURE — 99140 ANES COMP EMERGENCY COND: CPT | Performed by: ANESTHESIOLOGY

## 2023-06-10 PROCEDURE — 160009 HCHG ANES TIME/MIN: Performed by: SURGERY

## 2023-06-10 PROCEDURE — 99223 1ST HOSP IP/OBS HIGH 75: CPT | Mod: AI | Performed by: HOSPITALIST

## 2023-06-10 PROCEDURE — 700111 HCHG RX REV CODE 636 W/ 250 OVERRIDE (IP): Performed by: EMERGENCY MEDICINE

## 2023-06-10 PROCEDURE — 96365 THER/PROPH/DIAG IV INF INIT: CPT

## 2023-06-10 PROCEDURE — 94760 N-INVAS EAR/PLS OXIMETRY 1: CPT

## 2023-06-10 PROCEDURE — 700117 HCHG RX CONTRAST REV CODE 255: Performed by: EMERGENCY MEDICINE

## 2023-06-10 PROCEDURE — 770001 HCHG ROOM/CARE - MED/SURG/GYN PRIV*

## 2023-06-10 PROCEDURE — 700105 HCHG RX REV CODE 258: Performed by: HOSPITALIST

## 2023-06-10 PROCEDURE — 160031 HCHG SURGERY MINUTES - 1ST 30 MINS LEVEL 5: Performed by: SURGERY

## 2023-06-10 PROCEDURE — 99291 CRITICAL CARE FIRST HOUR: CPT

## 2023-06-10 PROCEDURE — 93005 ELECTROCARDIOGRAM TRACING: CPT | Performed by: EMERGENCY MEDICINE

## 2023-06-10 PROCEDURE — 00840 ANES IPER PX LOWER ABD NOS: CPT | Performed by: ANESTHESIOLOGY

## 2023-06-10 PROCEDURE — 0DB80ZZ EXCISION OF SMALL INTESTINE, OPEN APPROACH: ICD-10-PCS | Performed by: SURGERY

## 2023-06-10 PROCEDURE — 85025 COMPLETE CBC W/AUTO DIFF WBC: CPT

## 2023-06-10 RX ORDER — CALCIUM CARBONATE 500 MG/1
500 TABLET, CHEWABLE ORAL 3 TIMES DAILY PRN
Status: DISCONTINUED | OUTPATIENT
Start: 2023-06-10 | End: 2023-06-13 | Stop reason: HOSPADM

## 2023-06-10 RX ORDER — SUCCINYLCHOLINE CHLORIDE 20 MG/ML
INJECTION INTRAMUSCULAR; INTRAVENOUS PRN
Status: DISCONTINUED | OUTPATIENT
Start: 2023-06-10 | End: 2023-06-10 | Stop reason: SURG

## 2023-06-10 RX ORDER — HYDRALAZINE HYDROCHLORIDE 20 MG/ML
10 INJECTION INTRAMUSCULAR; INTRAVENOUS ONCE
Status: ACTIVE | OUTPATIENT
Start: 2023-06-10 | End: 2023-06-11

## 2023-06-10 RX ORDER — ONDANSETRON 2 MG/ML
4 INJECTION INTRAMUSCULAR; INTRAVENOUS ONCE
Status: COMPLETED | OUTPATIENT
Start: 2023-06-10 | End: 2023-06-10

## 2023-06-10 RX ORDER — ROCURONIUM BROMIDE 10 MG/ML
INJECTION, SOLUTION INTRAVENOUS PRN
Status: DISCONTINUED | OUTPATIENT
Start: 2023-06-10 | End: 2023-06-10 | Stop reason: SURG

## 2023-06-10 RX ORDER — ONDANSETRON 2 MG/ML
INJECTION INTRAMUSCULAR; INTRAVENOUS PRN
Status: DISCONTINUED | OUTPATIENT
Start: 2023-06-10 | End: 2023-06-10 | Stop reason: SURG

## 2023-06-10 RX ORDER — LIDOCAINE HYDROCHLORIDE 40 MG/ML
SOLUTION TOPICAL PRN
Status: DISCONTINUED | OUTPATIENT
Start: 2023-06-10 | End: 2023-06-10 | Stop reason: SURG

## 2023-06-10 RX ORDER — ESCITALOPRAM OXALATE 10 MG/1
10 TABLET ORAL DAILY
Status: DISCONTINUED | OUTPATIENT
Start: 2023-06-11 | End: 2023-06-13 | Stop reason: HOSPADM

## 2023-06-10 RX ORDER — SODIUM CHLORIDE 9 MG/ML
INJECTION, SOLUTION INTRAVENOUS
Status: DISCONTINUED | OUTPATIENT
Start: 2023-06-10 | End: 2023-06-10 | Stop reason: SURG

## 2023-06-10 RX ORDER — OXYCODONE HCL 5 MG/5 ML
10 SOLUTION, ORAL ORAL
Status: COMPLETED | OUTPATIENT
Start: 2023-06-10 | End: 2023-06-10

## 2023-06-10 RX ORDER — HYDROMORPHONE HYDROCHLORIDE 1 MG/ML
0.1 INJECTION, SOLUTION INTRAMUSCULAR; INTRAVENOUS; SUBCUTANEOUS
Status: DISCONTINUED | OUTPATIENT
Start: 2023-06-10 | End: 2023-06-10 | Stop reason: HOSPADM

## 2023-06-10 RX ORDER — METRONIDAZOLE 500 MG/100ML
500 INJECTION, SOLUTION INTRAVENOUS EVERY 12 HOURS
Status: DISCONTINUED | OUTPATIENT
Start: 2023-06-11 | End: 2023-06-12

## 2023-06-10 RX ORDER — SODIUM CHLORIDE, SODIUM LACTATE, POTASSIUM CHLORIDE, CALCIUM CHLORIDE 600; 310; 30; 20 MG/100ML; MG/100ML; MG/100ML; MG/100ML
INJECTION, SOLUTION INTRAVENOUS CONTINUOUS
Status: DISCONTINUED | OUTPATIENT
Start: 2023-06-10 | End: 2023-06-13 | Stop reason: HOSPADM

## 2023-06-10 RX ORDER — METRONIDAZOLE 500 MG/100ML
500 INJECTION, SOLUTION INTRAVENOUS ONCE
Status: COMPLETED | OUTPATIENT
Start: 2023-06-10 | End: 2023-06-10

## 2023-06-10 RX ORDER — HYDROMORPHONE HYDROCHLORIDE 1 MG/ML
0.4 INJECTION, SOLUTION INTRAMUSCULAR; INTRAVENOUS; SUBCUTANEOUS
Status: DISCONTINUED | OUTPATIENT
Start: 2023-06-10 | End: 2023-06-10 | Stop reason: HOSPADM

## 2023-06-10 RX ORDER — ANTIOX #8/OM3/DHA/EPA/LUT/ZEAX 250-2.5 MG
1 CAPSULE ORAL 2 TIMES DAILY
COMMUNITY

## 2023-06-10 RX ORDER — CEFTRIAXONE 1 G/1
1000 INJECTION, POWDER, FOR SOLUTION INTRAMUSCULAR; INTRAVENOUS ONCE
Status: COMPLETED | OUTPATIENT
Start: 2023-06-10 | End: 2023-06-10

## 2023-06-10 RX ORDER — AMOXICILLIN 250 MG
2 CAPSULE ORAL 2 TIMES DAILY
Status: DISCONTINUED | OUTPATIENT
Start: 2023-06-11 | End: 2023-06-13 | Stop reason: HOSPADM

## 2023-06-10 RX ORDER — ONDANSETRON 4 MG/1
4 TABLET, ORALLY DISINTEGRATING ORAL EVERY 4 HOURS PRN
Status: DISCONTINUED | OUTPATIENT
Start: 2023-06-10 | End: 2023-06-13 | Stop reason: HOSPADM

## 2023-06-10 RX ORDER — ONDANSETRON 2 MG/ML
4 INJECTION INTRAMUSCULAR; INTRAVENOUS
Status: DISCONTINUED | OUTPATIENT
Start: 2023-06-10 | End: 2023-06-10 | Stop reason: HOSPADM

## 2023-06-10 RX ORDER — APALUTAMIDE 60 MG/1
120 TABLET, FILM COATED ORAL DAILY
COMMUNITY
End: 2023-06-17

## 2023-06-10 RX ORDER — MORPHINE SULFATE 4 MG/ML
4 INJECTION INTRAVENOUS ONCE
Status: COMPLETED | OUTPATIENT
Start: 2023-06-10 | End: 2023-06-10

## 2023-06-10 RX ORDER — ALPRAZOLAM 1 MG/1
1 TABLET ORAL NIGHTLY PRN
COMMUNITY
End: 2023-06-17

## 2023-06-10 RX ORDER — OXYCODONE HCL 5 MG/5 ML
5 SOLUTION, ORAL ORAL
Status: COMPLETED | OUTPATIENT
Start: 2023-06-10 | End: 2023-06-10

## 2023-06-10 RX ORDER — CEFOTETAN DISODIUM 2 G/20ML
INJECTION, POWDER, FOR SOLUTION INTRAMUSCULAR; INTRAVENOUS PRN
Status: DISCONTINUED | OUTPATIENT
Start: 2023-06-10 | End: 2023-06-10 | Stop reason: SURG

## 2023-06-10 RX ORDER — LEVOTHYROXINE SODIUM 88 UG/1
88 TABLET ORAL
Status: DISCONTINUED | OUTPATIENT
Start: 2023-06-11 | End: 2023-06-13 | Stop reason: HOSPADM

## 2023-06-10 RX ORDER — HALOPERIDOL 5 MG/ML
1 INJECTION INTRAMUSCULAR
Status: DISCONTINUED | OUTPATIENT
Start: 2023-06-10 | End: 2023-06-10 | Stop reason: HOSPADM

## 2023-06-10 RX ORDER — TAMSULOSIN HYDROCHLORIDE 0.4 MG/1
0.4 CAPSULE ORAL
Status: DISCONTINUED | OUTPATIENT
Start: 2023-06-10 | End: 2023-06-13 | Stop reason: HOSPADM

## 2023-06-10 RX ORDER — HYDROMORPHONE HYDROCHLORIDE 1 MG/ML
0.25 INJECTION, SOLUTION INTRAMUSCULAR; INTRAVENOUS; SUBCUTANEOUS
Status: DISCONTINUED | OUTPATIENT
Start: 2023-06-10 | End: 2023-06-13 | Stop reason: HOSPADM

## 2023-06-10 RX ORDER — HYDROMORPHONE HYDROCHLORIDE 1 MG/ML
0.2 INJECTION, SOLUTION INTRAMUSCULAR; INTRAVENOUS; SUBCUTANEOUS
Status: DISCONTINUED | OUTPATIENT
Start: 2023-06-10 | End: 2023-06-10 | Stop reason: HOSPADM

## 2023-06-10 RX ORDER — ALBUTEROL SULFATE 90 UG/1
2 AEROSOL, METERED RESPIRATORY (INHALATION) EVERY 6 HOURS PRN
Status: DISCONTINUED | OUTPATIENT
Start: 2023-06-10 | End: 2023-06-13 | Stop reason: HOSPADM

## 2023-06-10 RX ORDER — SODIUM CHLORIDE, SODIUM LACTATE, POTASSIUM CHLORIDE, CALCIUM CHLORIDE 600; 310; 30; 20 MG/100ML; MG/100ML; MG/100ML; MG/100ML
INJECTION, SOLUTION INTRAVENOUS CONTINUOUS
Status: ACTIVE | OUTPATIENT
Start: 2023-06-10 | End: 2023-06-10

## 2023-06-10 RX ORDER — ACETAMINOPHEN 325 MG/1
650 TABLET ORAL EVERY 6 HOURS PRN
Status: DISCONTINUED | OUTPATIENT
Start: 2023-06-10 | End: 2023-06-13 | Stop reason: HOSPADM

## 2023-06-10 RX ORDER — SODIUM CHLORIDE 9 MG/ML
1000 INJECTION, SOLUTION INTRAVENOUS ONCE
Status: COMPLETED | OUTPATIENT
Start: 2023-06-10 | End: 2023-06-10

## 2023-06-10 RX ORDER — BISACODYL 10 MG
10 SUPPOSITORY, RECTAL RECTAL
Status: DISCONTINUED | OUTPATIENT
Start: 2023-06-10 | End: 2023-06-13 | Stop reason: HOSPADM

## 2023-06-10 RX ORDER — ALPRAZOLAM 0.5 MG/1
1 TABLET ORAL NIGHTLY PRN
Status: DISCONTINUED | OUTPATIENT
Start: 2023-06-10 | End: 2023-06-13 | Stop reason: HOSPADM

## 2023-06-10 RX ORDER — DEXAMETHASONE SODIUM PHOSPHATE 4 MG/ML
INJECTION, SOLUTION INTRA-ARTICULAR; INTRALESIONAL; INTRAMUSCULAR; INTRAVENOUS; SOFT TISSUE PRN
Status: DISCONTINUED | OUTPATIENT
Start: 2023-06-10 | End: 2023-06-10 | Stop reason: SURG

## 2023-06-10 RX ORDER — LEVOTHYROXINE SODIUM 88 UG/1
88 TABLET ORAL
COMMUNITY
End: 2023-06-22 | Stop reason: SDUPTHER

## 2023-06-10 RX ORDER — OXYCODONE HYDROCHLORIDE 5 MG/1
5 TABLET ORAL
Status: DISCONTINUED | OUTPATIENT
Start: 2023-06-10 | End: 2023-06-13 | Stop reason: HOSPADM

## 2023-06-10 RX ORDER — DIPHENHYDRAMINE HYDROCHLORIDE 50 MG/ML
12.5 INJECTION INTRAMUSCULAR; INTRAVENOUS
Status: DISCONTINUED | OUTPATIENT
Start: 2023-06-10 | End: 2023-06-10 | Stop reason: HOSPADM

## 2023-06-10 RX ORDER — ONDANSETRON 2 MG/ML
4 INJECTION INTRAMUSCULAR; INTRAVENOUS EVERY 4 HOURS PRN
Status: DISCONTINUED | OUTPATIENT
Start: 2023-06-10 | End: 2023-06-13 | Stop reason: HOSPADM

## 2023-06-10 RX ORDER — SODIUM CHLORIDE, SODIUM LACTATE, POTASSIUM CHLORIDE, CALCIUM CHLORIDE 600; 310; 30; 20 MG/100ML; MG/100ML; MG/100ML; MG/100ML
INJECTION, SOLUTION INTRAVENOUS CONTINUOUS
Status: DISCONTINUED | OUTPATIENT
Start: 2023-06-10 | End: 2023-06-10 | Stop reason: HOSPADM

## 2023-06-10 RX ORDER — OXYCODONE HYDROCHLORIDE 5 MG/1
2.5 TABLET ORAL
Status: DISCONTINUED | OUTPATIENT
Start: 2023-06-10 | End: 2023-06-13 | Stop reason: HOSPADM

## 2023-06-10 RX ORDER — SODIUM CHLORIDE, SODIUM LACTATE, POTASSIUM CHLORIDE, CALCIUM CHLORIDE 600; 310; 30; 20 MG/100ML; MG/100ML; MG/100ML; MG/100ML
INJECTION, SOLUTION INTRAVENOUS
Status: DISCONTINUED | OUTPATIENT
Start: 2023-06-10 | End: 2023-06-10 | Stop reason: SURG

## 2023-06-10 RX ADMIN — SODIUM CHLORIDE, POTASSIUM CHLORIDE, SODIUM LACTATE AND CALCIUM CHLORIDE: 600; 310; 30; 20 INJECTION, SOLUTION INTRAVENOUS at 19:58

## 2023-06-10 RX ADMIN — SUGAMMADEX 200 MG: 100 INJECTION, SOLUTION INTRAVENOUS at 19:55

## 2023-06-10 RX ADMIN — CEFTRIAXONE SODIUM 1000 MG: 1 INJECTION, POWDER, FOR SOLUTION INTRAMUSCULAR; INTRAVENOUS at 17:31

## 2023-06-10 RX ADMIN — SUCCINYLCHOLINE CHLORIDE 100 MG: 20 INJECTION, SOLUTION INTRAMUSCULAR; INTRAVENOUS at 19:16

## 2023-06-10 RX ADMIN — ONDANSETRON 4 MG: 2 INJECTION INTRAMUSCULAR; INTRAVENOUS at 19:47

## 2023-06-10 RX ADMIN — ROCURONIUM BROMIDE 50 MG: 50 INJECTION, SOLUTION INTRAVENOUS at 19:24

## 2023-06-10 RX ADMIN — OXYCODONE HYDROCHLORIDE 5 MG: 5 SOLUTION ORAL at 20:30

## 2023-06-10 RX ADMIN — LIDOCAINE HYDROCHLORIDE 4 ML: 40 SOLUTION TOPICAL at 19:17

## 2023-06-10 RX ADMIN — ONDANSETRON 4 MG: 2 INJECTION INTRAMUSCULAR; INTRAVENOUS at 15:31

## 2023-06-10 RX ADMIN — DEXAMETHASONE SODIUM PHOSPHATE 4 MG: 4 INJECTION INTRA-ARTICULAR; INTRALESIONAL; INTRAMUSCULAR; INTRAVENOUS; SOFT TISSUE at 19:47

## 2023-06-10 RX ADMIN — PROPOFOL 200 MG: 10 INJECTION, EMULSION INTRAVENOUS at 19:16

## 2023-06-10 RX ADMIN — TAMSULOSIN HYDROCHLORIDE 0.4 MG: 0.4 CAPSULE ORAL at 21:34

## 2023-06-10 RX ADMIN — SODIUM CHLORIDE, POTASSIUM CHLORIDE, SODIUM LACTATE AND CALCIUM CHLORIDE: 600; 310; 30; 20 INJECTION, SOLUTION INTRAVENOUS at 21:38

## 2023-06-10 RX ADMIN — FENTANYL CITRATE 100 MCG: 50 INJECTION, SOLUTION INTRAMUSCULAR; INTRAVENOUS at 19:52

## 2023-06-10 RX ADMIN — SODIUM CHLORIDE: 9 INJECTION, SOLUTION INTRAVENOUS at 19:14

## 2023-06-10 RX ADMIN — METRONIDAZOLE 500 MG: 500 INJECTION, SOLUTION INTRAVENOUS at 17:31

## 2023-06-10 RX ADMIN — CEFOTETAN DISODIUM 2 G: 2 INJECTION, POWDER, FOR SOLUTION INTRAMUSCULAR; INTRAVENOUS at 19:21

## 2023-06-10 RX ADMIN — ALPRAZOLAM 1 MG: 0.5 TABLET ORAL at 22:38

## 2023-06-10 RX ADMIN — SODIUM CHLORIDE 1000 ML: 9 INJECTION, SOLUTION INTRAVENOUS at 15:29

## 2023-06-10 RX ADMIN — MORPHINE SULFATE 4 MG: 4 INJECTION INTRAVENOUS at 18:14

## 2023-06-10 RX ADMIN — IOHEXOL 100 ML: 350 INJECTION, SOLUTION INTRAVENOUS at 16:37

## 2023-06-10 RX ADMIN — FENTANYL CITRATE 100 MCG: 50 INJECTION, SOLUTION INTRAMUSCULAR; INTRAVENOUS at 19:14

## 2023-06-10 ASSESSMENT — ENCOUNTER SYMPTOMS
EYE DISCHARGE: 0
MYALGIAS: 0
SHORTNESS OF BREATH: 0
FEVER: 0
FLANK PAIN: 0
ABDOMINAL PAIN: 1
FOCAL WEAKNESS: 0
BRUISES/BLEEDS EASILY: 0
CHILLS: 1
NERVOUS/ANXIOUS: 0
VOMITING: 1
EYE REDNESS: 0
NAUSEA: 1
COUGH: 0
STRIDOR: 0

## 2023-06-10 ASSESSMENT — PATIENT HEALTH QUESTIONNAIRE - PHQ9
2. FEELING DOWN, DEPRESSED, IRRITABLE, OR HOPELESS: NOT AT ALL
1. LITTLE INTEREST OR PLEASURE IN DOING THINGS: NOT AT ALL
SUM OF ALL RESPONSES TO PHQ9 QUESTIONS 1 AND 2: 0

## 2023-06-10 ASSESSMENT — PAIN DESCRIPTION - PAIN TYPE
TYPE: ACUTE PAIN
TYPE: SURGICAL PAIN

## 2023-06-10 ASSESSMENT — LIFESTYLE VARIABLES
CONSUMPTION TOTAL: NEGATIVE
HAVE PEOPLE ANNOYED YOU BY CRITICIZING YOUR DRINKING: NO
TOTAL SCORE: 0
EVER HAD A DRINK FIRST THING IN THE MORNING TO STEADY YOUR NERVES TO GET RID OF A HANGOVER: NO
HAVE YOU EVER FELT YOU SHOULD CUT DOWN ON YOUR DRINKING: NO
ALCOHOL_USE: NO
AVERAGE NUMBER OF DAYS PER WEEK YOU HAVE A DRINK CONTAINING ALCOHOL: 0
HOW MANY TIMES IN THE PAST YEAR HAVE YOU HAD 5 OR MORE DRINKS IN A DAY: 0
EVER FELT BAD OR GUILTY ABOUT YOUR DRINKING: NO
TOTAL SCORE: 0
TOTAL SCORE: 0
ON A TYPICAL DAY WHEN YOU DRINK ALCOHOL HOW MANY DRINKS DO YOU HAVE: 0

## 2023-06-10 ASSESSMENT — FIBROSIS 4 INDEX: FIB4 SCORE: 1.47

## 2023-06-10 NOTE — ED NOTES
Med rec completed per patient at bedside and patient's pharmacy, [facility] ([phone]).  Allergies reviewed with patient.  No outpatient antibiotics within the last 30 days.    Patient states that he has been out of his Erleada for about 3 weeks. Patient states that he did not take his Orgovyx last night because of upset stomach.

## 2023-06-10 NOTE — ED PROVIDER NOTES
ED Provider Note    CHIEF COMPLAINT  Chief Complaint   Patient presents with    Abdominal Pain     Reports N/V/D and chills since yesterday. Reports abd pain over area of abdominal hernia. Denies noting bloody stools or vomit.        EXTERNAL RECORDS REVIEWED  Outpatient Notes patient has a prostate cancer and is followed by urology.  He has a pacemaker for sick sinus syndrome.  He has a history of a 4.1 cm a sending aortic aneurysm on CT scan.    HPI/ROS  LIMITATION TO HISTORY   Select: : None  OUTSIDE HISTORIAN(S):  Significant other wife    David Darling is a 87 y.o. male who presents with abdominal pain and nausea and vomiting.  He had some diarrhea yesterday but has not been able to have a bowel movement since yesterday.  He states he feels like he has to go but cannot.  He feels his abdomen is distended.  He has a history of abdominal hernia and has been unable to reduce the hernia over the last day.  He is having severe abdominal pain.  He has been unable to keep any food or liquids down today.  No fevers.  No blood in his vomit.  No previous history of bowel obstruction.  No other abdominal surgeries.  Does have a history of prostate cancer currently taking oral medications.  No history of metastasis.  Patient is not on anticoagulation.    PAST MEDICAL HISTORY   has a past medical history of Allergy, Anemia, Arrhythmia, Bronchitis (2019), Cancer (HCC) (2010), COPD (chronic obstructive pulmonary disease) ([facility]), Macular degeneration, Muscle disorder, Psychiatric problem, Sinusitis, chronic, and Strep throat.    SURGICAL HISTORY   has a past surgical history that includes knee arthroscopy.    FAMILY HISTORY  Family History   Problem Relation Age of Onset    Cancer Mother     Heart Disease Father     Cancer Father     Stroke Sister        SOCIAL HISTORY  Social History     Tobacco Use    Smoking status: Never    Smokeless tobacco: Never   Vaping Use    Vaping Use: Never used   Substance and Sexual Activity     "Alcohol use: No    Drug use: Yes     Types: Oral     Comment: marigiancarlo tincture  for sleep nightly    Sexual activity: Never       CURRENT MEDICATIONS  Home Medications       Reviewed by Esperanza Kapoor R.N. (Registered Nurse) on 06/10/23 at 1453  Med List Status: Not Addressed     Medication Last Dose Status   albuterol (VENTOLIN OR PROVENTIL) 108 (90 BASE) MCG/ACT AERS  Active   albuterol 108 (90 Base) MCG/ACT Aero Soln inhalation aerosol  Active   Apalutamide 60 MG Tab  Active   benzonatate (TESSALON) 100 MG Cap  Active   Calcium Carb-Cholecalciferol (CALCIUM 1000 + D) 1000-20 MG-MCG Tab  Active   Cetirizine HCl (ZYRTEC PO)  Active   Diclofenac Sodium (VOLTAREN) 1 % GEL  Active   escitalopram (LEXAPRO) 10 MG Tab  Active   escitalopram (LEXAPRO) 10 MG Tab  Active   Fexofenadine-Pseudoephedrine (ALLEGRA-D PO)  Active   fluticasone (FLONASE) 50 MCG/ACT nasal spray  Active   levothyroxine (SYNTHROID) 88 MCG Tab  Active   Multiple Vitamins-Minerals (MULTI ADULT GUMMIES PO)  Active   predniSONE (DELTASONE) 20 MG Tab  Active   Relugolix (ORGOVYX) 120 MG Tab  Active   tamsulosin (FLOMAX) 0.4 MG capsule  Active                    ALLERGIES  Allergies   Allergen Reactions    Augmentin Vomiting       PHYSICAL EXAM  VITAL SIGNS: /75   Pulse 74   Temp 36.1 °C (96.9 °F) (Temporal)   Resp 20   Ht 1.753 m (5' 9\")   Wt 74.3 kg (163 lb 12.8 oz)   SpO2 98%   BMI 24.19 kg/m²    Constitutional:  Well developed, No acute distress, Non-toxic appearance.   HENT: Normocephalic, Atraumatic, Bilateral external ears normal, oral mucosa dry, Nose normal.   Eyes: PERRL, EOMI, Conjunctiva normal  Thorax & Lungs: Normal breath sounds, No respiratory distress,    Abdomen: Abdominal distention, a umbilical hernia is present that I am unable to reduce, there is some warmth and erythema to the skin over the umbilical hernia, there is diminished bowel sounds, there is some slight hyperactive bowels however right by the hernia site, " diffusely tender but most tender around the hernia.  Skin: Pale skin, no erythema, No rash.   Back: No tenderness, No CVA tenderness.   Extremities: Intact distal pulses, No edema, No tenderness   Neurologic: Alert & oriented x 3, Normal motor function, Normal sensory function, No focal deficits noted.   Psychiatric: appropriate     DIAGNOSTIC STUDIES / PROCEDURES  EKG  I have independently interpreted this EKG      LABS  Results for orders placed or performed during the hospital encounter of 06/10/23   CBC WITH DIFFERENTIAL   Result Value Ref Range    WBC 12.4 (H) 4.8 - 10.8 K/uL    RBC 4.58 (L) 4.70 - 6.10 M/uL    Hemoglobin 14.1 14.0 - 18.0 g/dL    Hematocrit 42.3 42.0 - 52.0 %    MCV 92.4 81.4 - 97.8 fL    MCH 30.8 27.0 - 33.0 pg    MCHC 33.3 32.3 - 36.5 g/dL    RDW 45.9 35.9 - 50.0 fL    Platelet Count 229 164 - 446 K/uL    MPV 12.9 9.0 - 12.9 fL    Neutrophils-Polys 87.10 (H) 44.00 - 72.00 %    Lymphocytes 6.10 (L) 22.00 - 41.00 %    Monocytes 6.00 0.00 - 13.40 %    Eosinophils 0.20 0.00 - 6.90 %    Basophils 0.30 0.00 - 1.80 %    Immature Granulocytes 0.30 0.00 - 0.90 %    Nucleated RBC 0.00 0.00 - 0.20 /100 WBC    Neutrophils (Absolute) 10.80 (H) 1.82 - 7.42 K/uL    Lymphs (Absolute) 0.75 (L) 1.00 - 4.80 K/uL    Monos (Absolute) 0.74 0.00 - 0.85 K/uL    Eos (Absolute) 0.02 0.00 - 0.51 K/uL    Baso (Absolute) 0.04 0.00 - 0.12 K/uL    Immature Granulocytes (abs) 0.04 0.00 - 0.11 K/uL    NRBC (Absolute) 0.00 K/uL   COMP METABOLIC PANEL   Result Value Ref Range    Sodium 135 135 - 145 mmol/L    Potassium 4.4 3.6 - 5.5 mmol/L    Chloride 103 96 - 112 mmol/L    Co2 18 (L) 20 - 33 mmol/L    Anion Gap 14.0 7.0 - 16.0    Glucose 126 (H) 65 - 99 mg/dL    Bun 19 8 - 22 mg/dL    Creatinine 0.92 0.50 - 1.40 mg/dL    Calcium 9.8 8.4 - 10.2 mg/dL    AST(SGOT) 28 12 - 45 U/L    ALT(SGPT) 14 2 - 50 U/L    Alkaline Phosphatase 85 30 - 99 U/L    Total Bilirubin 0.9 0.1 - 1.5 mg/dL    Albumin 4.3 3.2 - 4.9 g/dL    Total Protein  6.9 6.0 - 8.2 g/dL    Globulin 2.6 1.9 - 3.5 g/dL    A-G Ratio 1.7 g/dL   LIPASE   Result Value Ref Range    Lipase 32 7 - 58 U/L   CORRECTED CALCIUM   Result Value Ref Range    Correct Calcium 9.6 8.5 - 10.5 mg/dL   ESTIMATED GFR   Result Value Ref Range    GFR (CKD-EPI) 81 >60 mL/min/1.73 m 2   EKG   Result Value Ref Range    Report       [facility] Emergency Dept.    Test Date:  2023-06-10  Pt Name:    ARACELIS SHRESTHA                Department: [facility]  MRN:        [id number]                      [facility]  Gender:     Male                         Technician: [id number]  :        [id number]                   Requested By:ZEKE CACERES  Order #:    [id number]                    Reading MD:    Measurements  Intervals                                Axis  Rate:       73                           P:          -82  OH:         198                          QRS:        -65  QRSD:       157                          T:          97  QT:         446  QTc:        492    Interpretive Statements  Atrial-sensed ventricular-paced rhythm  No further analysis attempted due to paced rhythm  Compared to ECG 2021 05:30:26  No significant changes          RADIOLOGY  I have independently interpreted the diagnostic imaging associated with this visit and am waiting the final reading from the radiologist.   My preliminary interpretation is as follows: Small bowel obstruction  Radiologist interpretation:   CT-ABDOMEN-PELVIS WITH   Final Result      1.  There is a small bowel obstruction with an incarcerated umbilical hernia containing an inflamed loop of small bowel within the hernia. There are normal caliber distal ileal loops.   2.  There is diverticulosis without diverticulitis.   3.  Nonspecific groundglass opacity right lung base. Recommend follow-up per Fleischner guidelines.      Fleischner Society pulmonary nodule recommendations:   Ground Glass: CT at 6-12 months to confirm persistence, then CT every 2  years until 5 years      Part Solid: CT at 3-6 months to confirm persistence. If unchanged and solid component remains less than 6 mm, annual CT should be performed for 5 years.      Comments: In practice, part-solid nodules cannot be defined as such until equal to or greater than 6 mm, and nodules less than 6 mm do not usually require follow-up. Persistent part-solid nodules with solid components equal to or greater than 6 mm should    be considered highly suspicious.      Note: These recommendations do not apply to lung cancer screening, patients with immunosuppression, or patients with known primary cancer.      Fleischner Society 2017 Guidelines for Management of Incidentally Detected Pulmonary Nodules in Adults            COURSE & MEDICAL DECISION MAKING    ED Observation Status? Yes; I am placing the patient in to an observation status due to a diagnostic uncertainty as well as therapeutic intensity. Patient placed in observation status at 3:27 PM, 6/10/2023.     Observation plan is as follows: Serial abdominal exams, labs and CAT scan    Upon Reevaluation, the patient's condition has: not improved; and will be escalated to hospitalization.    Patient discharged from ED Observation status at 5:40 PM  (Time) 6/10/2023   (Date).     INITIAL ASSESSMENT, COURSE AND PLAN  Care Narrative: Patient presents emergency department with abdominal pain.  He has abdominal distention and evidence of a hernia that I am unable to reduce.  I am concerned he has a incarcerated hernia.  Plan is to obtain CT imaging to further evaluate this.  We will give pain medications.  Patient is NPO.    Adolfo studies show a white count of 12.4.  No anemia.  There is a slight left shift.  CO2 is 18.  Glucose is 126.  Normal liver function tests.  CT imaging has returned and shows evidence of an incarcerated hernia with an inflamed loop of small bowel as well as a small bowel obstruction.  We will add preop labs.    Discussed the case with  Dr. Sánchez who is on-call for surgery.  He will come and see the patient.    Discussed the case with Dr. Garcia the hospitalist who will hospitalize the patient.  HYDRATION: Based on the patient's presentation of Inability to take oral fluids the patient was given IV fluids. IV Hydration was used because oral hydration was not adequate alone. Upon recheck following hydration, the patient was improved.      DISPOSITION AND DISCUSSIONS    Patient presented to the emergency department with abdominal distention and abdominal pain and vomiting.  He had evidence on exam of a umbilical hernia with redness and warmth to the area and tenderness.  I suspect that he likely had an incarcerated hernia.  CT imaging shows evidence of an incarcerated hernia with bowel inflammation as well as a small bowel obstruction.  Patient has been treated with antibiotics due to the inflammation of the bowel.  I have ordered preop laboratory studies.  Patient's pain is currently controlled.  He has stable vital signs.    I have discussed management of the patient with the following physicians and ZACH's:  Dr. Sánchez and Dr. Garcia    Patient is hospitalized in guarded condition.    FINAL DIAGNOSIS  1. Incarcerated umbilical hernia    2. Small bowel obstruction (HCC)           Electronically signed by: Kavita Manzanares M.D., 6/10/2023 3:13 PM

## 2023-06-11 LAB
ALBUMIN SERPL BCP-MCNC: 4.1 G/DL (ref 3.2–4.9)
ALBUMIN/GLOB SERPL: 1.8 G/DL
ALP SERPL-CCNC: 79 U/L (ref 30–99)
ALT SERPL-CCNC: 17 U/L (ref 2–50)
ANION GAP SERPL CALC-SCNC: 12 MMOL/L (ref 7–16)
AST SERPL-CCNC: 26 U/L (ref 12–45)
BILIRUB SERPL-MCNC: 0.6 MG/DL (ref 0.1–1.5)
BUN SERPL-MCNC: 19 MG/DL (ref 8–22)
CALCIUM ALBUM COR SERPL-MCNC: 8.8 MG/DL (ref 8.5–10.5)
CALCIUM SERPL-MCNC: 8.9 MG/DL (ref 8.4–10.2)
CHLORIDE SERPL-SCNC: 106 MMOL/L (ref 96–112)
CO2 SERPL-SCNC: 20 MMOL/L (ref 20–33)
CREAT SERPL-MCNC: 0.88 MG/DL (ref 0.5–1.4)
ERYTHROCYTE [DISTWIDTH] IN BLOOD BY AUTOMATED COUNT: 47 FL (ref 35.9–50)
GFR SERPLBLD CREATININE-BSD FMLA CKD-EPI: 83 ML/MIN/1.73 M 2
GLOBULIN SER CALC-MCNC: 2.3 G/DL (ref 1.9–3.5)
GLUCOSE SERPL-MCNC: 146 MG/DL (ref 65–99)
HCT VFR BLD AUTO: 38.1 % (ref 42–52)
HGB BLD-MCNC: 12.9 G/DL (ref 14–18)
MAGNESIUM SERPL-MCNC: 2.3 MG/DL (ref 1.5–2.5)
MCH RBC QN AUTO: 31.2 PG (ref 27–33)
MCHC RBC AUTO-ENTMCNC: 33.9 G/DL (ref 32.3–36.5)
MCV RBC AUTO: 92 FL (ref 81.4–97.8)
PLATELET # BLD AUTO: 263 K/UL (ref 164–446)
PMV BLD AUTO: 12 FL (ref 9–12.9)
POTASSIUM SERPL-SCNC: 4.1 MMOL/L (ref 3.6–5.5)
PROT SERPL-MCNC: 6.4 G/DL (ref 6–8.2)
RBC # BLD AUTO: 4.14 M/UL (ref 4.7–6.1)
SODIUM SERPL-SCNC: 138 MMOL/L (ref 135–145)
WBC # BLD AUTO: 19.8 K/UL (ref 4.8–10.8)

## 2023-06-11 PROCEDURE — 83735 ASSAY OF MAGNESIUM: CPT

## 2023-06-11 PROCEDURE — 94760 N-INVAS EAR/PLS OXIMETRY 1: CPT

## 2023-06-11 PROCEDURE — 700105 HCHG RX REV CODE 258: Performed by: HOSPITALIST

## 2023-06-11 PROCEDURE — 85027 COMPLETE CBC AUTOMATED: CPT

## 2023-06-11 PROCEDURE — A9270 NON-COVERED ITEM OR SERVICE: HCPCS | Performed by: HOSPITALIST

## 2023-06-11 PROCEDURE — A9270 NON-COVERED ITEM OR SERVICE: HCPCS | Performed by: INTERNAL MEDICINE

## 2023-06-11 PROCEDURE — 700102 HCHG RX REV CODE 250 W/ 637 OVERRIDE(OP): Performed by: HOSPITALIST

## 2023-06-11 PROCEDURE — 700111 HCHG RX REV CODE 636 W/ 250 OVERRIDE (IP): Performed by: HOSPITALIST

## 2023-06-11 PROCEDURE — 99233 SBSQ HOSP IP/OBS HIGH 50: CPT | Performed by: INTERNAL MEDICINE

## 2023-06-11 PROCEDURE — 770001 HCHG ROOM/CARE - MED/SURG/GYN PRIV*

## 2023-06-11 PROCEDURE — 700102 HCHG RX REV CODE 250 W/ 637 OVERRIDE(OP): Performed by: INTERNAL MEDICINE

## 2023-06-11 PROCEDURE — 36415 COLL VENOUS BLD VENIPUNCTURE: CPT

## 2023-06-11 PROCEDURE — 700101 HCHG RX REV CODE 250: Performed by: HOSPITALIST

## 2023-06-11 PROCEDURE — 80053 COMPREHEN METABOLIC PANEL: CPT

## 2023-06-11 RX ORDER — BENZONATATE 100 MG/1
100 CAPSULE ORAL 3 TIMES DAILY PRN
Status: DISCONTINUED | OUTPATIENT
Start: 2023-06-11 | End: 2023-06-13 | Stop reason: HOSPADM

## 2023-06-11 RX ADMIN — LEVOTHYROXINE SODIUM 88 MCG: 88 TABLET ORAL at 05:32

## 2023-06-11 RX ADMIN — SENNOSIDES AND DOCUSATE SODIUM 2 TABLET: 50; 8.6 TABLET ORAL at 05:32

## 2023-06-11 RX ADMIN — METRONIDAZOLE 500 MG: 5 INJECTION, SOLUTION INTRAVENOUS at 17:59

## 2023-06-11 RX ADMIN — ALPRAZOLAM 1 MG: 0.5 TABLET ORAL at 20:50

## 2023-06-11 RX ADMIN — ACETAMINOPHEN 650 MG: 325 TABLET ORAL at 09:15

## 2023-06-11 RX ADMIN — ESCITALOPRAM OXALATE 10 MG: 10 TABLET ORAL at 05:32

## 2023-06-11 RX ADMIN — SODIUM CHLORIDE, POTASSIUM CHLORIDE, SODIUM LACTATE AND CALCIUM CHLORIDE: 600; 310; 30; 20 INJECTION, SOLUTION INTRAVENOUS at 10:46

## 2023-06-11 RX ADMIN — OXYCODONE 5 MG: 5 TABLET ORAL at 16:34

## 2023-06-11 RX ADMIN — SENNOSIDES AND DOCUSATE SODIUM 2 TABLET: 50; 8.6 TABLET ORAL at 17:21

## 2023-06-11 RX ADMIN — CEFTRIAXONE SODIUM 1000 MG: 1 INJECTION, POWDER, FOR SOLUTION INTRAMUSCULAR; INTRAVENOUS at 17:20

## 2023-06-11 RX ADMIN — TAMSULOSIN HYDROCHLORIDE 0.4 MG: 0.4 CAPSULE ORAL at 20:50

## 2023-06-11 RX ADMIN — BENZONATATE 100 MG: 100 CAPSULE ORAL at 15:48

## 2023-06-11 RX ADMIN — METRONIDAZOLE 500 MG: 5 INJECTION, SOLUTION INTRAVENOUS at 05:38

## 2023-06-11 ASSESSMENT — COGNITIVE AND FUNCTIONAL STATUS - GENERAL
MOBILITY SCORE: 21
TURNING FROM BACK TO SIDE WHILE IN FLAT BAD: A LITTLE
CLIMB 3 TO 5 STEPS WITH RAILING: A LITTLE
SUGGESTED CMS G CODE MODIFIER MOBILITY: CJ
DAILY ACTIVITIY SCORE: 23
SUGGESTED CMS G CODE MODIFIER DAILY ACTIVITY: CI
WALKING IN HOSPITAL ROOM: A LITTLE
DRESSING REGULAR LOWER BODY CLOTHING: A LITTLE

## 2023-06-11 ASSESSMENT — FIBROSIS 4 INDEX: FIB4 SCORE: 2.06

## 2023-06-11 ASSESSMENT — PAIN DESCRIPTION - PAIN TYPE
TYPE: SURGICAL PAIN
TYPE: SURGICAL PAIN
TYPE: ACUTE PAIN
TYPE: ACUTE PAIN
TYPE: SURGICAL PAIN
TYPE: SURGICAL PAIN
TYPE: ACUTE PAIN;SURGICAL PAIN

## 2023-06-11 ASSESSMENT — PATIENT HEALTH QUESTIONNAIRE - PHQ9
1. LITTLE INTEREST OR PLEASURE IN DOING THINGS: NOT AT ALL
2. FEELING DOWN, DEPRESSED, IRRITABLE, OR HOPELESS: NOT AT ALL
SUM OF ALL RESPONSES TO PHQ9 QUESTIONS 1 AND 2: 0

## 2023-06-11 ASSESSMENT — ENCOUNTER SYMPTOMS
CONSTIPATION: 1
FEVER: 1
ABDOMINAL PAIN: 1

## 2023-06-11 NOTE — PROGRESS NOTES
"S:  87 y.o.male s/p small bowel resection ending strangulated hernia repair POD#1.  Patient did well overnight.  Tolerating a diet.  Minimal ambulation.    O:  /66   Pulse 72   Temp 36.9 °C (98.4 °F) (Oral)   Resp 18   Ht 1.753 m (5' 9\")   Wt 76 kg (167 lb 8.8 oz)   SpO2 91%   I/O last 3 completed shifts:  In: 2037.6 [P.O.:240; I.V.:1699.2]  Out: 230 [Urine:230]  Recent Labs     06/10/23  1520 06/11/23  0132   SODIUM 135 138   POTASSIUM 4.4 4.1   CHLORIDE 103 106   CO2 18* 20   GLUCOSE 126* 146*   BUN 19 19   CREATININE 0.92 0.88   CALCIUM 9.8 8.9     Recent Labs     06/10/23  1520 06/11/23  0132   WBC 12.4* 19.8*   RBC 4.58* 4.14*   HEMOGLOBIN 14.1 12.9*   HEMATOCRIT 42.3 38.1*   MCV 92.4 92.0   MCH 30.8 31.2   MCHC 33.3 33.9   RDW 45.9 47.0   PLATELETCT 229 263   MPV 12.9 12.0       Alert and Oriented x3, No Acute Distress  Normal Respiratory Effort  Abdomen soft, appropriately tender  Incisions/Bandages clean/dry/intact  Extremities warm and well perfused    A/P: Status post strangulated hernia repair  Pain control with multimodal approach and judicious narcotics  Diet as tolerated  Fluids per primary team  Ambulate tid and ad denita  Leukocytosis is likely reactive but would continue antibiotics for a total of 4 days given the patient's contamination and perforation of bowel at time of surgery  DVT prophylaxis okay  Agree with placement with home health or skilled nursing facility based on patient's recovery  We will follow  Dr. Thao to assume care tomorrow  Darius Sánchez MD PhD  [facility]  Colon and Rectal Surgeon  [phone]    "

## 2023-06-11 NOTE — PROGRESS NOTES
4 Eyes Skin Assessment Completed by Zeke RN and Iban RN.    Head WDL  Ears WDL  Nose WDL, birthmark  Mouth WDL  Neck WDL  Breast/Chest WDL  Shoulder Blades WDL  Spine WDL  (R) Arm/Elbow/Hand WDL  (L) Arm/Elbow/Hand WDL  Abdomen Incision  Groin WDL  Scrotum/Coccyx/Buttocks WDL  (R) Leg WDL  (L) Leg WDL  (R) Heel/Foot/Toe WDL  (L) Heel/Foot/Toe WDL          Devices In Places Blood Pressure Cuff, Pulse Ox, SCD's, and Nasal Cannula      Interventions In Place NC W/Ear Foams and Pillows    Possible Skin Injury No    Pictures Uploaded Into Epic N/A  Wound Consult Placed N/A  RN Wound Prevention Protocol Ordered No

## 2023-06-11 NOTE — HOSPITAL COURSE
"Per notes, \"87 y.o. male with a past medical history of prostate cancer, sick sinus syndrome s/p pacemaker placement and hypothyroidism who presented 6/10/2023 with nausea vomiting, chills and abdominal pain.  Patient reports that he has not been feeling well since yesterday.  He has been having progressively worsening abdominal pain.  Pain is constant and has been getting worse.  He also has abdominal distention.  In addition he feels weak and having chills.  He does have nausea.  Denies noticing any blood in his vomitus.\"  "

## 2023-06-11 NOTE — PROGRESS NOTES
Report received from PACU nurse, pt came in via gurney at 2106.  Pt able to tolerate ambulation to bed; and placed on comfortable position.  Pt alert and oriented x4, received with ongoing oxygen at 2 L/min via nasal cannula.   Assessment done. Pain scale of 3/10 with movement. Denies any nausea and SOB.   Dressing is dry and intact with old drainage noted, kept monitored.  Fall precautions in place. Call light within reach. Bed placed on lowest position. Belongings within reach.  Vs taken and monitored.   Pt states no urge to void yet at this time.  Needs attended well.  Hourly rounding in progress.    2206 BP trending up and informed ; placed order of one time Hydralazine IV.     2215 BP rechecked, BP of  120/85. Hydralazine not given at this time. Will continue to monitor BP.  Pt asked for Xanax; medication given as per MAR.  Hourly rounding in progress.

## 2023-06-11 NOTE — ANESTHESIA PROCEDURE NOTES
Airway    Date/Time: 6/10/2023 7:17 PM    Performed by: Thomas Matson M.D.  Authorized by: Thomas Matson M.D.    Location:  OR  Urgency:  Elective  Difficult Airway: No    Indications for Airway Management:  Anesthesia      Spontaneous Ventilation: absent    Sedation Level:  Deep  Preoxygenated: Yes    Patient Position:  Sniffing  Mask Difficulty Assessment:  0 - not attempted  Final Airway Type:  Endotracheal airway  Final Endotracheal Airway:  ETT  Cuffed: Yes    Technique Used for Successful ETT Placement:  Direct laryngoscopy    Insertion Site:  Oral  Blade Type:  Himanshu  Laryngoscope Blade/Videolaryngoscope Blade Size:  3  ETT Size (mm):  7.5  Measured from:  Teeth  ETT to Teeth (cm):  22  Placement Verified by: auscultation and capnometry    Cormack-Lehane Classification:  Grade I - full view of glottis  Number of Attempts at Approach:  1

## 2023-06-11 NOTE — OP REPORT
DATE OF OPERATION: 6/10/2023    PREOPERATIVE DIAGNOSIS: Ventral hernia with strangulated bowel and obstruction    POSTOPERATIVE DIAGNOSIS: 8 cm ventral hernia with strangulated small bowel with obstruction    PROCEDURE PERFORMED:  1.  Exploratory laparotomy  2.  Small bowel resection  3.  Ventral hernia repair    SURGEON: Darius Sánchez M.D.    ASSISTANT: None    ANESTHESIOLOGIST: Thomas Matson M.D.    ANESTHESIA:  General endotracheal anesthesia.    ASA CLASSIFICATION: III.    INDICATION:  The patient is a 87 y.o. male with incarcerated versus regulated ventral hernia with obstruction. He is taken to the operating room for exploratory laparotomy possible bowel resection with hernia repair.    FINDINGS: Strangulated small bowel with perforation and contamination of the hernia sac resected with a primary side-to-side functional end-to-end stapled anastomosis.    WOUND CLASSIFICATION: Class IV, Dirty, Infected.    SPECIMEN: Small bowel segment.    ESTIMATED BLOOD LOSS: 10 mL.    PROCEDURE:    After informed witnessed consent was obtained, the patient was taken to the operating room and underwent general endotracheal anesthesia without incident.  Preoperative antibiotics were administered.  Bilateral lower sequential compression devices were placed.  The abdomen was prepped and draped in the usual standard sterile fashion.  A timeout was performed verifying the correct patient, procedure, site, positioning in availability of equipment prior to starting surgery.   The bowel was unable to be reduced after the patient was placed asleep and a midline incision was made superior to the incarcerated bowel.  The hernia sac was opened and immediately identified contamination with perforated small bowel.  I continued my incision until all of the small bowel was freed I then ran the small bowel from the ligament of Treitz to the terminal ileum identifying no other defects.  Using a linear cutting stapler I divided the small  bowel proximally and distally.  I created a side-to-side functional end-to-end stapled anastomosis with widely patent anastomosis on palpation.  The bowel was returned to the abdominal cavity.  The fascia was closed with #1 running PDS sutures.  The skin was closed with skin staples.  A dry sterile dressing was placed.  All sponge and instrument counts were correct x2.  Patient was extubated and taken to the postanesthesia care unit in stable condition.  Darius Sánchez MD PhD  [facility]  Colon and Rectal Surgeon  [phone]

## 2023-06-11 NOTE — CARE PLAN
Problem: Fall Risk  Goal: Patient will remain free from falls  Outcome: Progressing     Problem: Early Mobilization - Post Surgery  Goal: Early mobilization post surgery  Outcome: Progressing     Problem: Pain - Post Surgery  Goal: Alleviation or reduction of pain post surgery  Outcome: Progressing   The patient is Stable - Low risk of patient condition declining or worsening    Shift Goals  Clinical Goals: pain will be lesser than 5/10, pt will be able to ambulate  Patient Goals: pain management, rest comfortably  Family Goals: updated pt's son thru phone call, no ones answers, left a voicemail.    Progress made toward(s) clinical / shift goals:  pain when cough. Cough medication given as ordered. Pt denies pain. Pt able to ambulate up to bathroom and to chair. Educated pt the importance of ambulation. Pt able to rest comfortably.     Patient is not progressing towards the following goals: progressing on other goals.

## 2023-06-11 NOTE — OR NURSING
1912: Patient allergies and NPO status verified, and belongings secured. Patient verbalizes understanding of pain scale, expected course of stay and plan of care. Surgical site verified with patient. IV access established in ER. Sequentials placed on legs. Triple aim completed.

## 2023-06-11 NOTE — DISCHARGE INSTRUCTIONS
Discharge Education for patients on TIGIST (Obstructive Sleep Apnea) Protocol    Prior to receiving sedation or anesthesia, we screen all patients for Obstructive Sleep Apnea.  During your screening, you were identified as having suspected, but not confirmed Obstructive Sleep Apnea(TIGIST).    What is Obstructive Sleep Apnea?  Sleep apnea (AP-ne-ah) is a common disorder which involves breathing pauses that occur during sleep.  These can last from 10 seconds to a minute or longer.  Normal breathing resumes often with a loud snort or choking sound.    Sleep apnea occurs in all age groups and both genders but is more common in men and people over 40 years of age.  It has been estimated that as many as 18 million Americans have sleep apnea.  Most people who have sleep apnea don’t know they have it because it only occurs during sleep.  A family member and/or bed partner may first notice the signs of sleep apnea.  Sleep apnea is a chronic (ongoing) condition that disrupts the quality and quantity of your sleep repeatedly throughout the night.  This often results in excessive daytime sleepiness or fatigue during the day.  It may also contribute to high blood pressure, heart problems, and complications following medications used for surgery and procedures.    To establish a definitive diagnosis, further testing from a specialist would be needed.  We recommend that you follow up with your primary care physician.    We recommend that you should be with an adult observer for at least 24 hours after your sedation/anesthesia.  If you have a CPAP machine, you should wear it during any sleep period (day or night) for the week following your procedure.  We encourage you to sleep on your side or in a sitting position, even with napping.  Lying flat on your back increases the risk of apnea and airway obstruction during your post procedure recovery period.    It is important to prevent over-sedation that could increase your risk for apnea.   Please take all pain medication as directed by your physician.  If you are not getting pain relief, please contact your physician to discuss possible approaches to relieving pain while minimizing medications that can affect your breathing and oxygen levels.

## 2023-06-11 NOTE — PROGRESS NOTES
Received report from night nurse, DIONNA RN. Assumed care at 0730 pt is awake, alert and oriented, pt complains of pain 7/10 when coughing. Refused to have oxycodone as pain only when coughing and pain is not constant per patient. Pt back to sleep.   0800 pt's son David called asking for hs father's plan of care today.   1128 called pt's son to give an update, no ones answers, left a voicemail.   Pt updated on plan of care for the day. All questions answered. No other needs at this time. Call light and belongings within reach, bed locked and in lowest position. Pt educated to call for assistance. Hourly rounding in place. Encourage pt to ambulate. Pt able to ambulate up to chair and bathroom.   1526 pt's son madison called for updates.   1600 no passing of gas or bowel movement.

## 2023-06-11 NOTE — H&P
Hospital Medicine History & Physical Note    Date of Service  6/10/2023    Primary Care Physician  Mohsen Flores M.D.    Consultants  General surgery, Dr Dr Sánchez      Code Status  Full Code    Chief Complaint  Chief Complaint   Patient presents with    Abdominal Pain     Reports N/V/D and chills since yesterday. Reports abd pain over area of abdominal hernia. Denies noting bloody stools or vomit.      History of Presenting Illness  David Darling is a 87 y.o. male with a past medical history of prostate cancer, sick sinus syndrome s/p pacemaker placement and hypothyroidism who presented 6/10/2023 with nausea vomiting, chills and abdominal pain.  Patient reports that he has not been feeling well since yesterday.  He has been having progressively worsening abdominal pain.  Pain is constant and has been getting worse.  He also has abdominal distention.  In addition he feels weak and having chills.  He does have nausea.  Denies noticing any blood in his vomitus.     I discussed the plan of care with emergency department physician, the patient and patient wife present at bedside in the emergency room.    Review of Systems  Review of Systems   Constitutional:  Positive for chills and malaise/fatigue. Negative for fever.   Eyes:  Negative for discharge and redness.   Respiratory:  Negative for cough, shortness of breath and stridor.    Cardiovascular:  Negative for chest pain and leg swelling.   Gastrointestinal:  Positive for abdominal pain, nausea and vomiting.   Genitourinary:  Negative for flank pain.   Musculoskeletal:  Negative for myalgias.   Skin: Negative.    Neurological:  Negative for focal weakness.   Endo/Heme/Allergies:  Does not bruise/bleed easily.   Psychiatric/Behavioral:  The patient is not nervous/anxious.      Past Medical History   has a past medical history of Allergy, Anemia, Arrhythmia, Bronchitis (2019), Cancer ([facility]) (2010), COPD (chronic obstructive pulmonary disease) ([facility]), Macular  degeneration, Muscle disorder, Psychiatric problem, Sinusitis, chronic, and Strep throat.    Surgical History   has a past surgical history that includes knee arthroscopy.     Family History  family history includes Cancer in his father and mother; Heart Disease in his father; Stroke in his sister.      Social History   reports that he has never smoked. He has never used smokeless tobacco. He reports current drug use. Drug: Oral. He reports that he does not drink alcohol.    Allergies  Allergies   Allergen Reactions    Augmentin [Amoxicillin-Pot Clavulanate] Vomiting     Medications  Prior to Admission Medications   Prescriptions Last Dose Informant Patient Reported? Taking?   ALPRAZolam (XANAX) 1 MG Tab 6/9/2023 at HS Patient, Patient's Home Pharmacy Yes Yes   Sig: Take 1 mg by mouth at bedtime as needed for Sleep.   Apalutamide (ERLEADA) 60 MG Tab ABOUT 3 WEEKS AGO at OUT Patient Yes Yes   Sig: Take 120 mg by mouth every day. 2 tablets = 120 mg.   Multiple Vitamins-Minerals (PRESERVISION AREDS 2) Cap 6/9/2023 at PM Patient Yes Yes   Sig: Take 1 Capsule by mouth 2 times a day.   Relugolix (ORGOVYX) 120 MG Tab 6/8/2023 at PM Patient Yes No   Sig: Take 120 mg by mouth every day.   albuterol 108 (90 Base) MCG/ACT Aero Soln inhalation aerosol 6/8/2023 at PM Patient No No   Sig: Inhale 2 Puffs every 6 hours as needed for Shortness of Breath.   escitalopram (LEXAPRO) 10 MG Tab 6/9/2023 at AM Patient, Patient's Home Pharmacy No No   Sig: Take 1 Tablet by mouth every day.   levothyroxine (SYNTHROID) 88 MCG Tab 6/9/2023 at AM Patient, Patient's Home Pharmacy Yes Yes   Sig: Take 88 mcg by mouth every morning on an empty stomach.   multivitamin Tab 6/9/2023 at PM Patient Yes Yes   Sig: Take 1 Tablet by mouth every evening.   tamsulosin (FLOMAX) 0.4 MG capsule 6/9/2023 at HS Patient, Patient's Home Pharmacy Yes No   Sig: Take 0.4 mg by mouth at bedtime.      Facility-Administered Medications: None     Physical Exam  Temp:   [36.1 °C (96.9 °F)-36.9 °C (98.4 °F)] 36.9 °C (98.4 °F)  Pulse:  [68-74] 68  Resp:  [12-20] 16  BP: (128-160)/(65-75) 147/69  SpO2:  [90 %-100 %] 90 %  Blood Pressure : 128/75   Temperature: 36.1 °C (96.9 °F)   Pulse: 74   Respiration: 20   Pulse Oximetry: 98 %     Physical Exam  Constitutional:       General: He is not in acute distress.     Appearance: He is not ill-appearing or diaphoretic.   HENT:      Head: Atraumatic.      Right Ear: External ear normal.      Left Ear: External ear normal.      Nose: No congestion or rhinorrhea.      Mouth/Throat:      Mouth: Mucous membranes are dry.   Eyes:      General: No scleral icterus.        Right eye: No discharge.         Left eye: No discharge.      Pupils: Pupils are equal, round, and reactive to light.   Cardiovascular:      Rate and Rhythm: Normal rate and regular rhythm.   Pulmonary:      Effort: Pulmonary effort is normal.   Abdominal:      General: There is distension.      Tenderness: There is abdominal tenderness. There is no guarding or rebound.   Musculoskeletal:      Cervical back: Neck supple. No rigidity. No muscular tenderness.      Right lower leg: No edema.      Left lower leg: No edema.   Skin:     General: Skin is dry.      Coloration: Skin is not jaundiced or pale.   Neurological:      Mental Status: He is alert and oriented to person, place, and time.      Coordination: Coordination normal.   Psychiatric:         Mood and Affect: Mood normal.         Behavior: Behavior normal.       Laboratory:  Recent Labs     06/10/23  1520   WBC 12.4*   RBC 4.58*   HEMOGLOBIN 14.1   HEMATOCRIT 42.3   MCV 92.4   MCH 30.8   MCHC 33.3   RDW 45.9   PLATELETCT 229   MPV 12.9     Recent Labs     06/10/23  1520   SODIUM 135   POTASSIUM 4.4   CHLORIDE 103   CO2 18*   GLUCOSE 126*   BUN 19   CREATININE 0.92   CALCIUM 9.8     Recent Labs     06/10/23  1520   ALTSGPT 14   ASTSGOT 28   ALKPHOSPHAT 85   TBILIRUBIN 0.9   LIPASE 32   GLUCOSE 126*     Recent Labs      06/10/23  1750   INR 1.17*     No results for input(s): NTPROBNP in the last 72 hours.      No results for input(s): TROPONINT in the last 72 hours.    Imaging:  CT-ABDOMEN-PELVIS WITH   Final Result      1.  There is a small bowel obstruction with an incarcerated umbilical hernia containing an inflamed loop of small bowel within the hernia. There are normal caliber distal ileal loops.   2.  There is diverticulosis without diverticulitis.   3.  Nonspecific groundglass opacity right lung base. Recommend follow-up per Fleischner guidelines.      Fleischner Society pulmonary nodule recommendations:   Ground Glass: CT at 6-12 months to confirm persistence, then CT every 2 years until 5 years      Part Solid: CT at 3-6 months to confirm persistence. If unchanged and solid component remains less than 6 mm, annual CT should be performed for 5 years.      Comments: In practice, part-solid nodules cannot be defined as such until equal to or greater than 6 mm, and nodules less than 6 mm do not usually require follow-up. Persistent part-solid nodules with solid components equal to or greater than 6 mm should    be considered highly suspicious.      Note: These recommendations do not apply to lung cancer screening, patients with immunosuppression, or patients with known primary cancer.      Fleischner Society 2017 Guidelines for Management of Incidentally Detected Pulmonary Nodules in Adults        Assessment/Plan:  Justification for Admission Status  I anticipate this patient will require at least two midnights for appropriate medical management, necessitating inpatient admission because the patient has small bowel obstruction, will require n.p.o. status, general surgery consultation and likely operative treatment in addition to intravenous antibiotics.    * Small bowel obstruction (HCC)- (present on admission)  Assessment & Plan  CT imaging shows evidence for small bowel obstruction with an incarcerated umbilical hernia  containing inflamed loops of small bowel  I will start ceftriaxone and metronidazole, follow on cultures and sensitivities  Revised Cardiac Risk Index for Pre-Operative Risk score of:  2 points Class III Risk  The patient has a 10.1 % 30-day risk of death, MI, or cardiac arrest   General surgery, Dr. Sánchez consulted plan for OR    Pulmonary nodule- (present on admission)  Assessment & Plan  CT imaging shows groundglass opacity in the right lung base  Follow-up CT imaging is recommended at 6, 12 months to confirm persistence of then CT every 2 years until 5 years    Cardiac pacemaker in situ- (present on admission)  Assessment & Plan  Secondary to history of sick sinus syndrome.  Revised Cardiac Risk Index for Pre-Operative Risk score of:  2 points Class III Risk  The patient has a 10.1 % 30-day risk of death, MI, or cardiac arrest     Prostatic hyperplasia- (present on admission)  Assessment & Plan  Resume home tamsulosin    Hypothyroidism- (present on admission)  Assessment & Plan  Resume home levothyroxine    GERD (gastroesophageal reflux disease)- (present on admission)  Assessment & Plan  Tums as needed    VTE prophylaxis: SCDs/TEDs, plan for surgical procedure

## 2023-06-11 NOTE — OR NURSING
"2003: To PACU from OR via bed, sleeping, respirations spontaneous and non-labored via OPA.  Abdo semi-firm, rounded with c/d/I midline surgical dressing.    2007: Junior briefly, OPA d/manjit, returns to sleep  2015: Remains drowsy  2025: O2 d/manjit at pt request.  2030: Tolerates sips po water, now taking po cranberry juice. Describes current pain level as \"moderate\" so medicated with lower dose of oxycodone given pt's age. O2 resumed via n/c  2045: Dozing intermittently  2100: Abdo dressing now with small amount of  drainage, remains dry to touch. Pt repositions self for comfort.    2103: Describes current pain level as tolerable. Meets criteria to transfer to floor. Transferred on O2 tank @ FULL         "

## 2023-06-11 NOTE — DISCHARGE PLANNING
Received choice form @: 1554  Agency/Facility name: [facility]  Sent referral per choice form @: No referral has  been sent. Pending orders.

## 2023-06-11 NOTE — CONSULTS
Surgical History and Physical    Date: 6/10/2023    Requesting Physician: Dr. Manzanares    Consulting Physician: Darius Sánchez M.D.    Reason for consultation: Incarcerated ventral hernia    CC: Abdominal pain with nausea and vomiting    HPI: This is a 87 y.o. male who is presenting with worsening abdominal pain nausea and vomiting.  Patient states that he has had diarrhea with constipation.  The hernia has been longstanding with is never been incarcerated before.  He has a history of atrial fibrillation and COPD.  He has leukocytosis on presentation.  Past Medical History:   Diagnosis Date    Allergy     Anemia     Arrhythmia     Atrial Fib    Bronchitis 2019    Cancer (HCC) 2010    Prostate    COPD (chronic obstructive pulmonary disease) ([facility])     Macular degeneration     Muscle disorder     Psychiatric problem     depression and anxiety    Sinusitis, chronic     Strep throat        Past Surgical History:   Procedure Laterality Date    KNEE ARTHROSCOPY         Current Facility-Administered Medications   Medication Dose Route Frequency Provider Last Rate Last Admin    [MAR Hold] albuterol inhaler 2 Puff  2 Puff Inhalation Q6HRS PRN Kindra Garcia M.D.        [MAR Hold] ALPRAZolam (XANAX) tablet 1 mg  1 mg Oral HS PRN Kindra Garcia M.D.        [MAR Hold] escitalopram (Lexapro) tablet 10 mg  10 mg Oral DAILY Kindra Garcia M.D.        [MAR Hold] levothyroxine (SYNTHROID) tablet 88 mcg  88 mcg Oral AM ES Kindra Garcia M.D.        [MAR Hold] tamsulosin (FLOMAX) capsule 0.4 mg  0.4 mg Oral QHS Kindra Garcia M.D.        [MAR Hold] calcium carbonate (Tums) chewable tab 500 mg  500 mg Oral TID PRN Kindra Garcia M.D.        [MAR Hold] cefTRIAXone (Rocephin) 1,000 mg in  mL IVPB  1,000 mg Intravenous Q24HR Kindra Garcia M.D.        [MAR Hold] metroNIDAZOLE (Flagyl) IVPB 500 mg  500 mg Intravenous Q12HRS Kindra Garcia M.D.        [MAR Hold] acetaminophen (Tylenol) tablet 650 mg  650 mg Oral Q6HRS  PRN Kindra Garcia M.D.        [MAR Hold] senna-docusate (PERICOLACE or SENOKOT S) 8.6-50 MG per tablet 2 Tablet  2 Tablet Oral BID Kindra Garcia M.D.        And    [MAR Hold] magnesium hydroxide (MILK OF MAGNESIA) suspension 30 mL  30 mL Oral QDAY PRN Kindra Garcia M.D.        And    [MAR Hold] bisacodyl (DULCOLAX) suppository 10 mg  10 mg Rectal QDAY PRN Kindra Garcia M.D.        lactated ringers infusion   Intravenous Continuous Asegiovanni Garcia M.D.        [MAR Hold] Pharmacy Consult Request ...Pain Management Review 1 Each  1 Each Other PHARMACY TO DOSE Kindra Garcia M.D.        [MAR Hold] oxyCODONE immediate-release (ROXICODONE) tablet 2.5 mg  2.5 mg Oral Q3HRS PRN Kindra Garcia M.D.        Or    [MAR Hold] oxyCODONE immediate-release (ROXICODONE) tablet 5 mg  5 mg Oral Q3HRS PRN Kindra Garcia M.D.        Or    [MAR Hold] HYDROmorphone (Dilaudid) injection 0.25 mg  0.25 mg Intravenous Q3HRS PRN Kindra Garcia M.D.        [MAR Hold] ondansetron (ZOFRAN) syringe/vial injection 4 mg  4 mg Intravenous Q4HRS PRN Kindra Garcia M.D.        [MAR Hold] ondansetron (ZOFRAN ODT) dispertab 4 mg  4 mg Oral Q4HRS PRN Kindra Gacria M.D.        lactated ringers infusion   Intravenous Continuous Darius Sánchez M.D.           Social History     Socioeconomic History    Marital status: Single     Spouse name: Not on file    Number of children: Not on file    Years of education: Not on file    Highest education level: Not on file   Occupational History    Not on file   Tobacco Use    Smoking status: Never    Smokeless tobacco: Never   Vaping Use    Vaping Use: Never used   Substance and Sexual Activity    Alcohol use: No    Drug use: Yes     Types: Oral     Comment: marijuan tincture  for sleep nightly    Sexual activity: Never   Other Topics Concern    Not on file   Social History Narrative    Not on file     Social Determinants of Health     Financial Resource Strain: Not on file   Food  "Insecurity: Not on file   Transportation Needs: Not on file   Physical Activity: Not on file   Stress: Not on file   Social Connections: Not on file   Intimate Partner Violence: Not on file   Housing Stability: Not on file       Family History   Problem Relation Age of Onset    Cancer Mother     Heart Disease Father     Cancer Father     Stroke Sister        Allergies:  Augmentin [amoxicillin-pot clavulanate]    Review of Systems:  Constitutional: Negative for fever, chills or weight loss  HENT: Negative for nosebleeds   Eyes: Negative for changes in vision or photophobia  Respiratory: Positive for cough, negative for shortness of breath or wheezing  Cardiovascular: Negative for chest pain or palpitations  Gastrointestinal: Positive for nausea, vomiting, diarrhea, negative for blood in stool and melena.   Genitourinary: Negative for dysuria or urinary incontinence   Musculoskeletal: Negative for back pain and joint pain.   Skin: Negative for itching and rash.  Neurological: Negative for dizziness, lightheadedness or loss of consciousness  Endo/Heme/Allergies: Does not bruise/bleed easily.   Psychiatric/Behavioral: Negative for substance abuse. The patient is not nervous/anxious and does not have insomnia.    Physical Exam:  /75   Pulse 74   Temp 36.1 °C (96.9 °F) (Temporal)   Resp 20   Ht 1.753 m (5' 9\")   Wt 74.3 kg (163 lb 12.8 oz)   SpO2 98%     Constitutional: oriented to person, place, and time.  appears well-developed and well-nourished. No distress.   Head: Normocephalic and atraumatic.   Neck: Normal range of motion. Neck supple. No JVD present. No tracheal deviation present. No thyromegaly present.   Cardiovascular: Normal rate, regular rhythm, normal heart sounds and intact distal pulses.    Pulmonary/Chest: Normal respiratory effort. No stridor. No respiratory distress.   Abdominal: Patient has an obvious incarcerated ventral hernia with overlying skin changes and marked tenderness to " palpation worrisome for ischemia  Musculoskeletal: Normal range of motion.  exhibits no edema and no tenderness.   Neurological: Coordination normal. Without tremors.  Skin: Skin is warm and dry. No rash noted. he is not diaphoretic. No erythema. No pallor.   Psychiatric: normal mood and affect.  Behavior is normal.       Labs:  Recent Labs     06/10/23  1520   WBC 12.4*   RBC 4.58*   HEMOGLOBIN 14.1   HEMATOCRIT 42.3   MCV 92.4   MCH 30.8   MCHC 33.3   RDW 45.9   PLATELETCT 229   MPV 12.9     Recent Labs     06/10/23  1520   SODIUM 135   POTASSIUM 4.4   CHLORIDE 103   CO2 18*   GLUCOSE 126*   BUN 19   CREATININE 0.92   CALCIUM 9.8     Recent Labs     06/10/23  1750   INR 1.17*     Recent Labs     06/10/23  1520 06/10/23  1750   ASTSGOT 28  --    ALTSGPT 14  --    TBILIRUBIN 0.9  --    ALKPHOSPHAT 85  --    GLOBULIN 2.6  --    INR  --  1.17*         Radiology:  CT-ABDOMEN-PELVIS WITH   Final Result      1.  There is a small bowel obstruction with an incarcerated umbilical hernia containing an inflamed loop of small bowel within the hernia. There are normal caliber distal ileal loops.   2.  There is diverticulosis without diverticulitis.   3.  Nonspecific groundglass opacity right lung base. Recommend follow-up per Fleischner guidelines.      Fleischner Society pulmonary nodule recommendations:   Ground Glass: CT at 6-12 months to confirm persistence, then CT every 2 years until 5 years      Part Solid: CT at 3-6 months to confirm persistence. If unchanged and solid component remains less than 6 mm, annual CT should be performed for 5 years.      Comments: In practice, part-solid nodules cannot be defined as such until equal to or greater than 6 mm, and nodules less than 6 mm do not usually require follow-up. Persistent part-solid nodules with solid components equal to or greater than 6 mm should    be considered highly suspicious.      Note: These recommendations do not apply to lung cancer screening, patients with  immunosuppression, or patients with known primary cancer.      Fleischner Society 2017 Guidelines for Management of Incidentally Detected Pulmonary Nodules in Adults          Assessment: This is a 87 y.o.     Active Hospital Problems    Diagnosis     Cardiac pacemaker in situ [Z95.0]      Priority: High     September 2021: Med[employer]onic Sophia S  MRI [id number] implanted by Dr. Fulton.      GERD (gastroesophageal reflux disease) [K21.9]      Priority: Medium    Small bowel obstruction (HCC) [K56.609]     Prostatic hyperplasia [N40.0]     Pulmonary nodule [R91.1]     Hypothyroidism [E03.9]        Plan: 87-year-old male with ventral hernia with incarcerated versus strangulated bowel and obstruction  Given the above presentation, the patient will be taken to the operating room for exploratory laparotomy with bowel resection and hernia repair for incarceration versus strangulation. The surgical plan was discussed the the patient and available family. Potential complications were discussed in detail and include but are not limited to infection, bleeding, damage to adjacent tissues and organs, anesthetic complications . Additional possible complications specifically related to the planned procedure included in the discussion were related to leak,failure to localize lesion, bowel, bladder, or vascular injury, abscess, fistula, hernia or need for permanent or temporary ostomy.    Questions were elicited and answered to the patient's and available family's satisfaction. The patient understands the rationale for surgery and agrees to proceed.  Operative consent was obtained.  According to the American College of Surgeons NSQIP surgical risk report the patient is  average in terms of risk for any or serious complications including respiratory, cardiac, death, return to OR for discharge to a nursing or rehabilitation facility.        Darius Sánchez MD PhD  [facility]  Colon and Rectal Surgeon  [phone]

## 2023-06-11 NOTE — DISCHARGE PLANNING
Met with pt and son was at bedside. Pt gave consent for CM to ask questions while son is at bedside. Pt said he is independent with ADLs and IADLs. He drives. He lives alone and able to care for himself.    PCP is Mohsen Flores   Uses [facility] [address]      Care Transition Team Assessment    Information Source  Orientation Level: Oriented X4  Information Given By: Patient  Who is responsible for making decisions for patient? : Patient    Readmission Evaluation  Is this a readmission?: No    Elopement Risk  Legal Hold: No    Interdisciplinary Discharge Planning  Does Admitting Nurse Feel This Could be a Complex Discharge?: No  Primary Care Physician: Dr Mohsen Flores  Lives with - Patient's Self Care Capacity: Alone and Able to Care For Self  Support Systems: Children, Spouse / Significant Other  Housing / Facility: 1 Story Apartment / Condo  Do You Take your Prescribed Medications Regularly: Yes  Able to Return to Previous ADL's: Yes  Mobility Issues: No  Prior Services: Home-Independent  Patient Prefers to be Discharged to:: Home  Assistance Needed: No  Durable Medical Equipment: Not Applicable    Discharge Preparedness  What is your plan after discharge?: Home with help  What are your discharge supports?: Child, Partner  Prior Functional Level: Ambulatory, Drives Self, Independent with Activities of Daily Living, Independent with Medication Management  Difficulity with ADLs: None  Difficulity with IADLs: None    Functional Assesment  Prior Functional Level: Ambulatory, Drives Self, Independent with Activities of Daily Living, Independent with Medication Management    Finances  Financial Barriers to Discharge: No  Prescription Coverage: Yes    Vision / Hearing Impairment  Vision Impairment : Yes  Hearing Impairment : No    Values / Beliefs / Concerns  Values / Beliefs Concerns : No    Advance Directive  Advance Directive?: None    Domestic Abuse  Have you ever been the victim of abuse or violence?:  No    Discharge Risks or Barriers  Discharge risks or barriers?: No  Patient risk factors: Other (comment)    Anticipated Discharge Information  Discharge Disposition: Discharged to home/self care (01)

## 2023-06-11 NOTE — ED NOTES
Rounded on pt. POC reviewed. Pt verbalizes understanding. Awilda in lowest position and locked, call light in place. All needs met at this time.

## 2023-06-11 NOTE — PROGRESS NOTES
"Hospital Medicine Daily Progress Note    Date of Service  6/11/2023    Chief Complaint  David Darling is a 87 y.o. male admitted 6/10/2023 with nausea and vomiting    Hospital Course  Per notes, \"87 y.o. male with a past medical history of prostate cancer, sick sinus syndrome s/p pacemaker placement and hypothyroidism who presented 6/10/2023 with nausea vomiting, chills and abdominal pain.  Patient reports that he has not been feeling well since yesterday.  He has been having progressively worsening abdominal pain.  Pain is constant and has been getting worse.  He also has abdominal distention.  In addition he feels weak and having chills.  He does have nausea.  Denies noticing any blood in his vomitus.\"    Interval Problem Update  Patient doing well, tolerating breakfast.  No pain.  No BM.    Encourage ambulation.    I have discussed this patient's plan of care and discharge plan at IDT rounds today with Case Management, Nursing, Nursing leadership, and other members of the IDT team.    Consultants/Specialty  general surgery    Code Status  Full Code    Disposition  The patient is not medically cleared for discharge to home or a post-acute facility.  Anticipate discharge to: home with close outpatient follow-up    I have placed the appropriate orders for post-discharge needs.    Review of Systems  Review of Systems   Constitutional:  Positive for fever.   Gastrointestinal:  Positive for abdominal pain and constipation.   All other systems reviewed and are negative.       Physical Exam  Temp:  [36.1 °C (96.9 °F)-36.9 °C (98.4 °F)] 36.9 °C (98.4 °F)  Pulse:  [65-80] 76  Resp:  [12-20] 16  BP: (118-163)/(65-85) 118/66  SpO2:  [90 %-100 %] 91 %    Physical Exam  Vitals and nursing note reviewed.   Constitutional:       Appearance: Normal appearance. He is obese.   Cardiovascular:      Rate and Rhythm: Normal rate and regular rhythm.      Pulses: Normal pulses.      Heart sounds: Normal heart sounds.   Pulmonary:      " Effort: Pulmonary effort is normal.      Breath sounds: Normal breath sounds.   Abdominal:      General: Abdomen is flat. Bowel sounds are normal.      Palpations: Abdomen is soft.      Tenderness: There is abdominal tenderness.   Musculoskeletal:      Right lower leg: No edema.      Left lower leg: No edema.   Neurological:      General: No focal deficit present.      Mental Status: He is alert and oriented to person, place, and time. Mental status is at baseline.         Fluids    Intake/Output Summary (Last 24 hours) at 6/11/2023 1143  Last data filed at 6/11/2023 1035  Gross per 24 hour   Intake 2157.62 ml   Output 530 ml   Net 1627.62 ml       Laboratory  Recent Labs     06/10/23  1520 06/11/23  0132   WBC 12.4* 19.8*   RBC 4.58* 4.14*   HEMOGLOBIN 14.1 12.9*   HEMATOCRIT 42.3 38.1*   MCV 92.4 92.0   MCH 30.8 31.2   MCHC 33.3 33.9   RDW 45.9 47.0   PLATELETCT 229 263   MPV 12.9 12.0     Recent Labs     06/10/23  1520 06/11/23  0132   SODIUM 135 138   POTASSIUM 4.4 4.1   CHLORIDE 103 106   CO2 18* 20   GLUCOSE 126* 146*   BUN 19 19   CREATININE 0.92 0.88   CALCIUM 9.8 8.9     Recent Labs     06/10/23  1750   INR 1.17*               Imaging  CT-ABDOMEN-PELVIS WITH   Final Result      1.  There is a small bowel obstruction with an incarcerated umbilical hernia containing an inflamed loop of small bowel within the hernia. There are normal caliber distal ileal loops.   2.  There is diverticulosis without diverticulitis.   3.  Nonspecific groundglass opacity right lung base. Recommend follow-up per Fleischner guidelines.      Fleischner Society pulmonary nodule recommendations:   Ground Glass: CT at 6-12 months to confirm persistence, then CT every 2 years until 5 years      Part Solid: CT at 3-6 months to confirm persistence. If unchanged and solid component remains less than 6 mm, annual CT should be performed for 5 years.      Comments: In practice, part-solid nodules cannot be defined as such until equal to or  greater than 6 mm, and nodules less than 6 mm do not usually require follow-up. Persistent part-solid nodules with solid components equal to or greater than 6 mm should    be considered highly suspicious.      Note: These recommendations do not apply to lung cancer screening, patients with immunosuppression, or patients with known primary cancer.      Fleischner Society 2017 Guidelines for Management of Incidentally Detected Pulmonary Nodules in Adults           Assessment/Plan  * Small bowel obstruction (HCC)- (present on admission)  Assessment & Plan  CT imaging shows evidence for small bowel obstruction with an incarcerated umbilical hernia containing inflamed loops of small bowel  I will start ceftriaxone and metronidazole, follow on cultures and sensitivities  Revised Cardiac Risk Index for Pre-Operative Risk score of:  2 points Class III Risk  The patient has a 10.1 % 30-day risk of death, MI, or cardiac arrest   General surgery, Dr. Sánchez consulted, follow recommendations  Status post exploratory laparotomy: 8 cm ventral hernia with strangulated small bowel with obstruction, small bowel resection and ventral hernia repair.  Diet per surgery  Multimodal pain control    Pulmonary nodule- (present on admission)  Assessment & Plan  CT imaging shows groundglass opacity in the right lung base  Follow-up CT imaging is recommended at 6, 12 months to confirm persistence of then CT every 2 years until 5 years    Prostatic hyperplasia- (present on admission)  Assessment & Plan  Continue home tamsulosin    Cardiac pacemaker in situ- (present on admission)  Assessment & Plan  Secondary to history of sick sinus syndrome.  Revised Cardiac Risk Index for Pre-Operative Risk score of:  2 points Class III Risk  The patient has a 10.1 % 30-day risk of death, MI, or cardiac arrest     Hypothyroidism- (present on admission)  Assessment & Plan  Continue home levothyroxine    GERD (gastroesophageal reflux disease)- (present on  admission)  Assessment & Plan  Tums as needed         VTE prophylaxis: SCDs/TEDs    I have performed a physical exam and reviewed and updated ROS and Plan today (6/11/2023). In review of yesterday's note (6/10/2023), there are no changes except as documented above.      Total time spent with patient over 52 minutes.  This included my review with nursing and ancillary staff, review of records, face to face interview, physical examination; my review of lab results (CBC, chemistry panel), imaging review (CXR) and ECG.   In addition, counseling patient and speaking with consultants.

## 2023-06-11 NOTE — ASSESSMENT & PLAN NOTE
CT imaging shows groundglass opacity in the right lung base  Follow-up CT imaging is recommended at 6, 12 months to confirm persistence of then CT every 2 years until 5 years

## 2023-06-11 NOTE — CARE PLAN
The patient is Stable - Low risk of patient condition declining or worsening    Shift Goals  Clinical Goals: Monitor post-op vital signs. Pt will be able to control pain and report pain scale less than 5/10. Pt will be able to void and ambulate within the shift.  Patient Goals: pain control and sleep  Family Goals: updated on plan of care    Progress made toward(s) clinical / shift goals:  Pt did not require prn pain medication throughout the shift. Pt denies pain at this time. Pt seen sleeping comfortably in between rounds. Pt able to void and ambulate within the shift.  Pt on wean to room air with saturation at 97%. Hourly rounding in progress.    Patient is not progressing towards the following goals: n/a

## 2023-06-11 NOTE — ANESTHESIA POSTPROCEDURE EVALUATION
Patient: David Darling    Procedure Summary     Date: 06/10/23 Room / Location:  OR  / SURGERY [facility]    Anesthesia Start: 1914 Anesthesia Stop: 2005    Procedure: LAPAROTOMY, EXPLORATORY - INCARCERATED HERNIA, POSSIBLE BOWEL RESECTION (Abdomen) Diagnosis: (strangulated ventral hernia with necrotic small bowel)    Surgeons: Darius Sánchez M.D. Responsible Provider: Thomas Matson M.D.    Anesthesia Type: general ASA Status: 3 - Emergent          Final Anesthesia Type: general  Last vitals  BP   Blood Pressure : (!) 141/68    Temp   36.4 °C (97.5 °F)    Pulse   69   Resp   16    SpO2   97 %      Anesthesia Post Evaluation    Patient location during evaluation: PACU  Patient participation: complete - patient participated  Level of consciousness: awake and alert    Airway patency: patent  Anesthetic complications: no  Cardiovascular status: hemodynamically stable  Respiratory status: face mask    PONV: none          No notable events documented.     Nurse Pain Score: 3 (NPRS)

## 2023-06-11 NOTE — ASSESSMENT & PLAN NOTE
Secondary to history of sick sinus syndrome.  Revised Cardiac Risk Index for Pre-Operative Risk score of:  2 points Class III Risk  The patient has a 10.1 % 30-day risk of death, MI, or cardiac arrest

## 2023-06-11 NOTE — ASSESSMENT & PLAN NOTE
CT imaging shows evidence for small bowel obstruction with an incarcerated umbilical hernia containing inflamed loops of small bowel  I will start ceftriaxone and metronidazole, follow on cultures and sensitivities  Revised Cardiac Risk Index for Pre-Operative Risk score of:  2 points Class III Risk  The patient has a 10.1 % 30-day risk of death, MI, or cardiac arrest   General surgery, Dr. Sánchez consulted, follow recommendations  Status post exploratory laparotomy: 8 cm ventral hernia with strangulated small bowel with obstruction, small bowel resection and ventral hernia repair.  Diet has been progressed to full as patient was tolerating well yesterday, no longer tolerated diet, will progress to liquid diet.  Initially discussed possibility of discharge with surgery, however patient seems slightly worse than yesterday so we will monitor.  Multimodal pain control

## 2023-06-12 PROBLEM — D64.89 OTHER SPECIFIED ANEMIAS: Status: ACTIVE | Noted: 2023-06-12

## 2023-06-12 LAB
ANION GAP SERPL CALC-SCNC: 9 MMOL/L (ref 7–16)
BUN SERPL-MCNC: 21 MG/DL (ref 8–22)
CALCIUM SERPL-MCNC: 8.6 MG/DL (ref 8.4–10.2)
CHLORIDE SERPL-SCNC: 102 MMOL/L (ref 96–112)
CO2 SERPL-SCNC: 21 MMOL/L (ref 20–33)
CREAT SERPL-MCNC: 0.84 MG/DL (ref 0.5–1.4)
ERYTHROCYTE [DISTWIDTH] IN BLOOD BY AUTOMATED COUNT: 47.2 FL (ref 35.9–50)
GFR SERPLBLD CREATININE-BSD FMLA CKD-EPI: 84 ML/MIN/1.73 M 2
GLUCOSE SERPL-MCNC: 104 MG/DL (ref 65–99)
HCT VFR BLD AUTO: 33.5 % (ref 42–52)
HGB BLD-MCNC: 11.2 G/DL (ref 14–18)
MCH RBC QN AUTO: 30.9 PG (ref 27–33)
MCHC RBC AUTO-ENTMCNC: 33.4 G/DL (ref 32.3–36.5)
MCV RBC AUTO: 92.3 FL (ref 81.4–97.8)
PLATELET # BLD AUTO: 213 K/UL (ref 164–446)
PMV BLD AUTO: 12.2 FL (ref 9–12.9)
POTASSIUM SERPL-SCNC: 3.7 MMOL/L (ref 3.6–5.5)
RBC # BLD AUTO: 3.63 M/UL (ref 4.7–6.1)
SODIUM SERPL-SCNC: 132 MMOL/L (ref 135–145)
WBC # BLD AUTO: 9.2 K/UL (ref 4.8–10.8)

## 2023-06-12 PROCEDURE — 94664 DEMO&/EVAL PT USE INHALER: CPT

## 2023-06-12 PROCEDURE — 700102 HCHG RX REV CODE 250 W/ 637 OVERRIDE(OP): Performed by: HOSPITALIST

## 2023-06-12 PROCEDURE — 36415 COLL VENOUS BLD VENIPUNCTURE: CPT

## 2023-06-12 PROCEDURE — A9270 NON-COVERED ITEM OR SERVICE: HCPCS | Performed by: INTERNAL MEDICINE

## 2023-06-12 PROCEDURE — 85027 COMPLETE CBC AUTOMATED: CPT

## 2023-06-12 PROCEDURE — 770001 HCHG ROOM/CARE - MED/SURG/GYN PRIV*

## 2023-06-12 PROCEDURE — 700102 HCHG RX REV CODE 250 W/ 637 OVERRIDE(OP): Performed by: INTERNAL MEDICINE

## 2023-06-12 PROCEDURE — 80048 BASIC METABOLIC PNL TOTAL CA: CPT

## 2023-06-12 PROCEDURE — 97162 PT EVAL MOD COMPLEX 30 MIN: CPT

## 2023-06-12 PROCEDURE — A9270 NON-COVERED ITEM OR SERVICE: HCPCS | Performed by: HOSPITALIST

## 2023-06-12 PROCEDURE — 700111 HCHG RX REV CODE 636 W/ 250 OVERRIDE (IP): Performed by: HOSPITALIST

## 2023-06-12 PROCEDURE — 700101 HCHG RX REV CODE 250: Performed by: HOSPITALIST

## 2023-06-12 PROCEDURE — 94760 N-INVAS EAR/PLS OXIMETRY 1: CPT

## 2023-06-12 PROCEDURE — 700105 HCHG RX REV CODE 258: Performed by: HOSPITALIST

## 2023-06-12 PROCEDURE — 99232 SBSQ HOSP IP/OBS MODERATE 35: CPT | Performed by: INTERNAL MEDICINE

## 2023-06-12 RX ORDER — FEXOFENADINE HCL 60 MG/1
60 TABLET, FILM COATED ORAL DAILY
Status: DISCONTINUED | OUTPATIENT
Start: 2023-06-13 | End: 2023-06-13 | Stop reason: HOSPADM

## 2023-06-12 RX ORDER — METRONIDAZOLE 500 MG/1
500 TABLET ORAL EVERY 12 HOURS
Status: DISCONTINUED | OUTPATIENT
Start: 2023-06-12 | End: 2023-06-13

## 2023-06-12 RX ADMIN — MAGNESIUM HYDROXIDE 30 ML: 400 SUSPENSION ORAL at 04:53

## 2023-06-12 RX ADMIN — OXYCODONE 5 MG: 5 TABLET ORAL at 04:53

## 2023-06-12 RX ADMIN — OXYCODONE 5 MG: 5 TABLET ORAL at 07:58

## 2023-06-12 RX ADMIN — ESCITALOPRAM OXALATE 10 MG: 10 TABLET ORAL at 04:53

## 2023-06-12 RX ADMIN — METRONIDAZOLE 500 MG: 5 INJECTION, SOLUTION INTRAVENOUS at 04:51

## 2023-06-12 RX ADMIN — ALPRAZOLAM 1 MG: 0.5 TABLET ORAL at 23:30

## 2023-06-12 RX ADMIN — ACETAMINOPHEN 650 MG: 325 TABLET ORAL at 18:12

## 2023-06-12 RX ADMIN — METRONIDAZOLE 500 MG: 500 TABLET ORAL at 17:55

## 2023-06-12 RX ADMIN — SODIUM CHLORIDE, POTASSIUM CHLORIDE, SODIUM LACTATE AND CALCIUM CHLORIDE: 600; 310; 30; 20 INJECTION, SOLUTION INTRAVENOUS at 14:16

## 2023-06-12 RX ADMIN — LEVOTHYROXINE SODIUM 88 MCG: 88 TABLET ORAL at 04:53

## 2023-06-12 RX ADMIN — BENZONATATE 100 MG: 100 CAPSULE ORAL at 18:13

## 2023-06-12 RX ADMIN — OXYCODONE 5 MG: 5 TABLET ORAL at 01:19

## 2023-06-12 RX ADMIN — SENNOSIDES AND DOCUSATE SODIUM 2 TABLET: 50; 8.6 TABLET ORAL at 04:53

## 2023-06-12 RX ADMIN — CEFTRIAXONE SODIUM 1000 MG: 1 INJECTION, POWDER, FOR SOLUTION INTRAMUSCULAR; INTRAVENOUS at 18:16

## 2023-06-12 RX ADMIN — TAMSULOSIN HYDROCHLORIDE 0.4 MG: 0.4 CAPSULE ORAL at 20:26

## 2023-06-12 RX ADMIN — SODIUM CHLORIDE, POTASSIUM CHLORIDE, SODIUM LACTATE AND CALCIUM CHLORIDE: 600; 310; 30; 20 INJECTION, SOLUTION INTRAVENOUS at 01:00

## 2023-06-12 RX ADMIN — SENNOSIDES AND DOCUSATE SODIUM 2 TABLET: 50; 8.6 TABLET ORAL at 17:55

## 2023-06-12 ASSESSMENT — PAIN DESCRIPTION - PAIN TYPE
TYPE: ACUTE PAIN
TYPE: ACUTE PAIN
TYPE: SURGICAL PAIN
TYPE: ACUTE PAIN
TYPE: SURGICAL PAIN
TYPE: SURGICAL PAIN

## 2023-06-12 ASSESSMENT — COGNITIVE AND FUNCTIONAL STATUS - GENERAL
CLIMB 3 TO 5 STEPS WITH RAILING: A LITTLE
TURNING FROM BACK TO SIDE WHILE IN FLAT BAD: A LOT
SUGGESTED CMS G CODE MODIFIER MOBILITY: CK
WALKING IN HOSPITAL ROOM: A LITTLE
MOVING TO AND FROM BED TO CHAIR: UNABLE
MOVING FROM LYING ON BACK TO SITTING ON SIDE OF FLAT BED: A LITTLE
MOBILITY SCORE: 15
STANDING UP FROM CHAIR USING ARMS: A LITTLE

## 2023-06-12 ASSESSMENT — PATIENT HEALTH QUESTIONNAIRE - PHQ9
SUM OF ALL RESPONSES TO PHQ9 QUESTIONS 1 AND 2: 0
1. LITTLE INTEREST OR PLEASURE IN DOING THINGS: NOT AT ALL
2. FEELING DOWN, DEPRESSED, IRRITABLE, OR HOPELESS: NOT AT ALL

## 2023-06-12 ASSESSMENT — GAIT ASSESSMENTS
ASSISTIVE DEVICE: FRONT WHEEL WALKER
DEVIATION: DECREASED BASE OF SUPPORT;BRADYKINETIC
DISTANCE (FEET): 120
GAIT LEVEL OF ASSIST: STANDBY ASSIST

## 2023-06-12 ASSESSMENT — ENCOUNTER SYMPTOMS
ABDOMINAL PAIN: 1
FEVER: 1
CONSTIPATION: 1

## 2023-06-12 ASSESSMENT — FIBROSIS 4 INDEX: FIB4 SCORE: 2.55

## 2023-06-12 NOTE — PROGRESS NOTES
"Hospital Medicine Daily Progress Note    Date of Service  6/12/2023    Chief Complaint  David Darling is a 87 y.o. male admitted 6/10/2023 with nausea and vomiting    Hospital Course  Per notes, \"87 y.o. male with a past medical history of prostate cancer, sick sinus syndrome s/p pacemaker placement and hypothyroidism who presented 6/10/2023 with nausea vomiting, chills and abdominal pain.  Patient reports that he has not been feeling well since yesterday.  He has been having progressively worsening abdominal pain.  Pain is constant and has been getting worse.  He also has abdominal distention.  In addition he feels weak and having chills.  He does have nausea.  Denies noticing any blood in his vomitus.\"    Interval Problem Update  Initially plan was for discharge today-discussed with surgery.    However, on exam patient feeling worse, no longer tolerating diet, no BM, +flatulence.  Encourage ambulation, patient is now tolerating full diet so will regress to liquid. Multimodal pain control.     I have discussed this patient's plan of care and discharge plan at IDT rounds today with Case Management, Nursing, Nursing leadership, and other members of the IDT team.    Consultants/Specialty  general surgery    Code Status  Full Code    Disposition  The patient is not medically cleared for discharge to home or a post-acute facility.  Anticipate discharge to: home with close outpatient follow-up    I have placed the appropriate orders for post-discharge needs.    Review of Systems  Review of Systems   Constitutional:  Positive for fever.   Gastrointestinal:  Positive for abdominal pain and constipation.   All other systems reviewed and are negative.       Physical Exam  Temp:  [36.7 °C (98 °F)-37.2 °C (98.9 °F)] 36.7 °C (98.1 °F)  Pulse:  [65-74] 67  Resp:  [15-18] 17  BP: (125-128)/(64-76) 125/76  SpO2:  [90 %-95 %] 92 %    Physical Exam  Vitals and nursing note reviewed.   Constitutional:       Appearance: Normal " appearance. He is obese.   Cardiovascular:      Rate and Rhythm: Normal rate and regular rhythm.      Pulses: Normal pulses.      Heart sounds: Normal heart sounds.   Pulmonary:      Effort: Pulmonary effort is normal.      Breath sounds: Normal breath sounds.   Abdominal:      General: Abdomen is flat. Bowel sounds are normal. There is distension.      Palpations: Abdomen is soft.      Tenderness: There is abdominal tenderness.   Musculoskeletal:      Right lower leg: No edema.      Left lower leg: No edema.   Neurological:      General: No focal deficit present.      Mental Status: He is alert and oriented to person, place, and time. Mental status is at baseline.         Fluids    Intake/Output Summary (Last 24 hours) at 6/12/2023 1241  Last data filed at 6/12/2023 1000  Gross per 24 hour   Intake 2192.81 ml   Output 1000 ml   Net 1192.81 ml       Laboratory  Recent Labs     06/10/23  1520 06/11/23  0132 06/12/23  0126   WBC 12.4* 19.8* 9.2   RBC 4.58* 4.14* 3.63*   HEMOGLOBIN 14.1 12.9* 11.2*   HEMATOCRIT 42.3 38.1* 33.5*   MCV 92.4 92.0 92.3   MCH 30.8 31.2 30.9   MCHC 33.3 33.9 33.4   RDW 45.9 47.0 47.2   PLATELETCT 229 263 213   MPV 12.9 12.0 12.2     Recent Labs     06/10/23  1520 06/11/23  0132 06/12/23  0126   SODIUM 135 138 132*   POTASSIUM 4.4 4.1 3.7   CHLORIDE 103 106 102   CO2 18* 20 21   GLUCOSE 126* 146* 104*   BUN 19 19 21   CREATININE 0.92 0.88 0.84   CALCIUM 9.8 8.9 8.6     Recent Labs     06/10/23  1750   INR 1.17*               Imaging  CT-ABDOMEN-PELVIS WITH   Final Result      1.  There is a small bowel obstruction with an incarcerated umbilical hernia containing an inflamed loop of small bowel within the hernia. There are normal caliber distal ileal loops.   2.  There is diverticulosis without diverticulitis.   3.  Nonspecific groundglass opacity right lung base. Recommend follow-up per Fleischner guidelines.      Fleischner Society pulmonary nodule recommendations:   Ground Glass: CT at 6-12  months to confirm persistence, then CT every 2 years until 5 years      Part Solid: CT at 3-6 months to confirm persistence. If unchanged and solid component remains less than 6 mm, annual CT should be performed for 5 years.      Comments: In practice, part-solid nodules cannot be defined as such until equal to or greater than 6 mm, and nodules less than 6 mm do not usually require follow-up. Persistent part-solid nodules with solid components equal to or greater than 6 mm should    be considered highly suspicious.      Note: These recommendations do not apply to lung cancer screening, patients with immunosuppression, or patients with known primary cancer.      Fleischner Society 2017 Guidelines for Management of Incidentally Detected Pulmonary Nodules in Adults           Assessment/Plan  * Small bowel obstruction (HCC)- (present on admission)  Assessment & Plan  CT imaging shows evidence for small bowel obstruction with an incarcerated umbilical hernia containing inflamed loops of small bowel  I will start ceftriaxone and metronidazole, follow on cultures and sensitivities  Revised Cardiac Risk Index for Pre-Operative Risk score of:  2 points Class III Risk  The patient has a 10.1 % 30-day risk of death, MI, or cardiac arrest   General surgery, Dr. Sánchez consulted, follow recommendations  Status post exploratory laparotomy: 8 cm ventral hernia with strangulated small bowel with obstruction, small bowel resection and ventral hernia repair.  Diet has been progressed to full as patient was tolerating well yesterday, no longer tolerated diet, will progress to liquid diet.  Initially discussed possibility of discharge with surgery, however patient seems slightly worse than yesterday so we will monitor.  Multimodal pain control    Other specified anemias  Assessment & Plan  Likely postoperative and volume, no signs of acute bleed but hemoglobin has dropped slightly.   Continue to monitor with daily labs  Transfuse for  hemoglobin less than 7    Pulmonary nodule- (present on admission)  Assessment & Plan  CT imaging shows groundglass opacity in the right lung base  Follow-up CT imaging is recommended at 6, 12 months to confirm persistence of then CT every 2 years until 5 years    Prostatic hyperplasia- (present on admission)  Assessment & Plan  Continue home tamsulosin    Cardiac pacemaker in situ- (present on admission)  Assessment & Plan  Secondary to history of sick sinus syndrome.  Revised Cardiac Risk Index for Pre-Operative Risk score of:  2 points Class III Risk  The patient has a 10.1 % 30-day risk of death, MI, or cardiac arrest     Hypothyroidism- (present on admission)  Assessment & Plan  Continue home levothyroxine    GERD (gastroesophageal reflux disease)- (present on admission)  Assessment & Plan  Tums as needed         VTE prophylaxis: SCDs/TEDs    I have performed a physical exam and reviewed and updated ROS and Plan today (6/12/2023). In review of yesterday's note (6/11/2023), there are no changes except as documented above.

## 2023-06-12 NOTE — PROGRESS NOTES
Bedside report received from day RN. Assumed care of patient. Daily plan of care discussed. Hourly rounding in place.

## 2023-06-12 NOTE — CARE PLAN
Problem: Pain - Standard  Goal: Alleviation of pain or a reduction in pain to the patient’s comfort goal  Outcome: Progressing     Problem: Fall Risk  Goal: Patient will remain free from falls  Outcome: Progressing     Problem: Early Mobilization - Post Surgery  Goal: Early mobilization post surgery  Outcome: Progressing     The patient is Stable - Low risk of patient condition declining or worsening    Shift Goals  Clinical Goals: Pain control < 5/10 post intervention  Patient Goals: pain control  Family Goals: NA    Progress made toward(s) clinical / shift goals:  Pt reports satisfactory pain control under currently ordered pain medication regimen. Pt able to ambulate safely with 1x RN or CNA assist with FWW. Pending PT evaluation.    Patient is not progressing towards the following goals: n/a

## 2023-06-12 NOTE — ASSESSMENT & PLAN NOTE
Likely postoperative and volume, no signs of acute bleed but hemoglobin has dropped slightly.   Continue to monitor with daily labs  Transfuse for hemoglobin less than 7

## 2023-06-12 NOTE — CARE PLAN
The patient is Stable - Low risk of patient condition declining or worsening    Shift Goals  Clinical Goals: Pt will report pain less than 5/10. Pt will be able to sleep comfortably during the shift.  Patient Goals: pain control and sleep  Family Goals: updated plan of care    Progress made toward(s) clinical / shift goals:  Pt required 2 prn pain medication during the shift. Pt states relief post intervention. Pt seen sleeping comfortably in between rounds. Hourly rounding in progress.      Patient is not progressing towards the following goals: n/a

## 2023-06-12 NOTE — DISCHARGE PLANNING
HTH/SCP TCN chart review completed. Collaborated with REYNA Prinec prior to meeting with the pt. The most current review of medical record, knowledge of pt's PLOF and social support, LACE+ score of 77, 6 clicks scores of 23 ADL's and 21 mobility were considered.      Pt seen at bedside. Introduced TCN program. Provided education regarding post acute levels of care. Discussed HTH/SCP plan benefits (Meds to Beds, medical uber and [facility] transitional care). Pt verbalizes understanding.     Patient states he lives in a [facility] ([facility]) in a first floor apartment and was independent with ADL's, IADL's, mobility (no AD) and driving at baseline.  He denies any concerns with food, housing or transportation.  He is currently on RA and has 2 sons that live nearby and can assist as needed.  He states he is currently using a FWW for ambulation secondary to pain s/p lapar hernia repair on 6/10/23.  He states he is not at his baseline level of function.      Choice proactively obtained for MADAN YARBROUGH(AD), faxed to [facility] and given to REYNA. TCN will continue to follow and collaborate with discharge planning team as additional post acute needs arise. Thank you.     Completed today:  Choice obtained: MADAN YARBROUGH (AD) on 6/11/23 if recommended by therapy/physician.    [facility] referral (Y). Sent on 6/11/23.

## 2023-06-12 NOTE — PROGRESS NOTES
Bedside report received from night RN. Assumed care of patient. Alert and oriented x4, wakes easily to voice. On RA in no acute respiratory distress. Daily plan of care discussed. Pt complains of 7-8/10 pain, medicated per MAR. Denies nausea and vomiting at this time. Fall and safety precautions in place. Hourly rounding ongoing.     1214 Endorsed Pt to Will, report given.

## 2023-06-12 NOTE — PROGRESS NOTES
Received report from day shift nurse, Pam FINLEY. Assumed pt care at 1915.   Pt is A&Ox4, sitting on chair and assisted to bed on comfortable position. Pt on room air; no signs of SOB/respiratory distress. Labs noted. Pt denies pain at this moment. Needs attended well. Fall precautions in place. Bed at lowest position. Call light and personal belongings within reach. Bed alarm on and maintained  Hourly rounding in progress.    2215 Pt IV pump alarm keeps going off and pt states he can't sleep. Pt was instructed multiple times regarding its location and that his arm just be straight at all times. Pt forgets and still flex arms. Pt agrees to place a new IV instead. New IV 22G placed on right forearm, intact and patent. IV at right antecubital removed with tip intact. Pt tolerated procedure.   IV infusing well at this time.  Instructed to use call light to call for assistance.  Fall precautions in place.  Hourly rounding in progress.

## 2023-06-12 NOTE — DISCHARGE PLANNING
"HTH/SCP TCN chart review completed. Collaborated with STEPHANIE Tyler. Discussed current discharge considerations currently noted to home with close outpatient follow-up (please see Hospital Medicine note 6/11/2023 at 10:30AM).    TCN met with patient at bedside. Patient noted feelings of \"not feeling well\" requesting brief TCN visit. Patient ultimately requested TCN return at a later time due to not feeling well.    TCN will continue to follow and collaborate with discharge planning team as additional post acute needs arise. Thank you.    Completed  Choice obtained: MADAN YARBROUGH (AD) on 6/11/23 if recommended by therapy/physician.  Current physician recommendations are for home with close outpatient follow-up  [facility] referral (Y). Sent on 6/11/23.   "

## 2023-06-12 NOTE — RESPIRATORY CARE
"   COPD EDUCATION by COPD CLINICAL EDUCATOR  6/12/2023 at 10:23 AM by Indu Godoy, RRT     Patient reviewed by COPD education team. Patient does not have a formal history or diagnosis of COPD and is a non-smoker.  Patient was not interested in the COPD program, states he has allergies. Provided spacer with instruction for use, care, and cleaning for his rescue inhaler.     COPD Screen  COPD Risk Screening  Do you have a history of COPD?: No    COPD Assessment  COPD Clinical Specialists ONLY  COPD Education Initiated: Yes--Short Intervention (Pt w/ no hx dx COPD, NO PFT, Never Smoked, pt states has allergies a rescue inhaler uses occasionally, discussed using a spacer, gave instruct and usage as well as how to dx COPD by a PFT. Pt here SBO resection ending strangulated hernia repair)  DME Company: NONE  Physician Name: OMKAR JENNINGS  Pulmonologist Name: NONE  Referrals Initiated: Yes  Pulmonary Rehab: Declined  Smoking Cessation: N/A  Hospice: N/A  Home Health Care: N/A  Mobile Urgent Care Services: N/A  Geriatric Specialty Group: Yes  Private In-Home Care Agency: N/A  $ Demo/Eval of SVN's, MDI's and Aerosols: Yes  (OP) Pulmonary Function Testing: Yes (Pt unable to do a bedside PFT due to surgery)  Interdisciplinary Rounds: Attendance at Rounds (30 Min)    PFT Results    No results found for: PFT    Meds to Beds  Would the patient like to opt in for Bedside Medication Delivery at Discharge?: No     MY COPD ACTION PLAN     It is recommended that patients and physicians /healthcare providers complete this action plan together. This plan should be discussed at each physician visit and updated as needed.    The green, yellow and red zones show groups of symptoms of COPD. This list of symptoms is not comprehensive, and you may experience other symptoms. In the \"Actions\" column, your healthcare provider has recommended actions for you to take based on your symptoms.    Patient Name: David Darling   Date of " "Birth: 1935   Last Updated on:     Green Zone:  I am doing well today Actions     Usual activitiy and exercise level   Take daily medications     Usual amounts of cough and phlegm/mucus   Use oxygen as prescribed     Sleep well at night   Continue regular exercise/diet plan     Appetite is good   At all times avoid cigarette smoke, inhaled irritants     Daily Medications (these medications are taken every day):                Yellow Zone:  I am having a bad day or a COPD flare Actions     More breathless than usual   Continue daily medications     I have less energy for my daily activities   Use quick relief inhaler as ordered     Increased or thicker phlegm/mucus   Use oxygen as prescribed     Using quick relief inhaler/nebulizer more often   Get plenty of rest     Swelling of ankles more than usual   Use pursed lip breathing     More coughing than usual   At all times avoid cigarette smoke, inhaled irritants     I feel like I have a \"chest cold\"     Poor sleep and my symptoms woke me up     My appetite is not good     My medicine is not helping      Call provider immediately if symptoms don’t improve     Continue daily medications, add rescue medications:               Medications to be used during a flare up, (as Discussed with Provider):              Red Zone:  I need urgent medical care Actions     Severe shortness of breath even at rest   Call 911 or seek medical care immediately     Not able to do any activity because of breathing      Fever or shaking chills      Feeling confused or very drowsy       Chest pains      Coughing up blood                  "

## 2023-06-13 ENCOUNTER — HOME HEALTH ADMISSION (OUTPATIENT)
Dept: HOME HEALTH SERVICES | Facility: HOME HEALTHCARE | Age: 88
End: 2023-06-13
Payer: MEDICARE

## 2023-06-13 ENCOUNTER — TELEPHONE (OUTPATIENT)
Dept: MEDICAL GROUP | Facility: LAB | Age: 88
End: 2023-06-13
Payer: MEDICARE

## 2023-06-13 VITALS
BODY MASS INDEX: 25.7 KG/M2 | SYSTOLIC BLOOD PRESSURE: 170 MMHG | TEMPERATURE: 98.1 F | HEART RATE: 60 BPM | WEIGHT: 173.5 LBS | HEIGHT: 69 IN | OXYGEN SATURATION: 92 % | DIASTOLIC BLOOD PRESSURE: 63 MMHG | RESPIRATION RATE: 18 BRPM

## 2023-06-13 LAB
ANION GAP SERPL CALC-SCNC: 9 MMOL/L (ref 7–16)
BUN SERPL-MCNC: 12 MG/DL (ref 8–22)
CALCIUM SERPL-MCNC: 8.5 MG/DL (ref 8.4–10.2)
CHLORIDE SERPL-SCNC: 105 MMOL/L (ref 96–112)
CO2 SERPL-SCNC: 23 MMOL/L (ref 20–33)
CREAT SERPL-MCNC: 0.74 MG/DL (ref 0.5–1.4)
ERYTHROCYTE [DISTWIDTH] IN BLOOD BY AUTOMATED COUNT: 45.3 FL (ref 35.9–50)
GFR SERPLBLD CREATININE-BSD FMLA CKD-EPI: 88 ML/MIN/1.73 M 2
GLUCOSE SERPL-MCNC: 98 MG/DL (ref 65–99)
HCT VFR BLD AUTO: 31.2 % (ref 42–52)
HGB BLD-MCNC: 10.4 G/DL (ref 14–18)
MAGNESIUM SERPL-MCNC: 2 MG/DL (ref 1.5–2.5)
MCH RBC QN AUTO: 30.6 PG (ref 27–33)
MCHC RBC AUTO-ENTMCNC: 33.3 G/DL (ref 32.3–36.5)
MCV RBC AUTO: 91.8 FL (ref 81.4–97.8)
PLATELET # BLD AUTO: 215 K/UL (ref 164–446)
PMV BLD AUTO: 12.8 FL (ref 9–12.9)
POTASSIUM SERPL-SCNC: 3.7 MMOL/L (ref 3.6–5.5)
RBC # BLD AUTO: 3.4 M/UL (ref 4.7–6.1)
SODIUM SERPL-SCNC: 137 MMOL/L (ref 135–145)
WBC # BLD AUTO: 7.4 K/UL (ref 4.8–10.8)

## 2023-06-13 PROCEDURE — 36415 COLL VENOUS BLD VENIPUNCTURE: CPT

## 2023-06-13 PROCEDURE — 85027 COMPLETE CBC AUTOMATED: CPT

## 2023-06-13 PROCEDURE — 700105 HCHG RX REV CODE 258: Performed by: HOSPITALIST

## 2023-06-13 PROCEDURE — A9270 NON-COVERED ITEM OR SERVICE: HCPCS | Performed by: INTERNAL MEDICINE

## 2023-06-13 PROCEDURE — A9270 NON-COVERED ITEM OR SERVICE: HCPCS | Performed by: HOSPITALIST

## 2023-06-13 PROCEDURE — 80048 BASIC METABOLIC PNL TOTAL CA: CPT

## 2023-06-13 PROCEDURE — 700102 HCHG RX REV CODE 250 W/ 637 OVERRIDE(OP): Performed by: INTERNAL MEDICINE

## 2023-06-13 PROCEDURE — 83735 ASSAY OF MAGNESIUM: CPT

## 2023-06-13 PROCEDURE — 94760 N-INVAS EAR/PLS OXIMETRY 1: CPT

## 2023-06-13 PROCEDURE — 700102 HCHG RX REV CODE 250 W/ 637 OVERRIDE(OP): Performed by: HOSPITALIST

## 2023-06-13 PROCEDURE — 99239 HOSP IP/OBS DSCHRG MGMT >30: CPT | Performed by: INTERNAL MEDICINE

## 2023-06-13 RX ORDER — CEFDINIR 300 MG/1
300 CAPSULE ORAL 2 TIMES DAILY
Qty: 8 CAPSULE | Refills: 0 | Status: SHIPPED | OUTPATIENT
Start: 2023-06-13 | End: 2023-06-13

## 2023-06-13 RX ORDER — METRONIDAZOLE 500 MG/1
500 TABLET ORAL EVERY 12 HOURS
Status: DISCONTINUED | OUTPATIENT
Start: 2023-06-13 | End: 2023-06-13 | Stop reason: HOSPADM

## 2023-06-13 RX ORDER — METRONIDAZOLE 500 MG/1
500 TABLET ORAL EVERY 12 HOURS
Qty: 8 TABLET | Refills: 0 | Status: SHIPPED | OUTPATIENT
Start: 2023-06-13 | End: 2023-06-13

## 2023-06-13 RX ORDER — CEFDINIR 300 MG/1
300 CAPSULE ORAL EVERY 12 HOURS
Status: DISCONTINUED | OUTPATIENT
Start: 2023-06-13 | End: 2023-06-13 | Stop reason: HOSPADM

## 2023-06-13 RX ORDER — AMOXICILLIN AND CLAVULANATE POTASSIUM 875; 125 MG/1; MG/1
1 TABLET, FILM COATED ORAL 2 TIMES DAILY WITH MEALS
Qty: 6 TABLET | Refills: 0 | Status: SHIPPED | OUTPATIENT
Start: 2023-06-13 | End: 2023-06-13

## 2023-06-13 RX ORDER — ONDANSETRON 4 MG/1
4 TABLET, ORALLY DISINTEGRATING ORAL EVERY 4 HOURS PRN
Qty: 10 TABLET | Refills: 0 | Status: SHIPPED | OUTPATIENT
Start: 2023-06-13 | End: 2023-10-31

## 2023-06-13 RX ORDER — AMOXICILLIN AND CLAVULANATE POTASSIUM 875; 125 MG/1; MG/1
1 TABLET, FILM COATED ORAL 2 TIMES DAILY WITH MEALS
Status: DISCONTINUED | OUTPATIENT
Start: 2023-06-13 | End: 2023-06-13

## 2023-06-13 RX ORDER — METRONIDAZOLE 500 MG/1
500 TABLET ORAL EVERY 12 HOURS
Qty: 8 TABLET | Refills: 0 | Status: SHIPPED | OUTPATIENT
Start: 2023-06-13 | End: 2023-06-17

## 2023-06-13 RX ORDER — METRONIDAZOLE 500 MG/1
500 TABLET ORAL EVERY 8 HOURS
Status: DISCONTINUED | OUTPATIENT
Start: 2023-06-13 | End: 2023-06-13

## 2023-06-13 RX ORDER — CEFDINIR 300 MG/1
300 CAPSULE ORAL 2 TIMES DAILY
Qty: 8 CAPSULE | Refills: 0 | Status: SHIPPED | OUTPATIENT
Start: 2023-06-13 | End: 2023-06-17

## 2023-06-13 RX ADMIN — ACETAMINOPHEN 650 MG: 325 TABLET ORAL at 07:49

## 2023-06-13 RX ADMIN — LEVOTHYROXINE SODIUM 88 MCG: 88 TABLET ORAL at 05:50

## 2023-06-13 RX ADMIN — CEFDINIR 300 MG: 300 CAPSULE ORAL at 18:14

## 2023-06-13 RX ADMIN — SODIUM CHLORIDE, POTASSIUM CHLORIDE, SODIUM LACTATE AND CALCIUM CHLORIDE: 600; 310; 30; 20 INJECTION, SOLUTION INTRAVENOUS at 03:02

## 2023-06-13 RX ADMIN — MAGNESIUM HYDROXIDE 30 ML: 400 SUSPENSION ORAL at 18:14

## 2023-06-13 RX ADMIN — SODIUM CHLORIDE, POTASSIUM CHLORIDE, SODIUM LACTATE AND CALCIUM CHLORIDE: 600; 310; 30; 20 INJECTION, SOLUTION INTRAVENOUS at 13:24

## 2023-06-13 RX ADMIN — SENNOSIDES AND DOCUSATE SODIUM 2 TABLET: 50; 8.6 TABLET ORAL at 18:14

## 2023-06-13 RX ADMIN — ESCITALOPRAM OXALATE 10 MG: 10 TABLET ORAL at 05:50

## 2023-06-13 RX ADMIN — METRONIDAZOLE 500 MG: 500 TABLET ORAL at 05:50

## 2023-06-13 RX ADMIN — METRONIDAZOLE 500 MG: 500 TABLET ORAL at 18:14

## 2023-06-13 ASSESSMENT — FIBROSIS 4 INDEX: FIB4 SCORE: 2.52

## 2023-06-13 ASSESSMENT — PAIN DESCRIPTION - PAIN TYPE: TYPE: ACUTE PAIN

## 2023-06-13 NOTE — DISCHARGE SUMMARY
Discharge Summary    CHIEF COMPLAINT ON ADMISSION  Chief Complaint   Patient presents with    Abdominal Pain     Reports N/V/D and chills since yesterday. Reports abd pain over area of abdominal hernia. Denies noting bloody stools or vomit.        Reason for Admission  N/V     Admission Date  6/10/2023    CODE STATUS  Full Code    HPI & HOSPITAL COURSE  This is an 87 y.o. male with a past medical history of prostate cancer, sick sinus syndrome s/p pacemaker placement and hypothyroidism who presented 6/10/2023 with nausea vomiting, chills and abdominal pain.  He was found to have abdominal obstruction and general surgery was consulted.  He was evaluated at [facility] and underwent an exploratory laparotomy with small bowel resection and ventral hernia repair on 6/10/2023.  This was done without complication.  On 6/11, WBC increased significantly to 19.8, as expected postoperatively.  He was noted to have some evidence of peritoneal contamination and perforation therefore IV antibiotics were initiated.    Over the next 48 hours white blood cell count trended down and normalized.  Hemoglobin dropped slightly from 12.9 postoperatively to 10.4 however this was partially secondary to dilution.  He had no evidence of active bleeding and minimal blood loss during surgery.  He had mild hyponatremia which was corrected with IV fluids.     His diet was advanced and was well-tolerated.  He began having bowel movements and passing gas.  General surgery evaluated the patient and cleared him for discharge.  He was seen by PT OT who recommended home health which was ordered for him.    Therefore, he is discharged in fair and stable condition to home with organized home healthcare and close outpatient follow-up.    The patient met 2-midnight criteria for an inpatient stay at the time of discharge.    Discharge Date  6/13/2023    FOLLOW UP ITEMS POST DISCHARGE  Follow-up with Dr. Sánchez on 6/24    DISCHARGE DIAGNOSES  Principal  Problem:    Small bowel obstruction (HCC) (POA: Yes)  Active Problems:    GERD (gastroesophageal reflux disease) (POA: Yes)    Hypothyroidism (POA: Yes)    Cardiac pacemaker in situ (POA: Yes)      Overview: September 2021: [employer]onic Sohpia S DR ARITA [id number] implanted by Dr. Fulton.    Prostatic hyperplasia (POA: Yes)    Pulmonary nodule (POA: Yes)    Other specified anemias (POA: Unknown)  Resolved Problems:    * No resolved hospital problems. *      FOLLOW UP  Future Appointments   Date Time Provider Department Center   6/22/2023  2:20 PM Mohsen Flores M.D. [facility]     No follow-up provider specified.    MEDICATIONS ON DISCHARGE     Medication List        START taking these medications        Instructions   amoxicillin-clavulanate 875-125 MG Tabs  Commonly known as: AUGMENTIN   Take 1 Tablet by mouth 2 times a day with meals for 3 days.  Dose: 1 Tablet     ondansetron 4 MG Tbdp  Commonly known as: ZOFRAN ODT   Take 1 Tablet by mouth every four hours as needed for Nausea/Vomiting (give PO if IV route is unavailable.).  Dose: 4 mg            CONTINUE taking these medications        Instructions   albuterol 108 (90 Base) MCG/ACT Aers inhalation aerosol   Inhale 2 Puffs every 6 hours as needed for Shortness of Breath.  Dose: 2 Puff     ALPRAZolam 1 MG Tabs  Commonly known as: XANAX   Take 1 mg by mouth at bedtime as needed for Sleep.  Dose: 1 mg     Erleada 60 MG Tabs  Generic drug: Apalutamide   Take 120 mg by mouth every day. 2 tablets = 120 mg.  Dose: 120 mg     escitalopram 10 MG Tabs  Commonly known as: Lexapro   Take 1 Tablet by mouth every day.  Dose: 10 mg     levothyroxine 88 MCG Tabs  Commonly known as: SYNTHROID   Take 88 mcg by mouth every morning on an empty stomach.  Dose: 88 mcg     multivitamin Tabs   Take 1 Tablet by mouth every evening.  Dose: 1 Tablet     Orgovyx 120 MG Tabs  Generic drug: Relugolix   Take 120 mg by mouth every day.  Dose: 120 mg     PreserVision AREDS 2 Caps   Take 1  Capsule by mouth 2 times a day.  Dose: 1 Capsule     tamsulosin 0.4 MG capsule  Commonly known as: FLOMAX   Take 0.4 mg by mouth at bedtime.  Dose: 0.4 mg              Allergies  Allergies   Allergen Reactions    Augmentin [Amoxicillin-Pot Clavulanate] Vomiting       DIET  Orders Placed This Encounter   Procedures    Diet Order Diet: Full Liquid     Standing Status:   Standing     Number of Occurrences:   1     Order Specific Question:   Diet:     Answer:   Full Liquid [11]       ACTIVITY  As tolerated.  Weight bearing as tolerated    CONSULTATIONS  Dr. Sánchez, surgery    PROCEDURES  6/10/2023: 1.  Exploratory laparotomy  2.  Small bowel resection  3.  Ventral hernia repair    LABORATORY  Lab Results   Component Value Date    SODIUM 137 06/13/2023    POTASSIUM 3.7 06/13/2023    CHLORIDE 105 06/13/2023    CO2 23 06/13/2023    GLUCOSE 98 06/13/2023    BUN 12 06/13/2023    CREATININE 0.74 06/13/2023        Lab Results   Component Value Date    WBC 7.4 06/13/2023    HEMOGLOBIN 10.4 (L) 06/13/2023    HEMATOCRIT 31.2 (L) 06/13/2023    PLATELETCT 215 06/13/2023        Total time of the discharge process exceeds 43 minutes.

## 2023-06-13 NOTE — PROGRESS NOTES
"Pt requesting SCDs be removed at this time, wanting a \"break\". Pt educated on use and reason for SCDs  "

## 2023-06-13 NOTE — PROGRESS NOTES
Patient up to the bathroom after lab luh blood. Patient very concerned and upset with blood draw. He doesn't feel it is appropriate for us to take his blood. Tried to explain the importance of various tests to determine his treatment. He would like to speak with a patient advocate in the AM.    0326  Patient appears to be becoming slightly confused and forgetful. Possible sundowning.

## 2023-06-13 NOTE — PROGRESS NOTES
POD 2  Patient appears comfortable  Tolerating PO  +bm  Abdomen soft, distended, tender along midline  Dressing dry      -Diet as tolerated  -PT/OT  -Home w/ home health for PT

## 2023-06-13 NOTE — TELEPHONE ENCOUNTER
ESTABLISHED PATIENT PRE-VISIT PLANNING     Patient was NOT contacted to complete PVP.     Note: Patient will not be contacted if there is no indication to call.     1.  Reviewed notes from the last few office visits within the medical group: Yes    2.  If any orders were placed at last visit or intended to be done for this visit (i.e. 6 mos follow-up), do we have Results/Consult Notes?           Labs - Labs were not ordered at last office visit.  Note: If patient appointment is for lab review and patient did not complete labs, check with provider if OK to reschedule patient until labs completed.         Imaging - Imaging was not ordered at last office visit.         Referrals - No referrals were ordered at last office visit.    3. Is this appointment scheduled as a Hospital Follow-Up? Yes, visit was at [facility].     4.  Immunizations were updated in Epic using Reconcile Outside Information activity? Yes    5.  Patient is due for the following Health Maintenance Topics:   Health Maintenance Due   Topic Date Due    Annual Wellness Visit  Never done    IMM ZOSTER VACCINES (1 of 2) Never done    COVID-19 Vaccine (5 - Moderna series) 04/10/2023     6.  AHA (Pulse8) form printed for Provider? N/A

## 2023-06-13 NOTE — PROGRESS NOTES
BSSR received from day shift RN. Patient laying in bed in no apparent distress with no complaints. A&O X4. Plan of care discussed with patient. Bed in low position with bed brakes applied and call light in reach. Patient states no needs at this time.

## 2023-06-13 NOTE — CARE PLAN
The patient is Stable - Low risk of patient condition declining or worsening    Shift Goals  Clinical Goals: Patient to be free from falls and pain controlled at a 3/10 or less.  Patient Goals: 5+ hours of sleep/rest overnight  Family Goals: NA    Progress made toward(s) clinical / shift goals:  Patient pain has been well controlled and no falls overnight    Patient is not progressing towards the following goals:

## 2023-06-13 NOTE — PROGRESS NOTES
Bedside report received from TUSHAR Guillen. Assumed care of patient. Daily plan of care discussed. Pt resting comfortably in bed with no signs of distress noted. Breathing even and unlabored. Patient to be evaluated by OT. Patient reports pain level 4/10, Will review pain medication interventions. Patient reports no needs at this time. Call light within reach. Bed locked in lowest position. Hourly rounding in place

## 2023-06-13 NOTE — DISCHARGE PLANNING
HTH/SCP TCN chart review completed. Noted patient seen by PT with recommendations for home health for continued physical therapy (please see PT note 6/12/2023 at 5:05PM). Collaborated with STEPHANIE Tyler. Discussed current discharge considerations noted to home with home healthcare.     TCN met with patient at bedside. Discussed current discharge considerations with patient verbalizing understanding but also requested TCN place call to patient son to provide update regarding discharge plan.    1313- TCN placed call to patient sonDavid. Discussed home healthcare and [facility] with patient son questions answered. Son advised that patient does not have a walker at home (per PT recommendations).     Collaborated with CM following telephone conversation with patient son. Discussed discharge considerations and recommendations. Appreciate CM following for HH and DME orders/ face to face and sending referrals as appropriate.     TCN will continue to follow and collaborate with discharge planning team as additional post acute needs arise. Thank you.    Completed  PT with recommendations for home health for continued physical therapy services and a Front-Wheel Walker on 6/12/2023  Choice obtained: LINNEA,  MADAN (AD) on 6/11/23. Discharge plan is now home with HHC and FWW based on therapy and physician recommendations  [facility] referral (Y). Sent on 6/11/23.

## 2023-06-13 NOTE — PROGRESS NOTES
Notified at shift change by NOC CNA patient refusing bed alarm and attempting to get up self.   This RN educated pt on fall safety and reason for bed alarm. Pt agreeable to bed alarm usage and agreed to use shukri light when needing to use the restroom.

## 2023-06-13 NOTE — FACE TO FACE
Face to Face Supporting Documentation - Home Health    The encounter with this patient was in whole or in part the primary reason for home health admission.    Date of encounter:   Patient:                    MRN:                       YOB: 2022  David Darling  [id number]  1935     Home health to see patient for:  Skilled Nursing care for assessment, interventions & education, Home health aide, Physical Therapy evaluation and treatment, and Occupational therapy evaluation and treatment    Skilled need for:  Surgical Aftercare Small Bowel obstruction    Skilled nursing interventions to include:  Comment: None    Homebound status evidenced by:  Need the aid of supportive devices such as crutches, canes, wheelchairs or walkers. Leaving home requires a considerable and taxing effort. There is a normal inability to leave the home.    Community Physician to provide follow up care: Mohsen Flores M.D.     Optional Interventions? No      I certify the face to face encounter for this home health care referral meets the CMS requirements and the encounter/clinical assessment with the patient was, in whole, or in part, for the medical condition(s) listed above, which is the primary reason for home health care. Based on my clinical findings: the service(s) are medically necessary, support the need for home health care, and the homebound criteria are met.  I certify that this patient has had a face to face encounter by myself.  Yaritza Urbina D.O. - NPI: [id number]

## 2023-06-13 NOTE — THERAPY
Physical Therapy   Initial Evaluation     Patient Name: David Darling  Age:  87 y.o., Sex:  male  Medical Record #: [id number]  Today's Date: 6/12/2023     Precautions  Precautions: (P) Fall Risk  Comments: (P) abdominal incision    Assessment  Patient is 87 y.o. male who was recently admitted for nausea, vomiting, and abdominal pain secondary to small bowel obstruction. Pt is now status post exploratory laparotomy: 8 cm ventral hernia with strangulated small bowel with obstruction, small bowel resection and ventral hernia repair. Pt was agreeable to therapy evaluation and presented to PT with pain, impaired balance, impaired gait, weakness, and poor upright activity tolerance. These impairments are limiting the patients ability to safely perform bed mobility, sit<>stands, transfers, and ambulation. Pt had the most difficulty with bed mobility due to abdominal pain. Pt was able to demonstrate Min A for bed mobility, CGA for sit<>stands, ambulation, and transfers with FWW use. Pt will continue to benefit from skilled PT while in house with recommendation for HH therapy services and FWW use. Pt states he will have assist from son upon d/c to home.    Plan    Physical Therapy Initial Treatment Plan   Treatment Plan : (P) Bed Mobility, Equipment, Gait Training, Manual Therapy, Neuro Re-Education / Balance, Self Care / Home Evaluation, Therapeutic Activities, Therapeutic Exercise  Treatment Frequency: (P) 4 Times per Week  Duration: (P) Until Therapy Goals Met    DC Equipment Recommendations: (P) Front-Wheel Walker  Discharge Recommendations: (P) Recommend home health for continued physical therapy services     Objective       06/12/23 1705   Initial Contact Note    Initial Contact Note Order Received and Verified, Physical Therapy Evaluation in Progress with Full Report to Follow.   Precautions   Precautions Fall Risk   Comments abdominal incision   Pain 0 - 10 Group   Location Abdomen   Location Orientation  Right;Left;Mid   Description Aching   Therapist Pain Assessment Prior to Activity;During Activity;Post Activity;Nurse Notified;5   Prior Living Situation   Prior Services Home-Independent   Housing / Facility 1 Story Apartment / Condo   Steps Into Home 0   Steps In Home 0   Equipment Owned None   Lives with - Patient's Self Care Capacity Alone and Able to Care For Self   Comments pt states his son will be able to assist upon d/c to home   Prior Level of Functional Mobility   Bed Mobility Independent   Transfer Status Independent   Ambulation Independent   Ambulation Distance   (community)   Assistive Devices Used None   Stairs Independent   History of Falls   History of Falls No   Cognition    Cognition / Consciousness WDL   Level of Consciousness Alert   Comments pleasant/cooperative   Passive ROM Upper Body   Passive ROM Upper Body WDL   Active ROM Upper Body   Active ROM Upper Body  WDL   Strength Upper Body   Upper Body Strength  WDL   Sensation Upper Body   Upper Extremity Sensation  WDL   Upper Body Muscle Tone   Upper Body Muscle Tone  WDL   Passive ROM Lower Body   Passive ROM Lower Body WDL   Active ROM Lower Body    Active ROM Lower Body  WDL   Strength Lower Body   Lower Body Strength  X   Gross Strength Generalized Weakness, Equal Bilaterally   Comments demonstrates with functional strength for B LE   Sensation Lower Body   Lower Extremity Sensation   WDL   Lower Body Muscle Tone   Lower Body Muscle Tone  WDL   Neurological Concerns   Neurological Concerns No   Coordination Upper Body   Coordination WDL   Coordination Lower Body    Coordination Lower Body  WDL   Balance Assessment   Sitting Balance (Static) Fair +   Sitting Balance (Dynamic) Fair   Standing Balance (Static) Fair +   Standing Balance (Dynamic) Fair   Weight Shift Sitting Fair   Weight Shift Standing Fair   Comments w/fww   Bed Mobility    Supine to Sit Minimal Assist   Sit to Supine Minimal Assist   Scooting Contact Guard Assist    Comments HOB elevated and rails up   Gait Analysis   Gait Level Of Assist Standby Assist   Assistive Device Front Wheel Walker   Distance (Feet) 120   # of Times Distance was Traveled 1   Deviation Decreased Base Of Support;Bradykinetic   Weight Bearing Status fwb   Functional Mobility   Sit to Stand Contact Guard Assist   Bed, Chair, Wheelchair Transfer Contact Guard Assist   Transfer Method Stand Step   Mobility EOB, sit<>stand, ambulation, back to bed   How much difficulty does the patient currently have...   Turning over in bed (including adjusting bedclothes, sheets and blankets)? 2   Sitting down on and standing up from a chair with arms (e.g., wheelchair, bedside commode, etc.) 3   Moving from lying on back to sitting on the side of the bed? 1   How much help from another person does the patient currently need...   Moving to and from a bed to a chair (including a wheelchair)? 3   Need to walk in a hospital room? 3   Climbing 3-5 steps with a railing? 3   6 clicks Mobility Score 15   Activity Tolerance   Sitting in Chair NT   Sitting Edge of Bed 5 mins   Standing 8 mins   Comments limited by pain   Edema / Skin Assessment   Edema / Skin  Not Assessed   Patient / Family Goals    Patient / Family Goal #1 to go home   Short Term Goals    Short Term Goal # 1 pt will go supine<>sit w/hob flat and rails down w/spv in 6tx   Short Term Goal # 2 pt will go sit<>stand w/fww w/spv in 6tx   Short Term Goal # 3 pt will transfer bed<>chair w/fww w/spv in 6tx   Short Term Goal # 4 pt will ambulate for 150ft w/fww w/spv in 6tx   Education Group   Education Provided Role of Physical Therapist   Role of Physical Therapist Patient Response Patient;Acceptance;Explanation;Demonstration;Verbal Demonstration;Action Demonstration   Physical Therapy Initial Treatment Plan    Treatment Plan  Bed Mobility;Equipment;Gait Training;Manual Therapy;Neuro Re-Education / Balance;Self Care / Home Evaluation;Therapeutic Activities;Therapeutic  Exercise   Treatment Frequency 4 Times per Week   Duration Until Therapy Goals Met   Problem List    Problems Impaired Bed Mobility;Pain;Impaired Transfers;Impaired Ambulation;Functional Strength Deficit;Impaired Balance;Decreased Activity Tolerance   Anticipated Discharge Equipment and Recommendations   DC Equipment Recommendations Front-Wheel Walker   Discharge Recommendations Recommend home health for continued physical therapy services   Interdisciplinary Plan of Care Collaboration   IDT Collaboration with  Nursing   Patient Position at End of Therapy In Bed;Call Light within Reach;Tray Table within Reach;Phone within Reach   Collaboration Comments aware of visit and recs   Session Information   Date / Session Number  6/12-1 (1/4, 6/18)

## 2023-06-13 NOTE — DISCHARGE PLANNING
1314: RN CM received notification of HH order being placed. HH Choice on file from [facility] TCN. Referral sent to [facility] per choice form for therapy services on discharge. Pending acceptance.  1420: RN CM received notification of DME need for a walker on discharge. [facility] Referral sent to [facility] per choice form for DME. Pending acceptance.

## 2023-06-14 ENCOUNTER — PATIENT OUTREACH (OUTPATIENT)
Dept: MEDICAL GROUP | Facility: LAB | Age: 88
End: 2023-06-14
Payer: MEDICARE

## 2023-06-14 NOTE — PROGRESS NOTES
PT requesting to follow through with discharge, hayde Hernandez at bedside. Notified by unit clerk, that son Darius calling and verbally aggressive requesting update from charge RN. Darius is not listed as a contact in kardex. Pt requested that we not provide darius with an update as he does not consent to us giving information at this time.

## 2023-06-14 NOTE — PROGRESS NOTES
"Pt discharged per MD orders.   Pt meeting discharge criteria.   VSS.   Pain controlled.   IV removed.   Discharge instructions and prescriptions reviewed with pt. Pt verbalized understanding.     Received phone call from son Darius, demanding update. This RN attempted to explain that we did not have permission from the patient nor was he listed as a contact for us to discuss treatment with. Darius became verbally aggressive with this RN, again attempted to explain that there was no information that could be provided, \"fucking asshole\" Darius stated as he hung up.     Prior to DC Pt David gave this RN to speak with Opal Roldan over the phone. Opal called this RN and updates were provided per David Darling's verbal request made to this RN while reviewing DC paperwork   "

## 2023-06-14 NOTE — PROGRESS NOTES
6/14: 1st Patient outreach for TCM.  LVM with contact information.  6/15: 2nd Patient outreach for TCM.  LVM with contact information.

## 2023-06-14 NOTE — DISCHARGE PLANNING
Anticipated Discharge Disposition: Home after appeal completed    Action: RN called advised pt wants to appeal dc. He appears to have received IMM 6.12.23. She and charge RN Ricardo are advised he contacts the number on that paper work( also in media tab) to start his appeal process    Barriers to Discharge: appeal    Plan: no further needs

## 2023-06-15 NOTE — PROGRESS NOTES
Transitional Care Management    This patient qualifies for a Transitional Care Management visit as they are being seen within the required time and two attempts were made to contact this patient within two business days to review discharge per CMS guidelines.  You therefore can see them as a TCM visit and charge appropriately.    Subjective:     aDvid Darling is a 87 y.o. male with a past medical history of prostate cancer, sick sinus syndrome post pacemaker and hypothyroidism who presents for Hospital Follow-up.    HPI:   Recently hospitalized 6/10-6/13 after presenting with nausea, vomiting, chills, and abdominal pain.  He was found to have bowel obstruction underwent exploratory laparotomy with small bowel resection and ventral hernia repair.  There was evidence of peritoneal contamination and IV antibiotics were initiated postop.  Downtrending hemoglobin on discharge but this was thought dilutional.  Discharged with home health.    Reports doing well at home, has follow-up with surgeon next week.  Pain is well controlled.  No fevers, voiding well, using MiraLAX and has been having bowel movements.  Had home PT but has been discharged as his mobility has improved.      Current medicines (including reconciliation performed today)  Current Outpatient Medications   Medication Sig Dispense Refill    ondansetron (ZOFRAN ODT) 4 MG TABLET DISPERSIBLE Take 1 Tablet by mouth every four hours as needed for Nausea/Vomiting (give PO if IV route is unavailable.). 10 Tablet 0    cefdinir (OMNICEF) 300 MG Cap Take 1 Capsule by mouth 2 times a day for 4 days. 8 Capsule 0    metroNIDAZOLE (FLAGYL) 500 MG Tab Take 1 Tablet by mouth every 12 hours for 4 days. 8 Tablet 0    Apalutamide (ERLEADA) 60 MG Tab Take 120 mg by mouth every day. 2 tablets = 120 mg.      multivitamin Tab Take 1 Tablet by mouth every evening.      Multiple Vitamins-Minerals (PRESERVISION AREDS 2) Cap Take 1 Capsule by mouth 2 times a day.      ALPRAZolam  "(XANAX) 1 MG Tab Take 1 mg by mouth at bedtime as needed for Sleep.      levothyroxine (SYNTHROID) 88 MCG Tab Take 88 mcg by mouth every morning on an empty stomach.      escitalopram (LEXAPRO) 10 MG Tab Take 1 Tablet by mouth every day. 90 Tablet 3    albuterol 108 (90 Base) MCG/ACT Aero Soln inhalation aerosol Inhale 2 Puffs every 6 hours as needed for Shortness of Breath. 8.5 g 0    Relugolix (ORGOVYX) 120 MG Tab Take 120 mg by mouth every day.      tamsulosin (FLOMAX) 0.4 MG capsule Take 0.4 mg by mouth at bedtime.       No current facility-administered medications for this visit.       Allergies:   Augmentin [amoxicillin-pot clavulanate]    Social History     Tobacco Use    Smoking status: Never    Smokeless tobacco: Never   Vaping Use    Vaping Use: Never used   Substance Use Topics    Alcohol use: No    Drug use: Yes     Types: Oral     Comment: marijuan tincture  for sleep nightly       ROS:  See HPI    Objective:     Vitals:    06/22/23 1322   BP: 114/70   BP Location: Right arm   Patient Position: Sitting   BP Cuff Size: Adult   Pulse: 78   Resp: 16   Temp: 36.4 °C (97.5 °F)   SpO2: 98%   Weight: 72.8 kg (160 lb 9.6 oz)   Height: 1.753 m (5' 9\")     Body mass index is 23.72 kg/m².    Physical Exam:  Gen: NAD, alert  HEENT: MMM  CV: RRR  Lungs: CTAB  Abd: Soft, nontender, mild distention.  Well-healing midline incision with staples intact just superior to the umbilicus.  No surrounding erythema.    Assessment and Plan:     1. Hospital discharge follow-up  2. Ventral hernia with bowel obstruction  Hospitalized 6/10-6/13 after presenting with nausea, vomiting, chills, and abdominal pain.  He was found to have bowel obstruction underwent exploratory laparotomy with small bowel resection and ventral hernia repair.  Doing well since discharge.  Pain well controlled.  Incision well-healing with staples intact.  Voiding, stooling.  Has follow-up with surgery next week.    3. Hypothyroidism, unspecified " type  Continue Synthroid, TSH normal last month.  - levothyroxine (SYNTHROID) 88 MCG Tab; Take 1 Tablet by mouth every morning on an empty stomach. Indications: Underactive Thyroid  Dispense: 90 Tablet; Refill: 3    4. Anemia, unspecified type  During recent hospitalization, does have chronic anemia secondary to prostate cancer treatment.  Recheck CBC.  - CBC WITHOUT DIFFERENTIAL; Future    5. Recurrent major depressive disorder, in full remission (HCC)  Stable, continue Lexapro.  -escitalopram (LEXAPRO) 10 MG Tab; Take 1 Tablet by mouth every day. Indications: Major Depressive Disorder  Dispense: 90 Tablet; Refill: 3    6. Aneurysm of ascending aorta without rupture (HCC)  On CT scan at [facility] last year 4.1 cm.  Follow-up surveillance scan pending.    7. Bronchospasm  Continue albuterol as needed.  - albuterol 108 (90 Base) MCG/ACT Aero Soln inhalation aerosol; Inhale 2 Puffs every 6 hours as needed for Shortness of Breath. Indications: Spasm of Lung Air Passages  Dispense: 8.5 g; Refill: 0    8. Prostatic hyperplasia  Continue Flomax.  - tamsulosin (FLOMAX) 0.4 MG capsule; Take 1 Capsule by mouth at bedtime. Indications: Chronic Prostate Gland Inflammation  Dispense: 90 Capsule; Refill: 3      - Chart and discharge summary were reviewed.   - Hospitalization and results reviewed with patient.   - Medications reviewed including instructions regarding high risk medications, dosing and side effects.  - Recommended Services: No services needed at this time  - Advance directive/POLST on file?  No     Follow-up:Return in about 3 months (around 9/22/2023).    Face-to-face transitional care management services with MODERATE (today's visit is within 14 days post discharge & LACE+ score of 28-58) medical decision complexity were provided.     LACE+ Historical Score Over Time (0-28: Low, 29-58: Medium, 59+: High): 79

## 2023-06-16 NOTE — DISCHARGE PLANNING
note:  Received a call from pt asking whether he should change the dressing.     CM called Will RN charge nurse in [facility] who said that if dressing is not soiled or wet then do not remove the dressing. Notified pt.     CM also gave pt the contact number for [facility] as they are supposed to see him within 48 hrs.

## 2023-06-17 ENCOUNTER — HOME CARE VISIT (OUTPATIENT)
Dept: HOME HEALTH SERVICES | Facility: HOME HEALTHCARE | Age: 88
End: 2023-06-17
Payer: MEDICARE

## 2023-06-17 VITALS
RESPIRATION RATE: 18 BRPM | TEMPERATURE: 97.6 F | HEIGHT: 69 IN | DIASTOLIC BLOOD PRESSURE: 66 MMHG | BODY MASS INDEX: 25.62 KG/M2 | SYSTOLIC BLOOD PRESSURE: 125 MMHG | OXYGEN SATURATION: 94 % | WEIGHT: 173 LBS

## 2023-06-17 PROCEDURE — G0493 RN CARE EA 15 MIN HH/HOSPICE: HCPCS

## 2023-06-17 PROCEDURE — 665001 SOC-HOME HEALTH

## 2023-06-17 ASSESSMENT — FIBROSIS 4 INDEX: FIB4 SCORE: 2.52

## 2023-06-17 ASSESSMENT — ENCOUNTER SYMPTOMS: DEPRESSED MOOD: 1

## 2023-06-17 NOTE — DISCHARGE PLANNING
note:  Received a call from son stating that his father has not received a call from [facility] or a schedule for the visit.     CM called Xin at [facility] . She said that their nurse was able to talk to pt and the nurse will visit today.     CM notified son.

## 2023-06-17 NOTE — Clinical Note
Primary DX/skilled need: Small bowel obstruction; s/p Exploratory Lap  SN to observe,assess,teach,train and monitor medications and disease conditions.  SN Frequencies: 1w4,2 PRN  Zip Code:[id number]  Disciplines ordered: OT,PT,SN,HHA  Insurance Authorization:[facility]  Certification Period:6/17/23-8/15/23  Special Considerations: none

## 2023-06-18 SDOH — ECONOMIC STABILITY: HOUSING INSECURITY: HOME SAFETY: REVIEWED FALL RISKS AND INSTRUCTED PT TO USE WALKER.

## 2023-06-18 ASSESSMENT — ENCOUNTER SYMPTOMS
PAIN SEVERITY GOAL: 0/10
PAIN LOCATION - PAIN QUALITY: SHARP
LOWEST PAIN SEVERITY IN PAST 24 HOURS: 2/10
SUBJECTIVE PAIN PROGRESSION: GRADUALLY IMPROVING
PERSON REPORTING PAIN: PATIENT
HIGHEST PAIN SEVERITY IN PAST 24 HOURS: 5/10
DEBILITATING PAIN: 1
PAIN: 1
PAIN LOCATION: ABDOMEN
PAIN LOCATION - PAIN FREQUENCY: INTERMITTENT
PAIN LOCATION - PAIN SEVERITY: 3/10

## 2023-06-19 ENCOUNTER — TELEPHONE (OUTPATIENT)
Dept: HEALTH INFORMATION MANAGEMENT | Facility: OTHER | Age: 88
End: 2023-06-19

## 2023-06-19 ENCOUNTER — DOCUMENTATION (OUTPATIENT)
Dept: MEDICAL GROUP | Facility: PHYSICIAN GROUP | Age: 88
End: 2023-06-19
Payer: MEDICARE

## 2023-06-19 ASSESSMENT — ACTIVITIES OF DAILY LIVING (ADL)
CURRENT_FUNCTION: SUPERVISION
PREPARING MEALS: INDEPENDENT
TOILETING: SUPERVISION
USING THE TELPHONE: INDEPENDENT
TOILETING: 1
AMBULATION ASSISTANCE: 1
AMBULATION ASSISTANCE: SUPERVISION
PHYSICAL TRANSFERS ASSESSED: 1
TELEPHONE USE ASSESSED: 1

## 2023-06-19 ASSESSMENT — ENCOUNTER SYMPTOMS
ABDOMINAL PAIN: 1
SHORTNESS OF BREATH: 1
DIARRHEA: 1
LAST BOWEL MOVEMENT: 66642
DYSPNEA ACTIVITY LEVEL: AFTER AMBULATING MORE THAN 20 FT
DYSPNEA ON EXERTION: 1
STOOL FREQUENCY: DAILY

## 2023-06-19 NOTE — PROGRESS NOTES
Medication chart review for [facility] services    Received referral from [facility].   Medications reviewed  compared with discharge summary if available.  Discharge summary date, if applicable:   6/13    Current medication list per [facility]     Medication list one, patient is currently taking    Current Outpatient Medications:     Vitamin D3, 1 Capsule, Oral, DAILY AT 1800    EVENING PRIMROSE OIL PO, 1,500 Capsule, Oral, Once PRN    Green Tea, Silvia sinensis, ([employer] GREEN TEA EXTRACT PO), 1 Capsule, Oral, QDAY PRN    ondansetron, 4 mg, Oral, Q4HRS PRN    multivitamin, 1 Tablet, Oral, Q EVENING    PreserVision AREDS 2, 1 Capsule, Oral, BID    levothyroxine, 88 mcg, Oral, AM ES    escitalopram, 10 mg, Oral, QDAY    albuterol, 2 Puff, Inhalation, Q6HRS PRN    Orgovyx, 120 mg, Oral, DAILY    tamsulosin, 0.4 mg, Oral, QHS      Medication list two, drugs that the patient has been prescribed or recommended to take by their healthcare provider on discharge summary  START taking these medications         Instructions   amoxicillin-clavulanate 875-125 MG Tabs  Commonly known as: AUGMENTIN    Take 1 Tablet by mouth 2 times a day with meals for 3 days.  Dose: 1 Tablet      ondansetron 4 MG Tbdp  Commonly known as: ZOFRAN ODT    Take 1 Tablet by mouth every four hours as needed for Nausea/Vomiting (give PO if IV route is unavailable.).  Dose: 4 mg                CONTINUE taking these medications         Instructions   albuterol 108 (90 Base) MCG/ACT Aers inhalation aerosol    Inhale 2 Puffs every 6 hours as needed for Shortness of Breath.  Dose: 2 Puff      ALPRAZolam 1 MG Tabs  Commonly known as: XANAX    Take 1 mg by mouth at bedtime as needed for Sleep.  Dose: 1 mg      Erleada 60 MG Tabs  Generic drug: Apalutamide    Take 120 mg by mouth every day. 2 tablets = 120 mg.  Dose: 120 mg      escitalopram 10 MG Tabs  Commonly known as: Lexapro    Take 1 Tablet by mouth every day.  Dose: 10 mg      levothyroxine 88  MCG Tabs  Commonly known as: SYNTHROID    Take 88 mcg by mouth every morning on an empty stomach.  Dose: 88 mcg      multivitamin Tabs    Take 1 Tablet by mouth every evening.  Dose: 1 Tablet      Orgovyx 120 MG Tabs  Generic drug: Relugolix    Take 120 mg by mouth every day.  Dose: 120 mg      PreserVision AREDS 2 Caps    Take 1 Capsule by mouth 2 times a day.  Dose: 1 Capsule      tamsulosin 0.4 MG capsule  Commonly known as: FLOMAX    Take 0.4 mg by mouth at bedtime.  Dose: 0.4 mg                 Allergies   Allergen Reactions    Augmentin [Amoxicillin-Pot Clavulanate] Vomiting       Labs     Lab Results   Component Value Date/Time    SODIUM 137 06/13/2023 01:59 AM    POTASSIUM 3.7 06/13/2023 01:59 AM    CHLORIDE 105 06/13/2023 01:59 AM    CO2 23 06/13/2023 01:59 AM    GLUCOSE 98 06/13/2023 01:59 AM    BUN 12 06/13/2023 01:59 AM    CREATININE 0.74 06/13/2023 01:59 AM     Lab Results   Component Value Date/Time    ALKPHOSPHAT 79 06/11/2023 01:32 AM    ASTSGOT 26 06/11/2023 01:32 AM    ALTSGPT 17 06/11/2023 01:32 AM    TBILIRUBIN 0.6 06/11/2023 01:32 AM    INR 1.17 (H) 06/10/2023 05:50 PM    ALBUMIN 4.1 06/11/2023 01:32 AM        Assessment for clinically significant drug interactions, drug omissions/additions, duplicative therapies.        Not on Erleada 60 MG Tabs, Generic drug: Apalutamide       CAROLYN Flores M.D.  [address] [address]  Fax: [phone]    [facility]  Phone [phone] fax [phone]    This note was created using voice recognition software (Dragon). The accuracy of the dictation is limited by the abilities of the software. I have reviewed the note prior to signing, however some errors in grammar and context are still possible. If you have any questions related to this note please do not hesitate to contact our office.

## 2023-06-19 NOTE — CASE COMMUNICATION
noted  ----- Message -----  From: Kaity Cohn R.N.  Sent: 6/17/2023   8:49 PM PDT  To: Lizbeth Sun R.N.; Akosua Almazan R.N.; *      Primary DX/skilled need: Small bowel obstruction; s/p Exploratory Lap  SN to observe,assess,teach,train and monitor medications and disease conditions.  SN Frequencies: 1w4,2 PRN  Zip Code:[id number]  Disciplines ordered: OT,PT,SN,HHA  Insurance Authorization:[facility]  Certification Period:6/17/23-8/15/23   Special Considerations: none

## 2023-06-20 ENCOUNTER — HOME CARE VISIT (OUTPATIENT)
Dept: HOME HEALTH SERVICES | Facility: HOME HEALTHCARE | Age: 88
End: 2023-06-20
Payer: MEDICARE

## 2023-06-20 VITALS
SYSTOLIC BLOOD PRESSURE: 119 MMHG | OXYGEN SATURATION: 96 % | HEART RATE: 78 BPM | DIASTOLIC BLOOD PRESSURE: 62 MMHG | TEMPERATURE: 97.5 F | RESPIRATION RATE: 16 BRPM

## 2023-06-20 PROCEDURE — G0156 HHCP-SVS OF AIDE,EA 15 MIN: HCPCS

## 2023-06-20 PROCEDURE — G0151 HHCP-SERV OF PT,EA 15 MIN: HCPCS

## 2023-06-20 PROCEDURE — G0152 HHCP-SERV OF OT,EA 15 MIN: HCPCS

## 2023-06-20 ASSESSMENT — ACTIVITIES OF DAILY LIVING (ADL)
FEEDING_COMMENTS: SEE OT NOTES
PHYSICAL_TRANSFERS_DEVICES: USE OF B UES TO ASSIST
AMBULATION ASSISTANCE: INDEPENDENT
BATHING_COMMENTS: SEE OT NOTES
GROOMING_COMMENTS: SEE OT NOTES
CURRENT_FUNCTION: INDEPENDENT
AMBULATION ASSISTANCE: 1
PHYSICAL TRANSFERS ASSESSED: 1

## 2023-06-20 ASSESSMENT — BALANCE ASSESSMENTS
SITTING DOWN: 1 - USES ARMS OR NOT SMOOTH MOTION
IMMEDIATE STANDING BALANCE FIRST 5 SECONDS: 2 - STEADY WITHOUT WALKER OR OTHER SUPPORT
ATTEMPTS TO ARISE: 2 - ABLE TO RISE, ONE ATTEMPT
STANDING BALANCE: 2 - NARROW STANCE WITHOUT SUPPORT
TURNING 360 DEGREES STEPS: 1 - CONTINUOUS STEPS
EYES CLOSED AT MAXIMUM POSITION NUDGED: 0 - UNSTEADY
NUDGED SCORE: 2
SITTING BALANCE: 1 - STEADY, SAFE
BALANCE SCORE: 14
NUDGED: 2 - STEADY
ARISING SCORE: 2
ARISES: 2 - ABLE WITHOUT USING ARMS

## 2023-06-20 ASSESSMENT — GAIT ASSESSMENTS
BALANCE AND GAIT SCORE: 24
GAIT SCORE: 10
STEP SYMMETRY: 1 - RIGHT AND LEFT STEP LENGTH APPEAR EQUAL
WALKING STANCE: 0 - HEELS APART
STEP CONTINUITY: 1 - STEPS APPEAR CONTINUOUS
INITIATION OF GAIT IMMEDIATELY AFTER GO: 1 - NO HESITANCY
PATH SCORE: 2
PATH: 2 - STRAIGHT WITHOUT WALKING AID
TRUNK: 1 - NO SWAY BUT FLEXION OF KNEES OR BACK OR SPREADS ARMS WHILE WALKING
TRUNK SCORE: 1

## 2023-06-20 ASSESSMENT — ENCOUNTER SYMPTOMS
PAIN SEVERITY GOAL: 0/10
PAIN LOCATION - PAIN SEVERITY: 2/10
PERSON REPORTING PAIN: PATIENT
PAIN LOCATION: ABDOMEN
PAIN: 1
PAIN LOCATION - PAIN QUALITY: ACHING
LOWEST PAIN SEVERITY IN PAST 24 HOURS: 0/10
PAIN LOCATION - RELIEVING FACTORS: REST, REPOSITIONING
PAIN LOCATION - PAIN DURATION: OCCASSIONALLY
HIGHEST PAIN SEVERITY IN PAST 24 HOURS: 3/10
SUBJECTIVE PAIN PROGRESSION: WAXING AND WANING
PAIN LOCATION - PAIN FREQUENCY: INTERMITTENT

## 2023-06-20 NOTE — DISCHARGE PLANNING
ATTN: Case Management  RE: Referral for Home Health    As of 6.13.23, we have accepted the Home Health referral for the patient listed above.    A [facility] clinician will be out to see the patient within 48 hours. If you have any questions or concerns regarding the patient’s transition to Home Health, please do not hesitate to contact us at [phone].      We look forward to collaborating with you,  [facility] Health Team    Adult

## 2023-06-21 ENCOUNTER — NON-PROVIDER VISIT (OUTPATIENT)
Dept: CARDIOLOGY | Facility: MEDICAL CENTER | Age: 88
End: 2023-06-21
Payer: MEDICARE

## 2023-06-21 ENCOUNTER — HOME CARE VISIT (OUTPATIENT)
Dept: HOME HEALTH SERVICES | Facility: HOME HEALTHCARE | Age: 88
End: 2023-06-21
Payer: MEDICARE

## 2023-06-21 VITALS
DIASTOLIC BLOOD PRESSURE: 70 MMHG | SYSTOLIC BLOOD PRESSURE: 120 MMHG | OXYGEN SATURATION: 94 % | RESPIRATION RATE: 18 BRPM | TEMPERATURE: 97.6 F | HEART RATE: 74 BPM

## 2023-06-21 PROCEDURE — 93294 REM INTERROG EVL PM/LDLS PM: CPT | Performed by: INTERNAL MEDICINE

## 2023-06-21 PROCEDURE — G0299 HHS/HOSPICE OF RN EA 15 MIN: HCPCS

## 2023-06-21 ASSESSMENT — ENCOUNTER SYMPTOMS
LAST BOWEL MOVEMENT: 66645
DENIES PAIN: 1
CONSTIPATION: 1
MUSCLE WEAKNESS: 1
STOOL FREQUENCY: DAILY

## 2023-06-21 NOTE — CASE COMMUNICATION
noted  ----- Message -----  From: Shanda Paz, PT  Sent: 6/20/2023   2:21 PM PDT  To: Lizbeth Sun R.N.; Krissy Beard, OT; *      PT eval completed, no further auth requested.  PT eval only, pt mobilizing well.

## 2023-06-22 ENCOUNTER — OFFICE VISIT (OUTPATIENT)
Dept: MEDICAL GROUP | Facility: LAB | Age: 88
End: 2023-06-22
Payer: MEDICARE

## 2023-06-22 VITALS
OXYGEN SATURATION: 96 % | DIASTOLIC BLOOD PRESSURE: 62 MMHG | RESPIRATION RATE: 16 BRPM | SYSTOLIC BLOOD PRESSURE: 119 MMHG | TEMPERATURE: 97.5 F | HEART RATE: 78 BPM

## 2023-06-22 VITALS
BODY MASS INDEX: 23.79 KG/M2 | OXYGEN SATURATION: 98 % | HEIGHT: 69 IN | SYSTOLIC BLOOD PRESSURE: 114 MMHG | RESPIRATION RATE: 16 BRPM | DIASTOLIC BLOOD PRESSURE: 70 MMHG | WEIGHT: 160.6 LBS | TEMPERATURE: 97.5 F | HEART RATE: 78 BPM

## 2023-06-22 DIAGNOSIS — E03.9 HYPOTHYROIDISM, UNSPECIFIED TYPE: ICD-10-CM

## 2023-06-22 DIAGNOSIS — I71.21 ANEURYSM OF ASCENDING AORTA WITHOUT RUPTURE (HCC): ICD-10-CM

## 2023-06-22 DIAGNOSIS — J98.01 BRONCHOSPASM: ICD-10-CM

## 2023-06-22 DIAGNOSIS — D64.9 ANEMIA, UNSPECIFIED TYPE: ICD-10-CM

## 2023-06-22 DIAGNOSIS — K43.6 VENTRAL HERNIA WITH BOWEL OBSTRUCTION: ICD-10-CM

## 2023-06-22 DIAGNOSIS — F33.42 RECURRENT MAJOR DEPRESSIVE DISORDER, IN FULL REMISSION (HCC): ICD-10-CM

## 2023-06-22 DIAGNOSIS — N40.0 PROSTATIC HYPERPLASIA: ICD-10-CM

## 2023-06-22 DIAGNOSIS — Z09 HOSPITAL DISCHARGE FOLLOW-UP: Primary | ICD-10-CM

## 2023-06-22 PROCEDURE — 3078F DIAST BP <80 MM HG: CPT | Performed by: FAMILY MEDICINE

## 2023-06-22 PROCEDURE — 99495 TRANSJ CARE MGMT MOD F2F 14D: CPT | Performed by: FAMILY MEDICINE

## 2023-06-22 PROCEDURE — 3074F SYST BP LT 130 MM HG: CPT | Performed by: FAMILY MEDICINE

## 2023-06-22 RX ORDER — ESCITALOPRAM OXALATE 10 MG/1
10 TABLET ORAL
Qty: 90 TABLET | Refills: 3 | Status: SHIPPED | OUTPATIENT
Start: 2023-06-22

## 2023-06-22 RX ORDER — TAMSULOSIN HYDROCHLORIDE 0.4 MG/1
0.4 CAPSULE ORAL
Qty: 90 CAPSULE | Refills: 3 | Status: SHIPPED | OUTPATIENT
Start: 2023-06-22

## 2023-06-22 RX ORDER — ALBUTEROL SULFATE 90 UG/1
2 AEROSOL, METERED RESPIRATORY (INHALATION) EVERY 6 HOURS PRN
Qty: 8.5 G | Refills: 0 | Status: SHIPPED | OUTPATIENT
Start: 2023-06-22 | End: 2023-12-18

## 2023-06-22 RX ORDER — LEVOTHYROXINE SODIUM 88 UG/1
88 TABLET ORAL
Qty: 90 TABLET | Refills: 3 | Status: SHIPPED | OUTPATIENT
Start: 2023-06-22

## 2023-06-22 ASSESSMENT — ACTIVITIES OF DAILY LIVING (ADL)
DRESSING_LB_CURRENT_FUNCTION: INDEPENDENT
TOILETING: INDEPENDENT
AMBULATION ASSISTANCE: INDEPENDENT
FEEDING: INDEPENDENT
ORAL_CARE_CURRENT_FUNCTION: INDEPENDENT
GROOMING_WITHIN_DEFINED_LIMITS: 1
AMBULATION ASSISTANCE: 1
FEEDING_WITHIN_DEFINED_LIMITS: 1
FEEDING ASSESSED: 1
DRESSING_UB_CURRENT_FUNCTION: INDEPENDENT
PHYSICAL TRANSFERS ASSESSED: 1
ORAL_CARE_ASSESSED: 1
GROOMING ASSESSED: 1
CURRENT_FUNCTION: INDEPENDENT
TOILETING: 1
GROOMING_CURRENT_FUNCTION: INDEPENDENT

## 2023-06-22 ASSESSMENT — ENCOUNTER SYMPTOMS
DENIES PAIN: 1
DIFFICULTY THINKING: 1
PERSON REPORTING PAIN: PATIENT

## 2023-06-22 ASSESSMENT — FIBROSIS 4 INDEX: FIB4 SCORE: 2.52

## 2023-06-22 NOTE — CARDIAC REMOTE MONITOR - SCAN
Device transmission reviewed. Device demonstrated appropriate function.       Electronically Signed by: Mohsen Fulton M.D.    6/22/2023  9:41 AM

## 2023-06-23 ENCOUNTER — HOME CARE VISIT (OUTPATIENT)
Dept: HOME HEALTH SERVICES | Facility: HOME HEALTHCARE | Age: 88
End: 2023-06-23
Payer: MEDICARE

## 2023-06-23 NOTE — CASE COMMUNICATION
noted  ----- Message -----  From: Krissy Beard, OT  Sent: 6/22/2023  12:51 PM PDT  To: Lizbeth Sun R.N.; Ross Smith; *      OT evaluation completed 6/20/23. No further auth requested.

## 2023-06-26 ENCOUNTER — HOME CARE VISIT (OUTPATIENT)
Dept: HOME HEALTH SERVICES | Facility: HOME HEALTHCARE | Age: 88
End: 2023-06-26
Payer: MEDICARE

## 2023-06-26 ASSESSMENT — ACTIVITIES OF DAILY LIVING (ADL): OASIS_M1830: 05

## 2023-06-26 NOTE — CASE COMMUNICATION
Quality Review Completed for 6/17 SOC OASIS by REED Villaseñor RN on 6/26/2023:   Edits completed by REED Villaseñor RN:     1.  and  diagnosis coding updated per chart review  2.  changed to 6/17 for LSOC ordered  3.  is newly epithelialized per wound guidelines, less than 30 days old and healing by primary intention without complications  4.  changed to #3 per functional limitation documentation  5. Per narrative functi onal status report of supervision with transfers, ambulation and min level with bathing, dressing, the following were changed, , 1810, 1820, 1845, 1850 to 2.  is 5 and  is 3  6.  changed to therapeutic diet ordered at [place] is a full liquid diet  7. PH5974 E, H, G, F, changed to 3 for min assist with dressing per narrative  8. CS4766 B, C, changed to 4 supervision per narrative  9. WR4657 D E F I J K changed to supervis ion performance per narrative.   10.  A1. Chemotherapy A3. oral for Relugolix for prostate CA  11.  changed to 3 per guidelines when ambulation is supervised  12. Checked ambulate only w/assist to safety measures, full liquid diet on nutritional requirements, and F2F details on 485 forms  13.  changed to 2 per therapy              Vitals stable. Mild pain controlled with PRN Tylenol. Able to tolerate PO intake with no increased pain or nausea/vomiting. Patient found to be adequate for discharge. Questions answered and encouraged. Patient discharged home in care of family.

## 2023-06-27 ENCOUNTER — TELEPHONE (OUTPATIENT)
Dept: MEDICAL GROUP | Facility: LAB | Age: 88
End: 2023-06-27

## 2023-06-27 ENCOUNTER — HOSPITAL ENCOUNTER (OUTPATIENT)
Facility: MEDICAL CENTER | Age: 88
End: 2023-06-27
Attending: FAMILY MEDICINE
Payer: MEDICARE

## 2023-06-27 ENCOUNTER — HOME CARE VISIT (OUTPATIENT)
Dept: HOME HEALTH SERVICES | Facility: HOME HEALTHCARE | Age: 88
End: 2023-06-27
Payer: MEDICARE

## 2023-06-27 VITALS
HEART RATE: 76 BPM | DIASTOLIC BLOOD PRESSURE: 60 MMHG | OXYGEN SATURATION: 97 % | TEMPERATURE: 97.6 F | RESPIRATION RATE: 18 BRPM | SYSTOLIC BLOOD PRESSURE: 110 MMHG

## 2023-06-27 LAB
BASOPHILS # BLD AUTO: 1.1 % (ref 0–1.8)
BASOPHILS # BLD: 0.07 K/UL (ref 0–0.12)
EOSINOPHIL # BLD AUTO: 0.09 K/UL (ref 0–0.51)
EOSINOPHIL NFR BLD: 1.4 % (ref 0–6.9)
ERYTHROCYTE [DISTWIDTH] IN BLOOD BY AUTOMATED COUNT: 45.8 FL (ref 35.9–50)
HCT VFR BLD AUTO: 36.8 % (ref 42–52)
HGB BLD-MCNC: 12.2 G/DL (ref 14–18)
IMM GRANULOCYTES # BLD AUTO: 0.02 K/UL (ref 0–0.11)
IMM GRANULOCYTES NFR BLD AUTO: 0.3 % (ref 0–0.9)
LYMPHOCYTES # BLD AUTO: 1.13 K/UL (ref 1–4.8)
LYMPHOCYTES NFR BLD: 17.6 % (ref 22–41)
MCH RBC QN AUTO: 30.4 PG (ref 27–33)
MCHC RBC AUTO-ENTMCNC: 33.2 G/DL (ref 32.3–36.5)
MCV RBC AUTO: 91.8 FL (ref 81.4–97.8)
MONOCYTES # BLD AUTO: 0.76 K/UL (ref 0–0.85)
MONOCYTES NFR BLD AUTO: 11.8 % (ref 0–13.4)
NEUTROPHILS # BLD AUTO: 4.36 K/UL (ref 1.82–7.42)
NEUTROPHILS NFR BLD: 67.8 % (ref 44–72)
NRBC # BLD AUTO: 0 K/UL
NRBC BLD-RTO: 0 /100 WBC (ref 0–0.2)
PLATELET # BLD AUTO: 325 K/UL (ref 164–446)
PMV BLD AUTO: 12.6 FL (ref 9–12.9)
RBC # BLD AUTO: 4.01 M/UL (ref 4.7–6.1)
WBC # BLD AUTO: 6.4 K/UL (ref 4.8–10.8)

## 2023-06-27 PROCEDURE — G0495 RN CARE TRAIN/EDU IN HH: HCPCS

## 2023-06-27 PROCEDURE — G0180 MD CERTIFICATION HHA PATIENT: HCPCS | Performed by: FAMILY MEDICINE

## 2023-06-27 PROCEDURE — 85025 COMPLETE CBC W/AUTO DIFF WBC: CPT

## 2023-06-27 ASSESSMENT — ENCOUNTER SYMPTOMS
CRAMPS: 1
CONSTIPATION: 1
LAST BOWEL MOVEMENT: 66652
PERSON REPORTING PAIN: PATIENT
DENIES PAIN: 1
STOOL FREQUENCY: DAILY

## 2023-06-27 NOTE — CASE COMMUNICATION
I agree with these changes  ----- Message -----  From: Kasia Villaseñor R.N.  Sent: 6/26/2023   1:47 PM PDT  To: Kaity Cohn R.N.      Quality Review Completed for 6/17 SOC OASIS by REED Villaseñor, RN on 6/26/2023:   Edits completed by REED Villaseñor RN:     1.  and  diagnosis coding updated per chart review  2.  changed to 6/17 for LSOC ordered  3.  is newly epithelialized per wound guidelines, less than 30 days old a nd healing by primary intention without complications  4.  changed to #3 per functional limitation documentation  5. Per narrative functional status report of supervision with transfers, ambulation and min level with bathing, dressing, the following were changed, , 1810, 1820, 1845, 1850 to 2.  is 5 and  is 3  6.  changed to therapeutic diet ordered at [place] is a full liquid diet  7. WP1124 E, H, G, F, changed to 3  for min assist with dressing per narrative  8. LE4151 B, C, changed to 4 supervision per narrative  9. [id number] D E F I J K changed to supervision performance per narrative.   10.  A1. Chemotherapy A3. oral for Relugolix for prostate CA  11.  changed to 3 per guidelines when ambulation is supervised  12. Checked ambulate only w/assist to safety measures, full liquid diet on nutritional requirements, and F2F details on 485 forms   13.  changed to 2 per therapy

## 2023-06-27 NOTE — TELEPHONE ENCOUNTER
----- Message from Joseph Best sent at 6/26/2023 11:26 AM PDT -----  Patient called and said he thought he left behind a DMV envelope and hand written notes on Thursday, 6/22 when he visited w/ the doctor.  Please call if you find it.  Thank you!

## 2023-06-29 ENCOUNTER — HOME CARE VISIT (OUTPATIENT)
Dept: HOME HEALTH SERVICES | Facility: HOME HEALTHCARE | Age: 88
End: 2023-06-29
Payer: MEDICARE

## 2023-06-29 VITALS
RESPIRATION RATE: 16 BRPM | TEMPERATURE: 97.6 F | OXYGEN SATURATION: 98 % | DIASTOLIC BLOOD PRESSURE: 70 MMHG | SYSTOLIC BLOOD PRESSURE: 108 MMHG | HEART RATE: 79 BPM

## 2023-06-29 PROCEDURE — G0493 RN CARE EA 15 MIN HH/HOSPICE: HCPCS

## 2023-06-29 ASSESSMENT — ENCOUNTER SYMPTOMS: DENIES PAIN: 1

## 2023-06-29 ASSESSMENT — ACTIVITIES OF DAILY LIVING (ADL)
HOME_HEALTH_OASIS: 00
OASIS_M1830: 00

## 2023-06-30 ASSESSMENT — ENCOUNTER SYMPTOMS
MUSCLE WEAKNESS: 1
DEPRESSED MOOD: 1

## 2023-07-03 ENCOUNTER — HOME CARE VISIT (OUTPATIENT)
Dept: HOME HEALTH SERVICES | Facility: HOME HEALTHCARE | Age: 88
End: 2023-07-03
Payer: MEDICARE

## 2023-07-03 NOTE — CASE COMMUNICATION
Quality Review for 6.29.23 AL OASIS performed on by JIMMIE Lira RN on 7.3.2023:     Edits completed by JIMMIE Lira RN:  1. Added D to , pt has his copy from   2. Changed  A1, A3 to yes per MAR

## 2023-07-04 NOTE — CASE COMMUNICATION
I agree with changes  ----- Message -----  From: Nell Lira R.N.  Sent: 7/3/2023   3:36 PM PDT  To: Lizbeth Sun R.N.         Quality Review for 6.29.23 DC OASIS performed on by JIMMIE Lira RN on 7.3.2023:     Edits completed by JIMMIE Lira RN:  1. Added D to , pt has his copy from   2. Changed  A1, A3 to yes per MAR

## 2023-07-14 ENCOUNTER — OFFICE VISIT (OUTPATIENT)
Dept: URGENT CARE | Facility: CLINIC | Age: 88
End: 2023-07-14
Payer: MEDICARE

## 2023-07-14 ENCOUNTER — HOSPITAL ENCOUNTER (OUTPATIENT)
Facility: MEDICAL CENTER | Age: 88
End: 2023-07-14
Attending: FAMILY MEDICINE
Payer: MEDICARE

## 2023-07-14 VITALS
HEART RATE: 84 BPM | BODY MASS INDEX: 24.55 KG/M2 | RESPIRATION RATE: 16 BRPM | HEIGHT: 68 IN | SYSTOLIC BLOOD PRESSURE: 148 MMHG | WEIGHT: 162 LBS | TEMPERATURE: 97.8 F | DIASTOLIC BLOOD PRESSURE: 82 MMHG | OXYGEN SATURATION: 97 %

## 2023-07-14 DIAGNOSIS — L03.211 CELLULITIS, FACE: ICD-10-CM

## 2023-07-14 DIAGNOSIS — L02.01 ABSCESS OF FACE: ICD-10-CM

## 2023-07-14 PROCEDURE — 87186 SC STD MICRODIL/AGAR DIL: CPT

## 2023-07-14 PROCEDURE — 87070 CULTURE OTHR SPECIMN AEROBIC: CPT

## 2023-07-14 PROCEDURE — 99213 OFFICE O/P EST LOW 20 MIN: CPT | Performed by: FAMILY MEDICINE

## 2023-07-14 PROCEDURE — 87077 CULTURE AEROBIC IDENTIFY: CPT

## 2023-07-14 PROCEDURE — 3077F SYST BP >= 140 MM HG: CPT | Performed by: FAMILY MEDICINE

## 2023-07-14 PROCEDURE — 3079F DIAST BP 80-89 MM HG: CPT | Performed by: FAMILY MEDICINE

## 2023-07-14 PROCEDURE — 87205 SMEAR GRAM STAIN: CPT

## 2023-07-14 RX ORDER — SULFAMETHOXAZOLE AND TRIMETHOPRIM 800; 160 MG/1; MG/1
1 TABLET ORAL 2 TIMES DAILY
Qty: 14 TABLET | Refills: 0 | Status: SHIPPED | OUTPATIENT
Start: 2023-07-14 | End: 2023-10-31

## 2023-07-14 RX ORDER — CEPHALEXIN 500 MG/1
500 CAPSULE ORAL 4 TIMES DAILY
Qty: 28 CAPSULE | Refills: 0 | Status: SHIPPED | OUTPATIENT
Start: 2023-07-14 | End: 2023-07-21

## 2023-07-14 ASSESSMENT — ENCOUNTER SYMPTOMS
NAUSEA: 0
SORE THROAT: 0
CHILLS: 0
COUGH: 0
MYALGIAS: 0
FEVER: 0
DIZZINESS: 0
VOMITING: 0
SHORTNESS OF BREATH: 0

## 2023-07-14 ASSESSMENT — FIBROSIS 4 INDEX: FIB4 SCORE: 1.67

## 2023-07-15 DIAGNOSIS — L02.01 ABSCESS OF FACE: ICD-10-CM

## 2023-07-15 DIAGNOSIS — L03.211 CELLULITIS, FACE: ICD-10-CM

## 2023-07-15 NOTE — PATIENT INSTRUCTIONS
Cellulitis, Adult    Cellulitis is a skin infection. The infected area is often warm, red, swollen, and sore. It occurs most often in the arms and lower legs. It is very important to get treated for this condition.  What are the causes?  This condition is caused by bacteria. The bacteria enter through a break in the skin, such as a cut, burn, insect bite, open sore, or crack.  What increases the risk?  This condition is more likely to occur in people who:  Have a weak body defense system (immune system).  Have open cuts, burns, bites, or scrapes on the skin.  Are older than 60 years of age.  Have a blood sugar problem (diabetes).  Have a long-lasting (chronic) liver disease (cirrhosis) or kidney disease.  Are very overweight (obese).  Have a skin problem, such as:  Itchy rash (eczema).  Slow movement of blood in the veins (venous stasis).  Fluid buildup below the skin (edema).  Have been treated with high-energy rays (radiation).  Use IV drugs.  What are the signs or symptoms?  Symptoms of this condition include:  Skin that is:  Red.  Streaking.  Spotting.  Swollen.  Sore or painful when you touch it.  Warm.  A fever.  Chills.  Blisters.  How is this diagnosed?  This condition is diagnosed based on:  Medical history.  Physical exam.  Blood tests.  Imaging tests.  How is this treated?  Treatment for this condition may include:  Medicines to treat infections or allergies.  Home care, such as:  Rest.  Placing cold or warm cloths (compresses) on the skin.  Hospital care, if the condition is very bad.  Follow these instructions at home:  Medicines  Take over-the-counter and prescription medicines only as told by your doctor.  If you were prescribed an antibiotic medicine, take it as told by your doctor. Do not stop taking it even if you start to feel better.  General instructions    Drink enough fluid to keep your pee (urine) pale yellow.  Do not touch or rub the infected area.  Raise (elevate) the infected area above  the level of your heart while you are sitting or lying down.  Place cold or warm cloths on the area as told by your doctor.  Keep all follow-up visits as told by your doctor. This is important.  Contact a doctor if:  You have a fever.  You do not start to get better after 1-2 days of treatment.  Your bone or joint under the infected area starts to hurt after the skin has healed.  Your infection comes back. This can happen in the same area or another area.  You have a swollen bump in the area.  You have new symptoms.  You feel ill and have muscle aches and pains.  Get help right away if:  Your symptoms get worse.  You feel very sleepy.  You throw up (vomit) or have watery poop (diarrhea) for a long time.  You see red streaks coming from the area.  Your red area gets larger.  Your red area turns dark in color.  These symptoms may represent a serious problem that is an emergency. Do not wait to see if the symptoms will go away. Get medical help right away. Call your local emergency services (911 in the [place] Do not drive yourself to the hospital.  Summary  Cellulitis is a skin infection. The area is often warm, red, swollen, and sore.  This condition is treated with medicines, rest, and cold and warm cloths.  Take all medicines only as told by your doctor.  Tell your doctor if symptoms do not start to get better after 1-2 days of treatment.  This information is not intended to replace advice given to you by your health care provider. Make sure you discuss any questions you have with your health care provider.  Document Revised: 09/29/2022 Document Reviewed: 09/29/2022  Elsevier Patient Education © 2023 [employer]

## 2023-07-15 NOTE — PROGRESS NOTES
Subjective:   David Darling is a 87 y.o. male who presents for Wound Infection (Patient states a type of ingrown whisker hair right below nose x 1 week, redness, puss, inflamation)        Rash  Chronicity: Reports redness and swelling perrinasal and supra right labial over the past week. The current episode started in the past 7 days. The problem has been gradually worsening since onset. The affected locations include the face. The rash is characterized by pain, redness and swelling. Pertinent negatives include no cough, fever, shortness of breath, sore throat or vomiting. Treatments tried: routine skin hygeine. The treatment provided mild relief.     PMH:  has a past medical history of Allergy, Anemia, Arrhythmia, Bronchitis (2019), Cancer ([facility]) (2010), COPD (chronic obstructive pulmonary disease) ([facility]), Macular degeneration, Muscle disorder, Psychiatric problem, Sinusitis, chronic, and Strep throat.  MEDS:   Current Outpatient Medications:     cephALEXin (KEFLEX) 500 MG Cap, Take 1 Capsule by mouth 4 times a day for 7 days., Disp: 28 Capsule, Rfl: 0    sulfamethoxazole-trimethoprim (BACTRIM DS) 800-160 MG tablet, Take 1 Tablet by mouth 2 times a day., Disp: 14 Tablet, Rfl: 0    mupirocin (BACTROBAN) 2 % Ointment, Apply 1 Application topically 2 times a day., Disp: 22 g, Rfl: 0    albuterol 108 (90 Base) MCG/ACT Aero Soln inhalation aerosol, Inhale 2 Puffs every 6 hours as needed for Shortness of Breath. Indications: Spasm of Lung Air Passages, Disp: 8.5 g, Rfl: 0    levothyroxine (SYNTHROID) 88 MCG Tab, Take 1 Tablet by mouth every morning on an empty stomach. Indications: Underactive Thyroid, Disp: 90 Tablet, Rfl: 3    tamsulosin (FLOMAX) 0.4 MG capsule, Take 1 Capsule by mouth at bedtime. Indications: Chronic Prostate Gland Inflammation, Disp: 90 Capsule, Rfl: 3    escitalopram (LEXAPRO) 10 MG Tab, Take 1 Tablet by mouth every day. Indications: Major Depressive Disorder, Disp: 90 Tablet, Rfl: 3    polyethylene  glycol 3350 (MIRALAX) 17 GM/SCOOP Powder, Take 17 g by mouth 1 time a day as needed (constipation)., Disp: , Rfl:     Cholecalciferol (VITAMIN D3) 25 MCG (1000 UT) Cap, Take 1 Capsule by mouth every day at 6 PM. Indications: Suppliments, Disp: , Rfl:     EVENING PRIMROSE OIL PO, Take 1,500 Capsules by mouth one time as needed (suppliment). Indications: suppliment, Disp: , Rfl:     Green Tea, Silvia sinensis, ([employer] GREEN TEA EXTRACT PO), Take 1 Capsule by mouth 1 time a day as needed (suppliment). Indications: Suppliment, Disp: , Rfl:     ondansetron (ZOFRAN ODT) 4 MG TABLET DISPERSIBLE, Take 1 Tablet by mouth every four hours as needed for Nausea/Vomiting (give PO if IV route is unavailable.)., Disp: 10 Tablet, Rfl: 0    multivitamin Tab, Take 1 Tablet by mouth every evening. Indications: Nutritional Support, Disp: , Rfl:     Multiple Vitamins-Minerals (PRESERVISION AREDS 2) Cap, Take 1 Capsule by mouth 2 times a day. Indications: suppliment, Disp: , Rfl:     Relugolix (ORGOVYX) 120 MG Tab, Take 120 mg by mouth every day. Indications: Cancer of the Prostate Gland, Disp: , Rfl:   ALLERGIES:   Allergies   Allergen Reactions    Azithromycin     Augmentin [Amoxicillin-Pot Clavulanate] Vomiting     SURGHX:   Past Surgical History:   Procedure Laterality Date    [place] EXPLORATORY OF ABDOMEN  6/10/2023    Procedure: LAPAROTOMY, EXPLORATORY - INCARCERATED HERNIA, BOWEL RESECTION;  Surgeon: Darius Sánchez M.D.;  Location: SURGERY [facility];  Service: General    KNEE ARTHROSCOPY       SOCHX:  reports that he has never smoked. He has never used smokeless tobacco. He reports current drug use. Drug: Oral. He reports that he does not drink alcohol.  FH:   Family History   Problem Relation Age of Onset    Cancer Mother     Heart Disease Father     Cancer Father     Stroke Sister      Review of Systems   Constitutional:  Negative for chills and fever.   HENT:  Negative for sore throat.    Respiratory:  Negative for cough and  "shortness of breath.    Gastrointestinal:  Negative for nausea and vomiting.   Musculoskeletal:  Negative for myalgias.   Skin:  Positive for rash.   Neurological:  Negative for dizziness.        Objective:   BP (!) 148/82 (BP Location: Left arm, Patient Position: Sitting, BP Cuff Size: Large adult)   Pulse 84   Temp 36.6 °C (97.8 °F)   Resp 16   Ht 1.727 m (5' 8\")   Wt 73.5 kg (162 lb)   SpO2 97%   BMI 24.63 kg/m²   Physical Exam  Vitals and nursing note reviewed.   Constitutional:       General: He is not in acute distress.     Appearance: He is well-developed.   HENT:      Head: Normocephalic and atraumatic.        Right Ear: External ear normal.      Left Ear: External ear normal.      Nose: Nose normal.      Mouth/Throat:      Mouth: Mucous membranes are moist.   Eyes:      Conjunctiva/sclera: Conjunctivae normal.   Cardiovascular:      Rate and Rhythm: Normal rate.   Pulmonary:      Effort: Pulmonary effort is normal. No respiratory distress.      Breath sounds: Normal breath sounds.   Abdominal:      General: There is no distension.   Musculoskeletal:         General: Normal range of motion.   Skin:     General: Skin is warm and dry.   Neurological:      General: No focal deficit present.      Mental Status: He is alert and oriented to person, place, and time. Mental status is at baseline.      Gait: Gait (gait at baseline) normal.   Psychiatric:         Judgment: Judgment normal.           Assessment/Plan:   1. Cellulitis, face  - cephALEXin (KEFLEX) 500 MG Cap; Take 1 Capsule by mouth 4 times a day for 7 days.  Dispense: 28 Capsule; Refill: 0  - sulfamethoxazole-trimethoprim (BACTRIM DS) 800-160 MG tablet; Take 1 Tablet by mouth 2 times a day.  Dispense: 14 Tablet; Refill: 0  - mupirocin (BACTROBAN) 2 % Ointment; Apply 1 Application topically 2 times a day.  Dispense: 22 g; Refill: 0  - CULTURE WOUND W/ GRAM STAIN; Future    2. Abscess of face  - cephALEXin (KEFLEX) 500 MG Cap; Take 1 Capsule by " mouth 4 times a day for 7 days.  Dispense: 28 Capsule; Refill: 0  - sulfamethoxazole-trimethoprim (BACTRIM DS) 800-160 MG tablet; Take 1 Tablet by mouth 2 times a day.  Dispense: 14 Tablet; Refill: 0  - mupirocin (BACTROBAN) 2 % Ointment; Apply 1 Application topically 2 times a day.  Dispense: 22 g; Refill: 0  - CULTURE WOUND W/ GRAM STAIN; Future        Medical Decision Making/Course:  In the course of preparing for this visit with review of the pertinent past medical history, recent and past clinic visits, current medications, and performing chart, immunization, medical history and medication reconciliation, and in the further course of obtaining the current history pertinent to the clinic visit today, performing an exam and evaluation, ordering and independently evaluating labs, tests including wound culture, and/or procedures including drainage of abscess via manual pressure during the urgent care course after which the patient verbalized symptomatic relief, prescribing any recommended new medications as noted above, providing any pertinent counseling and education and recommending further coordination of care including recommendations for symptomatic and supportive measures and recommendations return for any persistent worsening symptoms including warmth compress and manual pressure on periphery of abscess to express further purulent exudate as tolerated, at least  35 minutes of total time were spent during this encounter.      Discussed close monitoring, return precautions, and supportive measures of maintaining adequate fluid hydration and caloric intake, relative rest and symptom management as needed for pain and/or fever.    Differential diagnosis, natural history, supportive care, and indications for immediate follow-up discussed.     Advised the patient to follow-up with the primary care physician for recheck, reevaluation, and consideration of further management.    Please note that this dictation was  created using voice recognition software. I have worked with consultants from the vendor as well as technical experts from [facility] to optimize the interface. I have made every reasonable attempt to correct obvious errors, but I expect that there are errors of grammar and possibly content that I did not discover before finalizing the note.

## 2023-07-16 LAB
GRAM STN SPEC: NORMAL
SIGNIFICANT IND 70042: NORMAL
SITE SITE: NORMAL
SOURCE SOURCE: NORMAL

## 2023-07-17 LAB
BACTERIA WND AEROBE CULT: ABNORMAL
BACTERIA WND AEROBE CULT: ABNORMAL
GRAM STN SPEC: ABNORMAL
SIGNIFICANT IND 70042: ABNORMAL
SITE SITE: ABNORMAL
SOURCE SOURCE: ABNORMAL

## 2023-07-26 ENCOUNTER — OFFICE VISIT (OUTPATIENT)
Dept: MEDICAL GROUP | Facility: LAB | Age: 88
End: 2023-07-26
Payer: MEDICARE

## 2023-07-26 ENCOUNTER — TELEPHONE (OUTPATIENT)
Dept: MEDICAL GROUP | Facility: LAB | Age: 88
End: 2023-07-26

## 2023-07-26 VITALS
HEIGHT: 68 IN | WEIGHT: 162.7 LBS | DIASTOLIC BLOOD PRESSURE: 62 MMHG | HEART RATE: 88 BPM | BODY MASS INDEX: 24.66 KG/M2 | TEMPERATURE: 97.5 F | SYSTOLIC BLOOD PRESSURE: 138 MMHG | OXYGEN SATURATION: 94 % | RESPIRATION RATE: 16 BRPM

## 2023-07-26 DIAGNOSIS — E03.9 HYPOTHYROIDISM, UNSPECIFIED TYPE: ICD-10-CM

## 2023-07-26 DIAGNOSIS — R00.1 SINUS BRADYCARDIA: ICD-10-CM

## 2023-07-26 PROCEDURE — 3078F DIAST BP <80 MM HG: CPT | Performed by: STUDENT IN AN ORGANIZED HEALTH CARE EDUCATION/TRAINING PROGRAM

## 2023-07-26 PROCEDURE — 99214 OFFICE O/P EST MOD 30 MIN: CPT | Performed by: STUDENT IN AN ORGANIZED HEALTH CARE EDUCATION/TRAINING PROGRAM

## 2023-07-26 PROCEDURE — 3075F SYST BP GE 130 - 139MM HG: CPT | Performed by: STUDENT IN AN ORGANIZED HEALTH CARE EDUCATION/TRAINING PROGRAM

## 2023-07-26 ASSESSMENT — ENCOUNTER SYMPTOMS
SHORTNESS OF BREATH: 0
CHILLS: 0
FEVER: 0

## 2023-07-26 ASSESSMENT — FIBROSIS 4 INDEX: FIB4 SCORE: 1.67

## 2023-07-26 NOTE — LETTER
July 26, 2023        David Darling  [address]  [place] [address]            Current Outpatient Medications:     sulfamethoxazole-trimethoprim, 1 Tablet, Oral, BID    mupirocin, 1 Application, Topical, BID    albuterol, 2 Puff, Inhalation, Q6HRS PRN    levothyroxine, 88 mcg, Oral, AM ES    tamsulosin, 0.4 mg, Oral, QHS    escitalopram, 10 mg, Oral, QDAY    polyethylene glycol 3350, 17 g, Oral, QDAY PRN    Vitamin D3, 1 Capsule, Oral, DAILY AT 1800    EVENING PRIMROSE OIL PO, 1,500 Capsule, Oral, Once PRN    Green Tea, Silvia sinensis, ([employer] GREEN TEA EXTRACT PO), 1 Capsule, Oral, QDAY PRN    ondansetron, 4 mg, Oral, Q4HRS PRN    multivitamin, 1 Tablet, Oral, Q EVENING    PreserVision AREDS 2, 1 Capsule, Oral, BID    Orgovyx, 120 mg, Oral, DAILY     If you have any questions or concerns, please don't hesitate to call.        Sincerely,        Mohsen Flores M.D.    Electronically Signed

## 2023-07-26 NOTE — PROGRESS NOTES
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 TSH within target range from May 2023         Current Outpatient Medications Ordered in Epic   Medication Sig Dispense Refill    sulfamethoxazole-trimethoprim (BACTRIM DS) 800-160 MG tablet Take 1 Tablet by mouth 2 times a day. 14 Tablet 0    mupirocin (BACTROBAN) 2 % Ointment Apply 1 Application topically 2 times a day. 22 g 0    albuterol 108 (90 Base) MCG/ACT Aero Soln inhalation aerosol Inhale 2 Puffs every 6 hours as needed for Shortness of Breath. Indications: Spasm of Lung Air Passages 8.5 g 0    levothyroxine (SYNTHROID) 88 MCG Tab Take 1 Tablet by mouth every morning on an empty stomach. Indications: Underactive Thyroid 90 Tablet 3    tamsulosin (FLOMAX) 0.4 MG capsule Take 1 Capsule by mouth at bedtime. Indications: Chronic Prostate Gland Inflammation 90 Capsule 3    escitalopram (LEXAPRO) 10 MG Tab Take 1 Tablet by mouth every day. Indications: Major Depressive Disorder 90 Tablet 3    polyethylene glycol 3350 (MIRALAX) 17 GM/SCOOP Powder Take 17 g by mouth 1 time a day as needed (constipation).      Cholecalciferol (VITAMIN D3) 25 MCG (1000 UT) Cap Take 1 Capsule by mouth every day at 6 PM. Indications: Suppliments      EVENING PRIMROSE OIL PO Take 1,500 Capsules by mouth one time as needed (suppliment). Indications: suppliment      Green Tea, Silvia sinensis, ([employer] GREEN TEA EXTRACT PO) Take 1 Capsule by mouth 1 time a day as needed (suppliment). Indications: Suppliment      ondansetron (ZOFRAN ODT) 4 MG TABLET DISPERSIBLE Take 1 Tablet by mouth every four hours as needed for Nausea/Vomiting (give PO if IV route is unavailable.). 10 Tablet 0    multivitamin Tab Take 1 Tablet by mouth every evening. Indications: Nutritional Support      Multiple Vitamins-Minerals (PRESERVISION  "AREDS 2) Cap Take 1 Capsule by mouth 2 times a day. Indications: suppliment      Relugolix (ORGOVYX) 120 MG Tab Take 120 mg by mouth every day. Indications: Cancer of the Prostate Gland       No current [facility]-ordered facility-administered medications on file.           ROS:  Review of Systems   Constitutional:  Negative for chills and fever.   Respiratory:  Negative for shortness of breath.    Cardiovascular:  Negative for chest pain.       Objective:     Exam:  /62 (BP Location: Right arm, Patient Position: Sitting, BP Cuff Size: Adult long)   Pulse 88   Temp 36.4 °C (97.5 °F)   Resp 16   Ht 1.727 m (5' 8\")   Wt 73.8 kg (162 lb 11.2 oz)   SpO2 94%   BMI 24.74 kg/m²  Body mass index is 24.74 kg/m².    Physical Exam  Constitutional:       General: He is not in acute distress.     Appearance: He is not ill-appearing.   Pulmonary:      Effort: Pulmonary effort is normal.   Neurological:      Mental Status: He is alert.   Psychiatric:         Mood and Affect: Mood normal.         Behavior: Behavior normal.         Thought Content: Thought content normal.         Judgment: Judgment normal.               Assessment & Plan:     Problem List Items Addressed This Visit       Hypothyroidism     Chronic  -continue levothyroxine 88mcg          Sinus bradycardia     Chronic-controlled             Patient is on no sedating medications started.  His eyes were checked recently, and he will be picking up a new glasses today.  He has had no episodes of syncope over the past 3 years, no history of seizures.  He has also had no automobile accidents    Please note that this dictation was created using voice recognition software. I have made every reasonable attempt to correct obvious errors, but I expect that there are errors of grammar and possibly content that I did not discover before finalizing the note.        "

## 2023-08-02 ENCOUNTER — HOSPITAL ENCOUNTER (OUTPATIENT)
Dept: LAB | Facility: MEDICAL CENTER | Age: 88
End: 2023-08-02
Attending: PHYSICIAN ASSISTANT
Payer: MEDICARE

## 2023-08-02 LAB
ALBUMIN SERPL BCP-MCNC: 4.4 G/DL (ref 3.2–4.9)
ALBUMIN/GLOB SERPL: 1.8 G/DL
ALP SERPL-CCNC: 81 U/L (ref 30–99)
ALT SERPL-CCNC: 16 U/L (ref 2–50)
ANION GAP SERPL CALC-SCNC: 12 MMOL/L (ref 7–16)
AST SERPL-CCNC: 20 U/L (ref 12–45)
BILIRUB SERPL-MCNC: 0.6 MG/DL (ref 0.1–1.5)
BUN SERPL-MCNC: 22 MG/DL (ref 8–22)
CALCIUM ALBUM COR SERPL-MCNC: 9.4 MG/DL (ref 8.5–10.5)
CALCIUM SERPL-MCNC: 9.7 MG/DL (ref 8.4–10.2)
CHLORIDE SERPL-SCNC: 106 MMOL/L (ref 96–112)
CO2 SERPL-SCNC: 22 MMOL/L (ref 20–33)
CREAT SERPL-MCNC: 0.85 MG/DL (ref 0.5–1.4)
GFR SERPLBLD CREATININE-BSD FMLA CKD-EPI: 84 ML/MIN/1.73 M 2
GLOBULIN SER CALC-MCNC: 2.4 G/DL (ref 1.9–3.5)
GLUCOSE SERPL-MCNC: 97 MG/DL (ref 65–99)
POTASSIUM SERPL-SCNC: 4.4 MMOL/L (ref 3.6–5.5)
PROT SERPL-MCNC: 6.8 G/DL (ref 6–8.2)
PSA SERPL-MCNC: 0.11 NG/ML (ref 0–4)
SODIUM SERPL-SCNC: 140 MMOL/L (ref 135–145)

## 2023-08-02 PROCEDURE — 80053 COMPREHEN METABOLIC PANEL: CPT

## 2023-08-02 PROCEDURE — 84403 ASSAY OF TOTAL TESTOSTERONE: CPT

## 2023-08-02 PROCEDURE — 36415 COLL VENOUS BLD VENIPUNCTURE: CPT

## 2023-08-02 PROCEDURE — 84153 ASSAY OF PSA TOTAL: CPT

## 2023-08-03 LAB — TESTOST SERPL-MCNC: <12 NG/DL (ref 175–781)

## 2023-09-20 ENCOUNTER — NON-PROVIDER VISIT (OUTPATIENT)
Dept: CARDIOLOGY | Facility: MEDICAL CENTER | Age: 88
End: 2023-09-20
Payer: MEDICARE

## 2023-09-20 ENCOUNTER — TELEPHONE (OUTPATIENT)
Dept: CARDIOLOGY | Facility: MEDICAL CENTER | Age: 88
End: 2023-09-20
Payer: MEDICARE

## 2023-09-20 PROCEDURE — 93294 REM INTERROG EVL PM/LDLS PM: CPT | Performed by: INTERNAL MEDICINE

## 2023-09-20 NOTE — TELEPHONE ENCOUNTER
Mohsen Fulton M.D.  You; Anastasiia Byers, Med Ass't 2 minutes ago (2:43 PM)       Recommend urgent follow[facility] with [facility] cardiologist [facility]

## 2023-09-20 NOTE — TELEPHONE ENCOUNTER
Phone Number Called: [phone]    Call outcome: Did not leave a detailed message. Requested patient to call back.    Message: Called to discuss with patient of recent remote transmission.  Asked for patient to call back to discuss any symptoms.

## 2023-09-20 NOTE — TELEPHONE ENCOUNTER
Remote transmission, patient had NSVT episode on 8/16/2023 @ 5:30 PM, with VT rate of 240 BPM for approx 13 min.

## 2023-09-20 NOTE — CARDIAC REMOTE MONITOR - SCAN
Device transmission reviewed. Device demonstrated appropriate function. 13 seconds non-sustained VT noted. Pt to be instructed to seek expedited evaluation by his cardiologist.      Electronically Signed by: Mohsen Fulton M.D.    9/20/2023  2:43 PM

## 2023-09-21 NOTE — TELEPHONE ENCOUNTER
"Called, left  discussed  recommendation\" Recommend urgent followup with his cardiologist or visit to [facility]\"  Asked patient to call back to discuss.   [employer] message sent to patient as well.   "

## 2023-09-25 ENCOUNTER — HOSPITAL ENCOUNTER (OUTPATIENT)
Dept: LAB | Facility: MEDICAL CENTER | Age: 88
End: 2023-09-25
Attending: UROLOGY
Payer: MEDICARE

## 2023-09-25 ENCOUNTER — HOSPITAL ENCOUNTER (OUTPATIENT)
Facility: MEDICAL CENTER | Age: 88
End: 2023-09-25
Attending: UROLOGY
Payer: MEDICARE

## 2023-09-25 LAB — PSA SERPL-MCNC: 0.19 NG/ML (ref 0–4)

## 2023-09-25 PROCEDURE — 84153 ASSAY OF PSA TOTAL: CPT

## 2023-09-25 PROCEDURE — 80053 COMPREHEN METABOLIC PANEL: CPT

## 2023-09-25 PROCEDURE — 84403 ASSAY OF TOTAL TESTOSTERONE: CPT

## 2023-09-25 PROCEDURE — 36415 COLL VENOUS BLD VENIPUNCTURE: CPT

## 2023-09-26 LAB — TESTOST SERPL-MCNC: <3 NG/DL (ref 175–781)

## 2023-09-28 LAB
ALBUMIN SERPL BCP-MCNC: 4.5 G/DL (ref 3.2–4.9)
ALBUMIN/GLOB SERPL: 2 G/DL
ALP SERPL-CCNC: 85 U/L (ref 30–99)
ALT SERPL-CCNC: 13 U/L (ref 2–50)
ANION GAP SERPL CALC-SCNC: 17 MMOL/L (ref 7–16)
AST SERPL-CCNC: 24 U/L (ref 12–45)
BILIRUB SERPL-MCNC: 0.4 MG/DL (ref 0.1–1.5)
BUN SERPL-MCNC: 22 MG/DL (ref 8–22)
CALCIUM ALBUM COR SERPL-MCNC: 9.1 MG/DL (ref 8.5–10.5)
CALCIUM SERPL-MCNC: 9.5 MG/DL (ref 8.4–10.2)
CHLORIDE SERPL-SCNC: 107 MMOL/L (ref 96–112)
CO2 SERPL-SCNC: 19 MMOL/L (ref 20–33)
CREAT SERPL-MCNC: 0.99 MG/DL (ref 0.5–1.4)
GFR SERPLBLD CREATININE-BSD FMLA CKD-EPI: 74 ML/MIN/1.73 M 2
GLOBULIN SER CALC-MCNC: 2.3 G/DL (ref 1.9–3.5)
GLUCOSE SERPL-MCNC: 69 MG/DL (ref 65–99)
POTASSIUM SERPL-SCNC: 5.2 MMOL/L (ref 3.6–5.5)
PROT SERPL-MCNC: 6.8 G/DL (ref 6–8.2)
SODIUM SERPL-SCNC: 143 MMOL/L (ref 135–145)

## 2023-10-26 ENCOUNTER — HOSPITAL ENCOUNTER (EMERGENCY)
Facility: MEDICAL CENTER | Age: 88
End: 2023-10-26
Attending: EMERGENCY MEDICINE
Payer: MEDICARE

## 2023-10-26 ENCOUNTER — APPOINTMENT (OUTPATIENT)
Dept: RADIOLOGY | Facility: MEDICAL CENTER | Age: 88
End: 2023-10-26
Attending: EMERGENCY MEDICINE
Payer: MEDICARE

## 2023-10-26 VITALS
BODY MASS INDEX: 25.73 KG/M2 | WEIGHT: 169.75 LBS | RESPIRATION RATE: 18 BRPM | HEART RATE: 68 BPM | DIASTOLIC BLOOD PRESSURE: 79 MMHG | HEIGHT: 68 IN | SYSTOLIC BLOOD PRESSURE: 156 MMHG | TEMPERATURE: 98 F | OXYGEN SATURATION: 98 %

## 2023-10-26 DIAGNOSIS — K45.8 RECURRENT ABDOMINAL HERNIA WITHOUT OBSTRUCTION OR GANGRENE, UNSPECIFIED HERNIA TYPE: ICD-10-CM

## 2023-10-26 LAB
ALBUMIN SERPL BCP-MCNC: 4.2 G/DL (ref 3.2–4.9)
ALBUMIN/GLOB SERPL: 1.8 G/DL
ALP SERPL-CCNC: 83 U/L (ref 30–99)
ALT SERPL-CCNC: 13 U/L (ref 2–50)
ANION GAP SERPL CALC-SCNC: 12 MMOL/L (ref 7–16)
APPEARANCE UR: CLEAR
AST SERPL-CCNC: 21 U/L (ref 12–45)
BASOPHILS # BLD AUTO: 0.5 % (ref 0–1.8)
BASOPHILS # BLD: 0.04 K/UL (ref 0–0.12)
BILIRUB SERPL-MCNC: 0.5 MG/DL (ref 0.1–1.5)
BILIRUB UR QL STRIP.AUTO: NEGATIVE
BUN SERPL-MCNC: 25 MG/DL (ref 8–22)
CALCIUM ALBUM COR SERPL-MCNC: 9 MG/DL (ref 8.5–10.5)
CALCIUM SERPL-MCNC: 9.2 MG/DL (ref 8.4–10.2)
CHLORIDE SERPL-SCNC: 104 MMOL/L (ref 96–112)
CO2 SERPL-SCNC: 24 MMOL/L (ref 20–33)
COLOR UR: YELLOW
CREAT SERPL-MCNC: 0.96 MG/DL (ref 0.5–1.4)
EOSINOPHIL # BLD AUTO: 0.12 K/UL (ref 0–0.51)
EOSINOPHIL NFR BLD: 1.6 % (ref 0–6.9)
ERYTHROCYTE [DISTWIDTH] IN BLOOD BY AUTOMATED COUNT: 46.6 FL (ref 35.9–50)
GFR SERPLBLD CREATININE-BSD FMLA CKD-EPI: 76 ML/MIN/1.73 M 2
GLOBULIN SER CALC-MCNC: 2.4 G/DL (ref 1.9–3.5)
GLUCOSE SERPL-MCNC: 99 MG/DL (ref 65–99)
GLUCOSE UR STRIP.AUTO-MCNC: NEGATIVE MG/DL
HCT VFR BLD AUTO: 35.8 % (ref 42–52)
HGB BLD-MCNC: 12.1 G/DL (ref 14–18)
IMM GRANULOCYTES # BLD AUTO: 0.02 K/UL (ref 0–0.11)
IMM GRANULOCYTES NFR BLD AUTO: 0.3 % (ref 0–0.9)
KETONES UR STRIP.AUTO-MCNC: NEGATIVE MG/DL
LEUKOCYTE ESTERASE UR QL STRIP.AUTO: NEGATIVE
LYMPHOCYTES # BLD AUTO: 1.5 K/UL (ref 1–4.8)
LYMPHOCYTES NFR BLD: 19.8 % (ref 22–41)
MCH RBC QN AUTO: 30.9 PG (ref 27–33)
MCHC RBC AUTO-ENTMCNC: 33.8 G/DL (ref 32.3–36.5)
MCV RBC AUTO: 91.6 FL (ref 81.4–97.8)
MICRO URNS: NORMAL
MONOCYTES # BLD AUTO: 0.63 K/UL (ref 0–0.85)
MONOCYTES NFR BLD AUTO: 8.3 % (ref 0–13.4)
NEUTROPHILS # BLD AUTO: 5.27 K/UL (ref 1.82–7.42)
NEUTROPHILS NFR BLD: 69.5 % (ref 44–72)
NITRITE UR QL STRIP.AUTO: NEGATIVE
NRBC # BLD AUTO: 0 K/UL
NRBC BLD-RTO: 0 /100 WBC (ref 0–0.2)
PH UR STRIP.AUTO: 5.5 [PH] (ref 5–8)
PLATELET # BLD AUTO: 247 K/UL (ref 164–446)
PMV BLD AUTO: 11.5 FL (ref 9–12.9)
POTASSIUM SERPL-SCNC: 4.2 MMOL/L (ref 3.6–5.5)
PROT SERPL-MCNC: 6.6 G/DL (ref 6–8.2)
PROT UR QL STRIP: NEGATIVE MG/DL
RBC # BLD AUTO: 3.91 M/UL (ref 4.7–6.1)
RBC UR QL AUTO: NEGATIVE
SODIUM SERPL-SCNC: 140 MMOL/L (ref 135–145)
SP GR UR STRIP.AUTO: >=1.03
WBC # BLD AUTO: 7.6 K/UL (ref 4.8–10.8)

## 2023-10-26 PROCEDURE — 74177 CT ABD & PELVIS W/CONTRAST: CPT

## 2023-10-26 PROCEDURE — 36415 COLL VENOUS BLD VENIPUNCTURE: CPT

## 2023-10-26 PROCEDURE — 85025 COMPLETE CBC W/AUTO DIFF WBC: CPT

## 2023-10-26 PROCEDURE — 80053 COMPREHEN METABOLIC PANEL: CPT

## 2023-10-26 PROCEDURE — 99284 EMERGENCY DEPT VISIT MOD MDM: CPT

## 2023-10-26 PROCEDURE — 81003 URINALYSIS AUTO W/O SCOPE: CPT

## 2023-10-26 PROCEDURE — 700117 HCHG RX CONTRAST REV CODE 255: Performed by: EMERGENCY MEDICINE

## 2023-10-26 RX ADMIN — IOHEXOL 100 ML: 350 INJECTION, SOLUTION INTRAVENOUS at 21:10

## 2023-10-26 ASSESSMENT — FIBROSIS 4 INDEX: FIB4 SCORE: 1.78

## 2023-10-27 NOTE — ED PROVIDER NOTES
"ED Provider Note    CHIEF COMPLAINT  Chief Complaint   Patient presents with    Abdominal Pain    Constipation       EXTERNAL RECORDS REVIEWED  Outpatient Notes   [facility] urgent care, [facility]    HPI/ROS  LIMITATION TO HISTORY   Select: : None  OUTSIDE HISTORIAN(S):  Family patient's family is here, and states that he is concerned because of the patient's previous hernia issues.    David Darling is a 88 y.o. male who presents here for evaluation of abdominal bulging.  Patient states that he has history of hernia repair, and feels as though \"it has come back.\"  Patient has a bulge around his bellybutton, when he strains or lifts things.  He is able to lay down and have it \"go back in.\"  He has no vomiting, fever chills, chest pain, or shortness of breath.  He has no actual abdominal pain, but rather concern for recurrence of her hernia.    PAST MEDICAL HISTORY   has a past medical history of Allergy, Anemia, Arrhythmia, Bronchitis (2019), Cancer ([facility]) (2010), COPD (chronic obstructive pulmonary disease) ([facility]), Macular degeneration, Muscle disorder, Psychiatric problem, Sinusitis, chronic, and Strep throat.    SURGICAL HISTORY   has a past surgical history that includes knee arthroscopy and exploratory of abdomen (6/10/2023).    FAMILY HISTORY  Family History   Problem Relation Age of Onset    Cancer Mother     Heart Disease Father     Cancer Father     Stroke Sister        SOCIAL HISTORY  Social History     Tobacco Use    Smoking status: Never    Smokeless tobacco: Never   Vaping Use    Vaping Use: Never used   Substance and Sexual Activity    Alcohol use: No    Drug use: Yes     Types: Oral     Comment: marijuan tincture  for sleep nightly    Sexual activity: Never       CURRENT MEDICATIONS  Home Medications    **Home medications have not yet been reviewed for this encounter**         ALLERGIES  Allergies   Allergen Reactions    Azithromycin     Augmentin [Amoxicillin-Pot Clavulanate] Vomiting " "      PHYSICAL EXAM  VITAL SIGNS: BP (!) 153/89   Pulse 82   Temp 36.7 °C (98 °F) (Temporal)   Resp 18   Ht 1.727 m (5' 8\")   Wt 77 kg (169 lb 12.1 oz)   SpO2 98%   BMI 25.81 kg/m²    Constitutional: Well developed, well nourished. No acute distress.  HEENT: Normocephalic, atraumatic. Posterior pharynx clear and moist.  Eyes:  EOMI. Normal sclera.  Neck: Supple, Full range of motion, nontender.  Chest/Pulmonary: clear to ausculation. Symmetrical expansion.   Cardio: Regular rate and rhythm with no murmur.   Abdomen: Soft, periumbilical hernia noted, easily reduces. No peritoneal signs. No guarding. No palpable masses.  Musculoskeletal: No deformity, no edema, neurovascular intact.   Neuro: Clear speech, appropriate, cooperative, cranial nerves II-XII grossly intact.  Psych: Normal mood and affect      DIAGNOSTIC STUDIES / PROCEDURES  Results for orders placed or performed during the hospital encounter of 10/26/23   CBC WITH DIFFERENTIAL   Result Value Ref Range    WBC 7.6 4.8 - 10.8 K/uL    RBC 3.91 (L) 4.70 - 6.10 M/uL    Hemoglobin 12.1 (L) 14.0 - 18.0 g/dL    Hematocrit 35.8 (L) 42.0 - 52.0 %    MCV 91.6 81.4 - 97.8 fL    MCH 30.9 27.0 - 33.0 pg    MCHC 33.8 32.3 - 36.5 g/dL    RDW 46.6 35.9 - 50.0 fL    Platelet Count 247 164 - 446 K/uL    MPV 11.5 9.0 - 12.9 fL    Neutrophils-Polys 69.50 44.00 - 72.00 %    Lymphocytes 19.80 (L) 22.00 - 41.00 %    Monocytes 8.30 0.00 - 13.40 %    Eosinophils 1.60 0.00 - 6.90 %    Basophils 0.50 0.00 - 1.80 %    Immature Granulocytes 0.30 0.00 - 0.90 %    Nucleated RBC 0.00 0.00 - 0.20 /100 WBC    Neutrophils (Absolute) 5.27 1.82 - 7.42 K/uL    Lymphs (Absolute) 1.50 1.00 - 4.80 K/uL    Monos (Absolute) 0.63 0.00 - 0.85 K/uL    Eos (Absolute) 0.12 0.00 - 0.51 K/uL    Baso (Absolute) 0.04 0.00 - 0.12 K/uL    Immature Granulocytes (abs) 0.02 0.00 - 0.11 K/uL    NRBC (Absolute) 0.00 K/uL   COMP METABOLIC PANEL   Result Value Ref Range    Sodium 140 135 - 145 mmol/L    " Potassium 4.2 3.6 - 5.5 mmol/L    Chloride 104 96 - 112 mmol/L    Co2 24 20 - 33 mmol/L    Anion Gap 12.0 7.0 - 16.0    Glucose 99 65 - 99 mg/dL    Bun 25 (H) 8 - 22 mg/dL    Creatinine 0.96 0.50 - 1.40 mg/dL    Calcium 9.2 8.4 - 10.2 mg/dL    Correct Calcium 9.0 8.5 - 10.5 mg/dL    AST(SGOT) 21 12 - 45 U/L    ALT(SGPT) 13 2 - 50 U/L    Alkaline Phosphatase 83 30 - 99 U/L    Total Bilirubin 0.5 0.1 - 1.5 mg/dL    Albumin 4.2 3.2 - 4.9 g/dL    Total Protein 6.6 6.0 - 8.2 g/dL    Globulin 2.4 1.9 - 3.5 g/dL    A-G Ratio 1.8 g/dL   URINALYSIS,CULTURE IF INDICATED    Specimen: Urine, Clean Catch   Result Value Ref Range    Color Yellow     Character Clear     Specific Gravity >=1.030 <1.035    Ph 5.5 5.0 - 8.0    Glucose Negative Negative mg/dL    Ketones Negative Negative mg/dL    Protein Negative Negative mg/dL    Bilirubin Negative Negative    Nitrite Negative Negative    Leukocyte Esterase Negative Negative    Occult Blood Negative Negative    Micro Urine Req see below    ESTIMATED GFR   Result Value Ref Range    GFR (CKD-EPI) 76 >60 mL/min/1.73 m 2         RADIOLOGY  I have independently interpreted the diagnostic imaging associated with this visit and am waiting the final reading from the radiologist.   My preliminary interpretation is as follows: see below  Radiologist interpretation:   CT-ABDOMEN-PELVIS WITH   Final Result         1.  Hazy mesenteric fat stranding with small mesenteric lymph nodes, could represent changes of mesenteric adenitis or sclerosing mesenteritis in the appropriate clinical setting, otherwise nonspecific.   2.  5.4 cm umbilical hernia without visualized obstructive changes.   3.  Diverticulosis   4.  Cholelithiasis   5.  Small fat-containing bilateral inguinal hernias, left greater than right   6.  Atherosclerosis and atherosclerotic coronary artery disease            COURSE & MEDICAL DECISION MAKING    Pt will be discharged home in stable condition.       INITIAL ASSESSMENT, COURSE AND  "PLAN  Care Narrative: This is an 88-year-old male here for evaluation of a hernia.  Patient states he has no vomiting, no fever or chills, and is totally comfortable.  He states that he was worried because last time he had a hernia, he had a going for \"emergency surgery.\"  Patient states that he came in here today because he wanted to be checked.  Patient has no pain, his hernia easily reduces, and I have indicated to the patient how to do it.  He is nontoxic-appearing afebrile.  CT scan shows his hernia, and some mesenteric adenitis, but patient will follow-up outpatient, or return for any further issues or concerns.  He has an appointment with his surgeon in 3 weeks, and will follow-up.    DISPOSITION AND DISCUSSIONS  I have discussed management of the patient with the following physicians and ZACH's:  none    Discussion of management with other QHP or appropriate source(s): None     Escalation of care considered, and ultimately not performed:IV fluids.  Pt not vomiting, so no iv fluids needed.     Barriers to care at this time, including but not limited to: Patient does not have established PCP.     Decision tools and prescription drugs considered including, but not limited to:  none .    FINAL DIAGNOSIS  1. Recurrent abdominal hernia without obstruction or gangrene, unspecified hernia type           Electronically signed by: Elier De Los Santos D.O., 10/26/2023 7:29 PM      "

## 2023-10-27 NOTE — ED TRIAGE NOTES
Pt ambulated with concerns for a new hernia with increased constipation for the past 3 weeks. He states that he was seen in June for a hernia at which point he was taken to surgery. He is concerned that he may need surgery again.

## 2023-10-27 NOTE — ED NOTES
Discharged in stable condition, alert and oriented, ambulatory, accompanied by son.follow up appointment instructed. Health education imparted. Instructed to come back once symptoms worsened. Pt verbalized understanding of the information given. Removed IV line and applied pressure.

## 2023-10-31 ENCOUNTER — OFFICE VISIT (OUTPATIENT)
Dept: URGENT CARE | Facility: CLINIC | Age: 88
End: 2023-10-31
Payer: MEDICARE

## 2023-10-31 VITALS
HEART RATE: 76 BPM | OXYGEN SATURATION: 96 % | WEIGHT: 160 LBS | TEMPERATURE: 98.5 F | SYSTOLIC BLOOD PRESSURE: 136 MMHG | RESPIRATION RATE: 16 BRPM | HEIGHT: 69 IN | DIASTOLIC BLOOD PRESSURE: 66 MMHG | BODY MASS INDEX: 23.7 KG/M2

## 2023-10-31 DIAGNOSIS — J30.2 SEASONAL ALLERGIES: ICD-10-CM

## 2023-10-31 DIAGNOSIS — R00.1 SINUS BRADYCARDIA: ICD-10-CM

## 2023-10-31 DIAGNOSIS — I45.10 RBBB: ICD-10-CM

## 2023-10-31 DIAGNOSIS — B96.89 BACTERIAL SINUSITIS: ICD-10-CM

## 2023-10-31 DIAGNOSIS — Z95.0 CARDIAC PACEMAKER IN SITU: ICD-10-CM

## 2023-10-31 DIAGNOSIS — J34.89 NASAL VESTIBULITIS: ICD-10-CM

## 2023-10-31 DIAGNOSIS — J32.9 BACTERIAL SINUSITIS: ICD-10-CM

## 2023-10-31 PROCEDURE — 99214 OFFICE O/P EST MOD 30 MIN: CPT | Performed by: FAMILY MEDICINE

## 2023-10-31 PROCEDURE — 3075F SYST BP GE 130 - 139MM HG: CPT | Performed by: FAMILY MEDICINE

## 2023-10-31 PROCEDURE — 3078F DIAST BP <80 MM HG: CPT | Performed by: FAMILY MEDICINE

## 2023-10-31 RX ORDER — FLUTICASONE PROPIONATE 50 MCG
1 SPRAY, SUSPENSION (ML) NASAL 2 TIMES DAILY
COMMUNITY

## 2023-10-31 RX ORDER — ALPRAZOLAM 1 MG/1
1 TABLET ORAL 3 TIMES DAILY PRN
COMMUNITY

## 2023-10-31 RX ORDER — CEPHALEXIN 500 MG/1
1000 CAPSULE ORAL 2 TIMES DAILY
Qty: 20 CAPSULE | Refills: 0 | Status: SHIPPED | OUTPATIENT
Start: 2023-10-31 | End: 2023-11-05

## 2023-10-31 ASSESSMENT — FIBROSIS 4 INDEX: FIB4 SCORE: 2.05

## 2023-10-31 ASSESSMENT — ENCOUNTER SYMPTOMS: SINUS PAIN: 1

## 2023-11-01 NOTE — PROGRESS NOTES
Subjective     David Darling is a 88 y.o. male who presents with Sinus Problem ([id number], Burning nasal passages, nasal congestion, concern for a sinus infection, fatigue, pain about eyebrow area,)      - This is a very pleasant 88 y.o. who has come to the walk-in clinic today for 3 weeks sinus pain/pressure and nasal dc, past day tip nose red tender. No nvfc but has felt some fatigue.     Hx nasal allergies.     Hx RBBB, bradycardia. Has pacemaker.       ALLERGIES:  Azithromycin and Augmentin [amoxicillin-pot clavulanate]     PMH:  Past Medical History:   Diagnosis Date    Allergy     Anemia     Arrhythmia     Atrial Fib    Bronchitis 2019    Cancer ([facility]) 2010    Prostate    COPD (chronic obstructive pulmonary disease) ([facility])     Macular degeneration     Muscle disorder     Psychiatric problem     depression and anxiety    Sinusitis, chronic     Strep throat         PSH:  Past Surgical History:   Procedure Laterality Date    VT EXPLORATORY OF ABDOMEN  6/10/2023    Procedure: LAPAROTOMY, EXPLORATORY - INCARCERATED HERNIA, BOWEL RESECTION;  Surgeon: Darius Sánchez M.D.;  Location: SURGERY [facility];  Service: General    KNEE ARTHROSCOPY         MEDS:    Current Outpatient Medications:     fluticasone (FLONASE) 50 MCG/ACT nasal spray, ADMINISTER 1 SPRAY INTO AFFECTED NOSTRIL(S) 2 TIMES A DAY FOR 7 DAYS., Disp: , Rfl:     diclofenac sodium (VOLTAREN) 1 % Gel, , Disp: , Rfl:     ALPRAZolam (XANAX) 1 MG Tab, PLEASE SEE ATTACHED FOR DETAILED DIRECTIONS, Disp: , Rfl:     cephALEXin (KEFLEX) 500 MG Cap, Take 2 Capsules by mouth 2 times a day for 5 days., Disp: 20 Capsule, Rfl: 0    albuterol 108 (90 Base) MCG/ACT Aero Soln inhalation aerosol, Inhale 2 Puffs every 6 hours as needed for Shortness of Breath. Indications: Spasm of Lung Air Passages, Disp: 8.5 g, Rfl: 0    levothyroxine (SYNTHROID) 88 MCG Tab, Take 1 Tablet by mouth every morning on an empty stomach. Indications: Underactive Thyroid, Disp: 90 Tablet,  "Rfl: 3    tamsulosin (FLOMAX) 0.4 MG capsule, Take 1 Capsule by mouth at bedtime. Indications: Chronic Prostate Gland Inflammation, Disp: 90 Capsule, Rfl: 3    escitalopram (LEXAPRO) 10 MG Tab, Take 1 Tablet by mouth every day. Indications: Major Depressive Disorder, Disp: 90 Tablet, Rfl: 3    polyethylene glycol 3350 (MIRALAX) 17 GM/SCOOP Powder, Take 17 g by mouth 1 time a day as needed (constipation)., Disp: , Rfl:     Cholecalciferol (VITAMIN D3) 25 MCG (1000 UT) Cap, Take 1 Capsule by mouth every day at 6 PM. Indications: Suppliments, Disp: , Rfl:     multivitamin Tab, Take 1 Tablet by mouth every evening. Indications: Nutritional Support, Disp: , Rfl:     Multiple Vitamins-Minerals (PRESERVISION AREDS 2) Cap, Take 1 Capsule by mouth 2 times a day. Indications: suppliment, Disp: , Rfl:     Relugolix (ORGOVYX) 120 MG Tab, Take 120 mg by mouth every day. Indications: Cancer of the Prostate Gland, Disp: , Rfl:     ** I have documented what I find to be significant in regards to past medical, social, family and surgical history  in my HPI or under PMH/PSH/FH review section, otherwise it is noncontributory **         HPI    Review of Systems   HENT:  Positive for congestion and sinus pain.    All other systems reviewed and are negative.             Objective     /66   Pulse 76   Temp 36.9 °C (98.5 °F) (Temporal)   Resp 16   Ht 1.753 m (5' 9\")   Wt 72.6 kg (160 lb)   SpO2 96%   BMI 23.63 kg/m²      Physical Exam  Vitals and nursing note reviewed.   Constitutional:       General: He is not in acute distress.     Appearance: Normal appearance. He is well-developed.   HENT:      Head: Normocephalic.      Nose: Congestion and rhinorrhea present.      Comments: Tip nose and Lt nares w/ some erythema edema and ttp      Mouth/Throat:      Mouth: Mucous membranes are moist.      Pharynx: Oropharynx is clear. No oropharyngeal exudate or posterior oropharyngeal erythema.   Cardiovascular:      Heart sounds: Normal " heart sounds. No murmur heard.  Pulmonary:      Effort: Pulmonary effort is normal. No respiratory distress.      Breath sounds: No wheezing, rhonchi or rales.   Neurological:      Mental Status: He is alert.      Motor: No abnormal muscle tone.   Psychiatric:         Mood and Affect: Mood normal.         Behavior: Behavior normal.                             Assessment & Plan     1. Bacterial sinusitis  cephALEXin (KEFLEX) 500 MG Cap      2. Nasal vestibulitis  cephALEXin (KEFLEX) 500 MG Cap      3. RBBB        4. Sinus bradycardia        5. Cardiac pacemaker in situ        6. Seasonal allergies            - Dx, plan & d/c instructions discussed   - warm compresses  - OTC Nasal spray AYR      Follow up with your regular primary care providers office in a week for a recheck on today's visit. ER if not improving in 2-3 days or if feeling/getting worse.     Patient left in stable condition     Pertinent prior lab work and/or imaging studies in Epic have been reviewed by me today on day of this visit and taken into account for my treatment and plan today    Pertinent PMH/PSH and/or chronic conditions and medications if any were reviewed today and taken into account for my treatment and plan today    Pertinent prior office visit notes in [facility] have been reviewed by me today on day of this visit.    Please note that this dictation may have been created using voice recognition software, if so I have made every reasonable attempt to correct obvious errors, but I expect that there are errors of grammar and possibly content that I did not discover before finalizing the note.

## 2023-12-18 DIAGNOSIS — J98.01 BRONCHOSPASM: ICD-10-CM

## 2023-12-18 RX ORDER — ALBUTEROL SULFATE 90 UG/1
2 AEROSOL, METERED RESPIRATORY (INHALATION) EVERY 6 HOURS PRN
Qty: 8.5 EACH | Refills: 3 | Status: SHIPPED | OUTPATIENT
Start: 2023-12-18

## 2023-12-20 ENCOUNTER — TELEPHONE (OUTPATIENT)
Dept: CARDIOLOGY | Facility: MEDICAL CENTER | Age: 88
End: 2023-12-20
Payer: MEDICARE

## 2023-12-20 ENCOUNTER — NON-PROVIDER VISIT (OUTPATIENT)
Dept: CARDIOLOGY | Facility: MEDICAL CENTER | Age: 88
End: 2023-12-20
Payer: MEDICARE

## 2023-12-20 PROCEDURE — 93294 REM INTERROG EVL PM/LDLS PM: CPT | Performed by: INTERNAL MEDICINE

## 2023-12-20 NOTE — TELEPHONE ENCOUNTER
FYI - remote transmission received via home monitor, full report scanned into Media.    NSVT Episode  -Onset: 11/24/2023 @ 6:37 PM  -Duration: 3 seconds  -Average V Rate: 162 bpm    NSVT Episode  -Onset: 11/25/2023 @ 8:56 AM  -Duration: 3 seconds  -Average V Rate: 168 bpm

## 2023-12-20 NOTE — CARDIAC REMOTE MONITOR - SCAN
Device transmission reviewed. Device demonstrated appropriate function. NSVT noted.      Electronically Signed by: Mohsen Fulton M.D.    12/20/2023  11:03 AM

## 2023-12-20 NOTE — TELEPHONE ENCOUNTER
Called patient, left VM to discuss remote transmission. Asked patient to call back to discuss and to schedule a f/u appt.

## 2023-12-26 ENCOUNTER — TELEPHONE (OUTPATIENT)
Dept: CARDIOLOGY | Facility: MEDICAL CENTER | Age: 88
End: 2023-12-26

## 2023-12-26 NOTE — TELEPHONE ENCOUNTER
Caller: David Darling      Topic/issue: Patient was returning a call he received from Latrice about his monitor and was asking for a call back      Callback Number: [phone]      Thank you    -Douglas BUI

## 2023-12-28 NOTE — TELEPHONE ENCOUNTER
Torrie,    I just got a voicemail from this patient requesting a call back from you, returning this call. Can you please call him to discuss?    Thank You,  Ivelisse

## 2023-12-28 NOTE — TELEPHONE ENCOUNTER
Phone Number Called: [phone]    Call outcome: Spoke to patient regarding message below.    Message: Called to discuss patients remote transmission.   Patient reports he may have been doing some exercise.   Feeling good, no concerns.   Patient not seen in clinic since 2021, recommended a follow up appt as well.   Patient verbalized understanding.

## 2023-12-28 NOTE — TELEPHONE ENCOUNTER
Called, left VM with patient to call back with any further questions or concerns regarding remote transmission on the 20th.

## 2024-01-08 ENCOUNTER — APPOINTMENT (OUTPATIENT)
Dept: ADMISSIONS | Facility: MEDICAL CENTER | Age: 89
End: 2024-01-08
Attending: SURGERY
Payer: MEDICARE

## 2024-01-15 ENCOUNTER — ANESTHESIA (OUTPATIENT)
Dept: SURGERY | Facility: MEDICAL CENTER | Age: 89
End: 2024-01-15
Payer: MEDICARE

## 2024-01-15 ENCOUNTER — HOSPITAL ENCOUNTER (OUTPATIENT)
Facility: MEDICAL CENTER | Age: 89
End: 2024-01-16
Attending: SURGERY | Admitting: SURGERY
Payer: MEDICARE

## 2024-01-15 ENCOUNTER — ANESTHESIA EVENT (OUTPATIENT)
Dept: SURGERY | Facility: MEDICAL CENTER | Age: 89
End: 2024-01-15
Payer: MEDICARE

## 2024-01-15 DIAGNOSIS — G89.18 POST-OPERATIVE PAIN: ICD-10-CM

## 2024-01-15 LAB
EKG IMPRESSION: NORMAL
GLUCOSE BLD STRIP.AUTO-MCNC: 109 MG/DL (ref 65–99)

## 2024-01-15 PROCEDURE — 700101 HCHG RX REV CODE 250: Performed by: ANESTHESIOLOGY

## 2024-01-15 PROCEDURE — 700111 HCHG RX REV CODE 636 W/ 250 OVERRIDE (IP): Performed by: ANESTHESIOLOGY

## 2024-01-15 PROCEDURE — 160002 HCHG RECOVERY MINUTES (STAT): Performed by: SURGERY

## 2024-01-15 PROCEDURE — 160031 HCHG SURGERY MINUTES - 1ST 30 MINS LEVEL 5: Performed by: SURGERY

## 2024-01-15 PROCEDURE — 700111 HCHG RX REV CODE 636 W/ 250 OVERRIDE (IP): Mod: JZ | Performed by: ANESTHESIOLOGY

## 2024-01-15 PROCEDURE — 160036 HCHG PACU - EA ADDL 30 MINS PHASE I: Performed by: SURGERY

## 2024-01-15 PROCEDURE — A9270 NON-COVERED ITEM OR SERVICE: HCPCS | Performed by: SURGERY

## 2024-01-15 PROCEDURE — 700105 HCHG RX REV CODE 258: Performed by: ANESTHESIOLOGY

## 2024-01-15 PROCEDURE — 160009 HCHG ANES TIME/MIN: Performed by: SURGERY

## 2024-01-15 PROCEDURE — 700102 HCHG RX REV CODE 250 W/ 637 OVERRIDE(OP): Performed by: ANESTHESIOLOGY

## 2024-01-15 PROCEDURE — 160048 HCHG OR STATISTICAL LEVEL 1-5: Performed by: SURGERY

## 2024-01-15 PROCEDURE — 93010 ELECTROCARDIOGRAM REPORT: CPT | Performed by: INTERNAL MEDICINE

## 2024-01-15 PROCEDURE — A9270 NON-COVERED ITEM OR SERVICE: HCPCS | Performed by: ANESTHESIOLOGY

## 2024-01-15 PROCEDURE — C1713 ANCHOR/SCREW BN/BN,TIS/BN: HCPCS | Performed by: SURGERY

## 2024-01-15 PROCEDURE — 502714 HCHG ROBOTIC SURGERY SERVICES: Performed by: SURGERY

## 2024-01-15 PROCEDURE — 700111 HCHG RX REV CODE 636 W/ 250 OVERRIDE (IP): Mod: JZ

## 2024-01-15 PROCEDURE — 93005 ELECTROCARDIOGRAM TRACING: CPT | Performed by: SURGERY

## 2024-01-15 PROCEDURE — 160035 HCHG PACU - 1ST 60 MINS PHASE I: Performed by: SURGERY

## 2024-01-15 PROCEDURE — 82962 GLUCOSE BLOOD TEST: CPT

## 2024-01-15 PROCEDURE — 160047 HCHG PACU  - EA ADDL 30 MINS PHASE II: Performed by: SURGERY

## 2024-01-15 PROCEDURE — 160042 HCHG SURGERY MINUTES - EA ADDL 1 MIN LEVEL 5: Performed by: SURGERY

## 2024-01-15 PROCEDURE — 160025 RECOVERY II MINUTES (STATS): Performed by: SURGERY

## 2024-01-15 PROCEDURE — 700105 HCHG RX REV CODE 258: Performed by: SURGERY

## 2024-01-15 PROCEDURE — 700102 HCHG RX REV CODE 250 W/ 637 OVERRIDE(OP): Performed by: SURGERY

## 2024-01-15 PROCEDURE — C1781 MESH (IMPLANTABLE): HCPCS | Performed by: SURGERY

## 2024-01-15 PROCEDURE — 700101 HCHG RX REV CODE 250: Performed by: SURGERY

## 2024-01-15 PROCEDURE — G0378 HOSPITAL OBSERVATION PER HR: HCPCS

## 2024-01-15 PROCEDURE — 160046 HCHG PACU - 1ST 60 MINS PHASE II: Performed by: SURGERY

## 2024-01-15 PROCEDURE — 700111 HCHG RX REV CODE 636 W/ 250 OVERRIDE (IP): Mod: JZ | Performed by: SURGERY

## 2024-01-15 DEVICE — MESH LAP PROGRIP FLAT SHEET 16 X 12: Type: IMPLANTABLE DEVICE | Status: FUNCTIONAL

## 2024-01-15 RX ORDER — ESCITALOPRAM OXALATE 10 MG/1
10 TABLET ORAL DAILY
Status: DISCONTINUED | OUTPATIENT
Start: 2024-01-16 | End: 2024-01-16 | Stop reason: HOSPADM

## 2024-01-15 RX ORDER — DEXAMETHASONE SODIUM PHOSPHATE 4 MG/ML
INJECTION, SOLUTION INTRA-ARTICULAR; INTRALESIONAL; INTRAMUSCULAR; INTRAVENOUS; SOFT TISSUE PRN
Status: DISCONTINUED | OUTPATIENT
Start: 2024-01-15 | End: 2024-01-15 | Stop reason: SURG

## 2024-01-15 RX ORDER — OXYCODONE HYDROCHLORIDE AND ACETAMINOPHEN 5; 325 MG/1; MG/1
1 TABLET ORAL EVERY 4 HOURS PRN
Qty: 20 TABLET | Refills: 0 | Status: SHIPPED | OUTPATIENT
Start: 2024-01-15 | End: 2024-01-20

## 2024-01-15 RX ORDER — LIDOCAINE HYDROCHLORIDE 40 MG/ML
SOLUTION TOPICAL PRN
Status: DISCONTINUED | OUTPATIENT
Start: 2024-01-15 | End: 2024-01-15 | Stop reason: SURG

## 2024-01-15 RX ORDER — ONDANSETRON 2 MG/ML
4 INJECTION INTRAMUSCULAR; INTRAVENOUS
Status: COMPLETED | OUTPATIENT
Start: 2024-01-15 | End: 2024-01-15

## 2024-01-15 RX ORDER — ROCURONIUM BROMIDE 10 MG/ML
INJECTION, SOLUTION INTRAVENOUS PRN
Status: DISCONTINUED | OUTPATIENT
Start: 2024-01-15 | End: 2024-01-15 | Stop reason: SURG

## 2024-01-15 RX ORDER — SODIUM CHLORIDE, SODIUM LACTATE, POTASSIUM CHLORIDE, CALCIUM CHLORIDE 600; 310; 30; 20 MG/100ML; MG/100ML; MG/100ML; MG/100ML
INJECTION, SOLUTION INTRAVENOUS CONTINUOUS
Status: ACTIVE | OUTPATIENT
Start: 2024-01-15 | End: 2024-01-15

## 2024-01-15 RX ORDER — HYDRALAZINE HYDROCHLORIDE 20 MG/ML
5 INJECTION INTRAMUSCULAR; INTRAVENOUS
Status: DISCONTINUED | OUTPATIENT
Start: 2024-01-15 | End: 2024-01-15 | Stop reason: HOSPADM

## 2024-01-15 RX ORDER — HYDROMORPHONE HYDROCHLORIDE 1 MG/ML
0.2 INJECTION, SOLUTION INTRAMUSCULAR; INTRAVENOUS; SUBCUTANEOUS
Status: DISCONTINUED | OUTPATIENT
Start: 2024-01-15 | End: 2024-01-15 | Stop reason: HOSPADM

## 2024-01-15 RX ORDER — OXYCODONE HCL 5 MG/5 ML
5 SOLUTION, ORAL ORAL
Status: COMPLETED | OUTPATIENT
Start: 2024-01-15 | End: 2024-01-15

## 2024-01-15 RX ORDER — HALOPERIDOL 5 MG/ML
1 INJECTION INTRAMUSCULAR
Status: DISCONTINUED | OUTPATIENT
Start: 2024-01-15 | End: 2024-01-15 | Stop reason: HOSPADM

## 2024-01-15 RX ORDER — LIDOCAINE HYDROCHLORIDE 20 MG/ML
INJECTION, SOLUTION EPIDURAL; INFILTRATION; INTRACAUDAL; PERINEURAL PRN
Status: DISCONTINUED | OUTPATIENT
Start: 2024-01-15 | End: 2024-01-15 | Stop reason: SURG

## 2024-01-15 RX ORDER — ONDANSETRON 2 MG/ML
INJECTION INTRAMUSCULAR; INTRAVENOUS PRN
Status: DISCONTINUED | OUTPATIENT
Start: 2024-01-15 | End: 2024-01-15 | Stop reason: SURG

## 2024-01-15 RX ORDER — HYDROMORPHONE HYDROCHLORIDE 1 MG/ML
0.1 INJECTION, SOLUTION INTRAMUSCULAR; INTRAVENOUS; SUBCUTANEOUS
Status: DISCONTINUED | OUTPATIENT
Start: 2024-01-15 | End: 2024-01-15 | Stop reason: HOSPADM

## 2024-01-15 RX ORDER — TAMSULOSIN HYDROCHLORIDE 0.4 MG/1
0.4 CAPSULE ORAL
Status: DISCONTINUED | OUTPATIENT
Start: 2024-01-15 | End: 2024-01-16 | Stop reason: HOSPADM

## 2024-01-15 RX ORDER — METOPROLOL TARTRATE 1 MG/ML
1 INJECTION, SOLUTION INTRAVENOUS
Status: DISCONTINUED | OUTPATIENT
Start: 2024-01-15 | End: 2024-01-15 | Stop reason: HOSPADM

## 2024-01-15 RX ORDER — OXYCODONE HYDROCHLORIDE AND ACETAMINOPHEN 5; 325 MG/1; MG/1
1 TABLET ORAL EVERY 4 HOURS PRN
Status: DISCONTINUED | OUTPATIENT
Start: 2024-01-15 | End: 2024-01-16 | Stop reason: HOSPADM

## 2024-01-15 RX ORDER — IPRATROPIUM BROMIDE AND ALBUTEROL SULFATE 2.5; .5 MG/3ML; MG/3ML
3 SOLUTION RESPIRATORY (INHALATION)
Status: DISCONTINUED | OUTPATIENT
Start: 2024-01-15 | End: 2024-01-15 | Stop reason: HOSPADM

## 2024-01-15 RX ORDER — CEFAZOLIN SODIUM 1 G/3ML
INJECTION, POWDER, FOR SOLUTION INTRAMUSCULAR; INTRAVENOUS PRN
Status: DISCONTINUED | OUTPATIENT
Start: 2024-01-15 | End: 2024-01-15 | Stop reason: SURG

## 2024-01-15 RX ORDER — ALPRAZOLAM 1 MG/1
1 TABLET ORAL 3 TIMES DAILY PRN
Status: DISCONTINUED | OUTPATIENT
Start: 2024-01-15 | End: 2024-01-16 | Stop reason: HOSPADM

## 2024-01-15 RX ORDER — HYDROMORPHONE HYDROCHLORIDE 2 MG/ML
INJECTION, SOLUTION INTRAMUSCULAR; INTRAVENOUS; SUBCUTANEOUS PRN
Status: DISCONTINUED | OUTPATIENT
Start: 2024-01-15 | End: 2024-01-15 | Stop reason: SURG

## 2024-01-15 RX ORDER — HALOPERIDOL 5 MG/ML
INJECTION INTRAMUSCULAR
Status: COMPLETED
Start: 2024-01-15 | End: 2024-01-15

## 2024-01-15 RX ORDER — DIPHENHYDRAMINE HYDROCHLORIDE 50 MG/ML
12.5 INJECTION INTRAMUSCULAR; INTRAVENOUS
Status: DISCONTINUED | OUTPATIENT
Start: 2024-01-15 | End: 2024-01-15 | Stop reason: HOSPADM

## 2024-01-15 RX ORDER — HYDROMORPHONE HYDROCHLORIDE 1 MG/ML
0.4 INJECTION, SOLUTION INTRAMUSCULAR; INTRAVENOUS; SUBCUTANEOUS
Status: DISCONTINUED | OUTPATIENT
Start: 2024-01-15 | End: 2024-01-15 | Stop reason: HOSPADM

## 2024-01-15 RX ORDER — OXYCODONE HCL 5 MG/5 ML
10 SOLUTION, ORAL ORAL
Status: COMPLETED | OUTPATIENT
Start: 2024-01-15 | End: 2024-01-15

## 2024-01-15 RX ORDER — BUPIVACAINE HYDROCHLORIDE AND EPINEPHRINE 5; 5 MG/ML; UG/ML
INJECTION, SOLUTION EPIDURAL; INTRACAUDAL; PERINEURAL
Status: DISCONTINUED | OUTPATIENT
Start: 2024-01-15 | End: 2024-01-15 | Stop reason: HOSPADM

## 2024-01-15 RX ORDER — SODIUM CHLORIDE, SODIUM LACTATE, POTASSIUM CHLORIDE, CALCIUM CHLORIDE 600; 310; 30; 20 MG/100ML; MG/100ML; MG/100ML; MG/100ML
INJECTION, SOLUTION INTRAVENOUS CONTINUOUS
Status: DISCONTINUED | OUTPATIENT
Start: 2024-01-15 | End: 2024-01-15 | Stop reason: HOSPADM

## 2024-01-15 RX ORDER — LEVOTHYROXINE SODIUM 88 UG/1
88 TABLET ORAL
Status: DISCONTINUED | OUTPATIENT
Start: 2024-01-16 | End: 2024-01-16 | Stop reason: HOSPADM

## 2024-01-15 RX ORDER — ACETAMINOPHEN 500 MG
1000 TABLET ORAL ONCE
Status: COMPLETED | OUTPATIENT
Start: 2024-01-15 | End: 2024-01-15

## 2024-01-15 RX ADMIN — LIDOCAINE HYDROCHLORIDE 60 MG: 20 INJECTION, SOLUTION EPIDURAL; INFILTRATION; INTRACAUDAL at 15:43

## 2024-01-15 RX ADMIN — LIDOCAINE HYDROCHLORIDE 4 ML: 40 SOLUTION TOPICAL at 15:44

## 2024-01-15 RX ADMIN — NOREPINEPHRINE BITARTRATE 0.1 MCG/KG/MIN: 1 INJECTION, SOLUTION, CONCENTRATE INTRAVENOUS at 15:45

## 2024-01-15 RX ADMIN — DEXAMETHASONE SODIUM PHOSPHATE 8 MG: 4 INJECTION INTRA-ARTICULAR; INTRALESIONAL; INTRAMUSCULAR; INTRAVENOUS; SOFT TISSUE at 15:43

## 2024-01-15 RX ADMIN — ONDANSETRON 4 MG: 2 INJECTION INTRAMUSCULAR; INTRAVENOUS at 17:04

## 2024-01-15 RX ADMIN — CEFAZOLIN 2 G: 1 INJECTION, POWDER, FOR SOLUTION INTRAMUSCULAR; INTRAVENOUS at 15:43

## 2024-01-15 RX ADMIN — ACETAMINOPHEN 1000 MG: 500 TABLET ORAL at 15:30

## 2024-01-15 RX ADMIN — ROCURONIUM BROMIDE 50 MG: 50 INJECTION, SOLUTION INTRAVENOUS at 15:43

## 2024-01-15 RX ADMIN — FENTANYL CITRATE 25 MCG: 50 INJECTION, SOLUTION INTRAMUSCULAR; INTRAVENOUS at 18:26

## 2024-01-15 RX ADMIN — HALOPERIDOL 1 MG: 5 INJECTION INTRAMUSCULAR at 21:31

## 2024-01-15 RX ADMIN — HYDROMORPHONE HYDROCHLORIDE 0.5 MG: 2 INJECTION INTRAMUSCULAR; INTRAVENOUS; SUBCUTANEOUS at 15:37

## 2024-01-15 RX ADMIN — FENTANYL CITRATE 25 MCG: 50 INJECTION, SOLUTION INTRAMUSCULAR; INTRAVENOUS at 18:00

## 2024-01-15 RX ADMIN — ONDANSETRON 4 MG: 2 INJECTION INTRAMUSCULAR; INTRAVENOUS at 17:50

## 2024-01-15 RX ADMIN — TAMSULOSIN HYDROCHLORIDE 0.4 MG: 0.4 CAPSULE ORAL at 23:47

## 2024-01-15 RX ADMIN — HYDROMORPHONE HYDROCHLORIDE 0.5 MG: 2 INJECTION INTRAMUSCULAR; INTRAVENOUS; SUBCUTANEOUS at 16:17

## 2024-01-15 RX ADMIN — OXYCODONE AND ACETAMINOPHEN 1 TABLET: 5; 325 TABLET ORAL at 23:47

## 2024-01-15 RX ADMIN — PROPOFOL 50 MG: 10 INJECTION, EMULSION INTRAVENOUS at 17:04

## 2024-01-15 RX ADMIN — FENTANYL CITRATE 25 MCG: 50 INJECTION, SOLUTION INTRAMUSCULAR; INTRAVENOUS at 18:23

## 2024-01-15 RX ADMIN — HALOPERIDOL LACTATE 1 MG: 5 INJECTION, SOLUTION INTRAMUSCULAR at 21:31

## 2024-01-15 RX ADMIN — SUGAMMADEX 200 MG: 100 INJECTION, SOLUTION INTRAVENOUS at 17:04

## 2024-01-15 RX ADMIN — PROPOFOL 120 MG: 10 INJECTION, EMULSION INTRAVENOUS at 15:43

## 2024-01-15 RX ADMIN — FENTANYL CITRATE 25 MCG: 50 INJECTION, SOLUTION INTRAMUSCULAR; INTRAVENOUS at 17:50

## 2024-01-15 RX ADMIN — OXYCODONE HYDROCHLORIDE 5 MG: 5 SOLUTION ORAL at 18:03

## 2024-01-15 RX ADMIN — SODIUM CHLORIDE, POTASSIUM CHLORIDE, SODIUM LACTATE AND CALCIUM CHLORIDE: 600; 310; 30; 20 INJECTION, SOLUTION INTRAVENOUS at 13:55

## 2024-01-15 ASSESSMENT — LIFESTYLE VARIABLES
EVER HAD A DRINK FIRST THING IN THE MORNING TO STEADY YOUR NERVES TO GET RID OF A HANGOVER: NO
AVERAGE NUMBER OF DAYS PER WEEK YOU HAVE A DRINK CONTAINING ALCOHOL: 0
EVER FELT BAD OR GUILTY ABOUT YOUR DRINKING: NO
CONSUMPTION TOTAL: NEGATIVE
TOTAL SCORE: 0
HAVE YOU EVER FELT YOU SHOULD CUT DOWN ON YOUR DRINKING: NO
HOW MANY TIMES IN THE PAST YEAR HAVE YOU HAD 5 OR MORE DRINKS IN A DAY: 0
HAVE PEOPLE ANNOYED YOU BY CRITICIZING YOUR DRINKING: NO
TOTAL SCORE: 0
ALCOHOL_USE: NO
ON A TYPICAL DAY WHEN YOU DRINK ALCOHOL HOW MANY DRINKS DO YOU HAVE: 0
TOTAL SCORE: 0

## 2024-01-15 ASSESSMENT — PAIN DESCRIPTION - PAIN TYPE
TYPE: SURGICAL PAIN
TYPE: ACUTE PAIN;SURGICAL PAIN
TYPE: SURGICAL PAIN

## 2024-01-15 ASSESSMENT — FIBROSIS 4 INDEX
FIB4 SCORE: 2.05
FIB4 SCORE: 2.05

## 2024-01-15 NOTE — ANESTHESIA PROCEDURE NOTES
Airway    Date/Time: 1/15/2024 3:44 PM    Performed by: Michael Uribe M.D.  Authorized by: Michael Uribe M.D.    Location:  OR  Urgency:  Elective  Indications for Airway Management:  Anesthesia      Spontaneous Ventilation: absent    Sedation Level:  Deep  Preoxygenated: Yes    Patient Position:  Sniffing  Mask Difficulty Assessment:  0 - not attempted  Final Airway Type:  Endotracheal airway  Final Endotracheal Airway:  ETT  Cuffed: Yes    Technique Used for Successful ETT Placement:  Direct laryngoscopy    Insertion Site:  Oral  Blade Type:  Himanshu  Laryngoscope Blade/Videolaryngoscope Blade Size:  4  ETT Size (mm):  8.0  Measured from:  Teeth  ETT to Teeth (cm):  21  Placement Verified by: auscultation and capnometry    Cormack-Lehane Classification:  Grade I - full view of glottis  Number of Attempts at Approach:  1

## 2024-01-15 NOTE — H&P
General Surgery Consult (EGS)  [facility]          CHIEF COMPLAINT: Hernia.    HISTORY OF PRESENT ILLNESS: The patient is a 88 y.o. male, who presents with a recurrent hernia after exploratory laparotomy for obstruction.  Patient has had no change in his health history since I saw him last.     Referring Provider: Dr. Sánchez    BMI:Body mass index is 25.99 kg/m².     PAST MEDICAL HISTORY:  has a past medical history of Allergy, Anemia, Arrhythmia, Bronchitis (2019), Cancer (HCC) (2010), COPD (chronic obstructive pulmonary disease) ([facility]), Hypoglycemia, Macular degeneration, Muscle disorder, Psychiatric problem, Sinusitis, chronic, and Strep throat.     PAST SURGICAL HISTORY:  has a past surgical history that includes knee arthroscopy and exploratory of abdomen (6/10/2023).     ALLERGIES:   Allergies   Allergen Reactions    Azithromycin     Augmentin [Amoxicillin-Pot Clavulanate] Vomiting        CURRENT MEDICATIONS:   Home Medications       Reviewed by Allie Zaman R.N. (Registered Nurse) on 01/15/24 at 1329  Med List Status: Complete     Medication Last Dose Status   albuterol 108 (90 Base) MCG/ACT Aero Soln inhalation aerosol 1/15/2024 Active   ALPRAZolam (XANAX) 1 MG Tab 1/15/2024 Active   Cholecalciferol (VITAMIN D3) 25 MCG (1000 UT) Cap 1/1/2024 Active   diclofenac sodium (VOLTAREN) 1 % Gel 1/8/2024 Active   escitalopram (LEXAPRO) 10 MG Tab 1/15/2024 Active   fluticasone (FLONASE) 50 MCG/ACT nasal spray 1/15/2024 Active   levothyroxine (SYNTHROID) 88 MCG Tab 1/14/2024 Active   Multiple Vitamins-Minerals (PRESERVISION AREDS 2) Cap 1/15/2024 Active   multivitamin Tab 1/1/2024 Active   polyethylene glycol 3350 (MIRALAX) 17 GM/SCOOP Powder 1/14/2024 Active   Relugolix (ORGOVYX) 120 MG Tab 1/14/2024 Active   tamsulosin (FLOMAX) 0.4 MG capsule 1/14/2024 Active                    FAMILY HISTORY:   Family History   Problem Relation Age of Onset    Cancer Mother     Heart Disease Father     Cancer  "Father     Stroke Sister         SOCIAL HISTORY:   Social History     Tobacco Use    Smoking status: Never    Smokeless tobacco: Never   Vaping Use    Vaping Use: Never used   Substance and Sexual Activity    Alcohol use: No    Drug use: Yes     Types: Oral     Comment: marijuan tincture  for sleep nightly    Sexual activity: Never       REVIEW OF SYSTEMS: Comprehensive review of systems was negative aside from abdominal pain and a bulge    PHYSICAL EXAMINATION:     GENERAL: The patient is in no acute distress.   VITAL SIGNS: /86   Pulse 74   Temp 36.6 °C (97.8 °F) (Temporal)   Resp 16   Ht 1.715 m (5' 7.5\")   Wt 76.4 kg (168 lb 6.9 oz)   SpO2 96%   HEAD AND NECK: Demonstrates symmetric, reactive pupils. Extraocular muscles   are intact. Nares and oropharynx are clear.   NECK: Supple. No adenopathy.  CHEST:No respiratory distress.    CARDIOVASCULAR: Regular rate. The extremities are well perfused.   ABDOMEN: Hernia approximately 5 cm.   EXTREMITIES: Examination of the upper and lower extremities demonstrates no cyanosis edema or clubbing.  NEUROLOGIC: Alert & oriented x 3, Normal motor function, Normal sensory function, No focal deficits noted.    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  Recurrent incisional hernia.    1.  The patient will be taken to the operating room for robotic assisted repair of a recurrent incisional hernia 3 to 10 cm. The surgical conduct was explained. Potential complications including but not limited to infection, bleeding, damage to adjacent structures, anesthetic complications were discussed in detail. Questions were elicited and answered to his satisfaction. He understands the rationale for surgery and elects to proceed.  Operative consent signed.              ___________________________________   Mendoza Kemp M.D.    [facility]  [phone]    DD: 1/15/2024 DT: 2:43 PM

## 2024-01-15 NOTE — ANESTHESIA PREPROCEDURE EVALUATION
Case: [id number] Date/Time: 01/15/24 1430    Procedure: ROBOTIC-ASSISTED TRANSABDOMINAL PREPERITONEAL REPAIR OF A 5 CM RECURRENT INCISIONAL HERNIA    Pre-op diagnosis: RECURRENT VENTRAL INCISIONAL HERNIA    Location: [facility] OR 17 / SURGERY [facility]    Surgeons: Mendoza Kemp M.D.          Past Medical History:   Diagnosis Date    Allergy     Anemia     Arrhythmia     Atrial Fib    Bronchitis 2019    Cancer (HCC) 2010    Prostate    COPD (chronic obstructive pulmonary disease) (HCC)     Macular degeneration     Muscle disorder     Psychiatric problem     depression and anxiety    Sinusitis, chronic     Strep throat        Past Surgical History:   Procedure Laterality Date    MI EXPLORATORY OF ABDOMEN  6/10/2023    Procedure: LAPAROTOMY, EXPLORATORY - INCARCERATED HERNIA, BOWEL RESECTION;  Surgeon: Darius Sánchez M.D.;  Location: SURGERY [facility];  Service: General    KNEE ARTHROSCOPY         Current Outpatient Medications   Medication Instructions    albuterol 108 (90 Base) MCG/ACT Aero Soln inhalation aerosol 2 Puffs, Inhalation, EVERY 6 HOURS PRN    ALPRAZolam (XANAX) 1 MG Tab PLEASE SEE ATTACHED FOR DETAILED DIRECTIONS    Cholecalciferol (VITAMIN D3) 25 MCG (1000 UT) Cap 1 Capsule, Oral, DAILY    diclofenac sodium (VOLTAREN) 1 % Gel No dose, route, or frequency recorded.    escitalopram (LEXAPRO) 10 mg, Oral, EVERY DAY    fluticasone (FLONASE) 50 MCG/ACT nasal spray ADMINISTER 1 SPRAY INTO AFFECTED NOSTRIL(S) 2 TIMES A DAY FOR 7 DAYS.    levothyroxine (SYNTHROID) 88 mcg, Oral, EACH MORNING ON EMPTY STOMACH    Multiple Vitamins-Minerals (PRESERVISION AREDS 2) Cap 1 Capsule, Oral, 2 TIMES DAILY    multivitamin Tab 1 Tablet, Oral, EVERY EVENING    Orgovyx 120 mg, Oral, DAILY    polyethylene glycol 3350 (MIRALAX) 17 g, Oral, 1 TIME DAILY PRN    tamsulosin (FLOMAX) 0.4 mg, Oral, EVERY BEDTIME       Vitals:    01/15/24 1231   BP: 138/86   Pulse: 74   Resp: 16   Temp: 36.6 °C (97.8 °F)   SpO2: 96%        Allergies   Allergen Reactions    Azithromycin     Augmentin [Amoxicillin-Pot Clavulanate] Vomiting       Lab Results   Component Value Date/Time    SODIUM 140 10/26/2023 07:59 PM    POTASSIUM 4.2 10/26/2023 07:59 PM    CHLORIDE 104 10/26/2023 07:59 PM    CO2 24 10/26/2023 07:59 PM    GLUCOSE 99 10/26/2023 07:59 PM    BUN 25 (H) 10/26/2023 07:59 PM    CREATININE 0.96 10/26/2023 07:59 PM        Lab Results   Component Value Date/Time    WBC 7.6 10/26/2023 07:59 PM    RBC 3.91 (L) 10/26/2023 07:59 PM    HEMOGLOBIN 12.1 (L) 10/26/2023 07:59 PM    HEMATOCRIT 35.8 (L) 10/26/2023 07:59 PM    MCV 91.6 10/26/2023 07:59 PM    MCH 30.9 10/26/2023 07:59 PM    MCHC 33.8 10/26/2023 07:59 PM    MPV 11.5 10/26/2023 07:59 PM    NEUTSPOLYS 69.50 10/26/2023 07:59 PM    LYMPHOCYTES 19.80 (L) 10/26/2023 07:59 PM    MONOCYTES 8.30 10/26/2023 07:59 PM    EOSINOPHILS 1.60 10/26/2023 07:59 PM    BASOPHILS 0.50 10/26/2023 07:59 PM        Relevant Problems   CARDIAC   (positive) Cardiac pacemaker in situ   (positive) RBBB   (positive) Sinus bradycardia      GI   (positive) GERD (gastroesophageal reflux disease)      ENDO   (positive) Hypothyroidism       Physical Exam    Airway   Mallampati: II  TM distance: >3 FB  Neck ROM: full       Cardiovascular - normal exam  Rhythm: regular  Rate: normal  (-) murmur     Dental       Very poor dentition     Pulmonary - normal exam  Breath sounds clear to auscultation     Abdominal    Neurological - normal exam                   Anesthesia Plan    ASA 3   ASA physical status 3 criteria: implanted pacemaker    Plan - general       Airway plan will be ETT          Induction: intravenous    Postoperative Plan: Postoperative administration of opioids is intended.    Pertinent diagnostic labs and testing reviewed    Informed Consent:    Anesthetic plan and risks discussed with patient.      Anesthetic procedure and risks discussed with patient in detail.  Risks include but are not limited to PONV,  pain, sore throat, damage to teeth/lips/gums, aspiration, positioning injury, allergic reaction, vocal cord injury, prolonged intubation, and/or cardiopulmonary problems up to and including death.  Patient indicates complete understanding. All patient/family questions were answered to full satisfaction and they agree to proceed as above planned.

## 2024-01-16 VITALS
BODY MASS INDEX: 24.82 KG/M2 | HEIGHT: 69 IN | TEMPERATURE: 98.5 F | DIASTOLIC BLOOD PRESSURE: 63 MMHG | RESPIRATION RATE: 16 BRPM | HEART RATE: 64 BPM | SYSTOLIC BLOOD PRESSURE: 118 MMHG | OXYGEN SATURATION: 92 % | WEIGHT: 167.55 LBS

## 2024-01-16 PROCEDURE — G0378 HOSPITAL OBSERVATION PER HR: HCPCS

## 2024-01-16 PROCEDURE — 700102 HCHG RX REV CODE 250 W/ 637 OVERRIDE(OP): Performed by: SURGERY

## 2024-01-16 PROCEDURE — 99239 HOSP IP/OBS DSCHRG MGMT >30: CPT | Performed by: INTERNAL MEDICINE

## 2024-01-16 PROCEDURE — 700111 HCHG RX REV CODE 636 W/ 250 OVERRIDE (IP): Mod: JZ | Performed by: INTERNAL MEDICINE

## 2024-01-16 PROCEDURE — 96374 THER/PROPH/DIAG INJ IV PUSH: CPT

## 2024-01-16 PROCEDURE — A9270 NON-COVERED ITEM OR SERVICE: HCPCS | Performed by: SURGERY

## 2024-01-16 RX ORDER — ONDANSETRON 2 MG/ML
4 INJECTION INTRAMUSCULAR; INTRAVENOUS EVERY 4 HOURS PRN
Status: DISCONTINUED | OUTPATIENT
Start: 2024-01-16 | End: 2024-01-16 | Stop reason: HOSPADM

## 2024-01-16 RX ORDER — ONDANSETRON 4 MG/1
4 TABLET, ORALLY DISINTEGRATING ORAL EVERY 4 HOURS PRN
Status: DISCONTINUED | OUTPATIENT
Start: 2024-01-16 | End: 2024-01-16 | Stop reason: HOSPADM

## 2024-01-16 RX ADMIN — ESCITALOPRAM OXALATE 10 MG: 10 TABLET ORAL at 05:15

## 2024-01-16 RX ADMIN — ONDANSETRON 4 MG: 2 INJECTION INTRAMUSCULAR; INTRAVENOUS at 09:46

## 2024-01-16 RX ADMIN — LEVOTHYROXINE SODIUM 88 MCG: 0.09 TABLET ORAL at 05:15

## 2024-01-16 RX ADMIN — OXYCODONE AND ACETAMINOPHEN 1 TABLET: 5; 325 TABLET ORAL at 05:16

## 2024-01-16 NOTE — OR NURSING
Pt transferred back to PACU from Phase II for admission. Report called to floor by Phase II nurse. PACU RN transported pt to .

## 2024-01-16 NOTE — OR NURSING
Patient transported to Phase 2 on room air via gurney accompanied by RN. Vitals stable, no distress noted. Family updated, Report given to TUSHAR Bhatti.

## 2024-01-16 NOTE — OR NURSING
Patient states he feels it is unsafe to go home at this time. Paged [facility] Dr Kemp at 8:56pm, waiting for return call.

## 2024-01-16 NOTE — DISCHARGE PLANNING
HTH/ [facility] TCN chart review completed. Due to low LACE 64 as well as patients currently documented level of function, no TCN needs anticipated in patient discharge planning at this time. Should post acute discharge needs arise warranting TCN involvement, please contact TCN team via Voalte. Thank you.

## 2024-01-16 NOTE — PROGRESS NOTES
4 Eyes Skin Assessment Completed by TUSHAR Deleon and TUSHAR Moctezuma.    Head nose melena (birthmark)  Ears WDL  Nose WDL  Mouth WDL  Neck WDL  Breast/Chest WDL  Shoulder Blades WDL  Spine WDL  (R) Arm/Elbow/Hand WDL  (L) Arm/Elbow/Hand WDL  Abdomen Incision x3 lap sites  Groin WDL  Scrotum/Coccyx/Buttocks WDL  (R) Leg WDL  (L) Leg WDL  (R) Heel/Foot/Toe WDL  (L) Heel/Foot/Toe WDL          Devices In Places Pulse Ox      Interventions In Place Pressure Redistribution Mattress    Possible Skin Injury No    Pictures Uploaded Into Epic N/A  Wound Consult Placed N/A  RN Wound Prevention Protocol Ordered No

## 2024-01-16 NOTE — DISCHARGE SUMMARY
Discharge Summary    CHIEF COMPLAINT ON ADMISSION  No chief complaint on file.      Reason for Admission  Incisional hernia without obstruct*     Admission Date  1/15/2024    CODE STATUS  Prior    HPI & HOSPITAL COURSE  David Darling is a 88 y.o. male with COPD, sick sinus syndrome with pacemaker in place, hypothyroidism, prostate cancer, history of bowel obstruction requiring small bowel resection and ventral hernia repair in June 2023, admitted to the surgical service on 1/15/2024 with recurrent hernia.  Patient underwent elective robotic assisted transabdominal preperitoneal repair of the recurrent incisional hernia.  He was monitored overnight, and his pain was well-controlled.  His diet was advanced which showed tolerated well.  He had return of bowel function.  He remained hemodynamically stable and afebrile.    I have personally seen and examined the patient on the day of discharge. With his clinical improvement, he was deemed ready to discharge from the hospital as he did not have any further hospitalization needs. Patient felt comfortable going home. The discharge plan was discussed with the patient, with which he was agreeable to.     Therefore, he is discharged in good and stable condition to home with close outpatient follow-up.    The patient met 2-midnight criteria for an inpatient stay at the time of discharge.    Discharge Date  1/16/2024      FOLLOW UP ITEMS POST DISCHARGE  -He is given prescription for Percocet.  He is consented to the narcotic prescription.  Consult to keep himself active and mobile.  -Follow-up with surgery (Dr. Kemp)  - counseled to seek immediate medical attention, or return to the ED for recurrent or worsening symptoms.      DISCHARGE DIAGNOSES  Active Problems:    * No active hospital problems. *  Resolved Problems:    * No resolved hospital problems. *      FOLLOW UP  No future appointments.  Mendoza Kemp M.D.  [address] [facility]  [place]  [id number]  [phone]    Follow up        MEDICATIONS ON DISCHARGE     Medication List        START taking these medications        Instructions   oxyCODONE-acetaminophen 5-325 MG Tabs  Commonly known as: Percocet   Take 1 Tablet by mouth every four hours as needed for Severe Pain for up to 5 days.  Dose: 1 Tablet            CONTINUE taking these medications        Instructions   albuterol 108 (90 Base) MCG/ACT Aers inhalation aerosol   Doctor's comments: DX Code Needed  REFILL RENEWAL.  INHALE 2 PUFFS EVERY 6 HOURS AS NEEDED FOR SHORTNESS OF BREATH. INDICATIONS: SPASM OF LUNG AIR PASSAGES  Dose: 2 Puff     ALPRAZolam 1 MG Tabs  Commonly known as: Xanax   Take 1 mg by mouth 3 times a day as needed for Anxiety.  Dose: 1 mg     diclofenac sodium 1 % Gel  Commonly known as: Voltaren   Apply 2 g topically 4 times a day as needed (pain).  Dose: 2 g     escitalopram 10 MG Tabs  Commonly known as: Lexapro   Take 1 Tablet by mouth every day. Indications: Major Depressive Disorder  Dose: 10 mg     fluticasone 50 MCG/ACT nasal spray  Commonly known as: Flonase   Administer 1 Spray into affected nostril(S) 2 times a day.  Dose: 1 Spray     levothyroxine 88 MCG Tabs  Commonly known as: Synthroid   Take 1 Tablet by mouth every morning on an empty stomach. Indications: Underactive Thyroid  Dose: 88 mcg     MiraLax 17 GM/SCOOP Powd  Generic drug: polyethylene glycol 3350   Take 17 g by mouth 1 time a day as needed (constipation).  Dose: 17 g     multivitamin Tabs   Take 1 Tablet by mouth every evening. Indications: Nutritional Support  Dose: 1 Tablet     Orgovyx 120 MG Tabs  Generic drug: Relugolix   Take 120 mg by mouth every day. Indications: Cancer of the Prostate Gland  Dose: 120 mg     PreserVision AREDS 2 Caps   Take 1 Capsule by mouth 2 times a day. Indications: suppliment  Dose: 1 Capsule     tamsulosin 0.4 MG capsule  Commonly known as: Flomax   Take 1 Capsule by mouth at bedtime. Indications: Chronic Prostate Gland  Inflammation  Dose: 0.4 mg     Vitamin D3 25 MCG (1000 UT) Caps   Take 1,000 Units by mouth every day. Indications: Suppliments  Dose: 1,000 Units              Allergies  Allergies   Allergen Reactions    Azithromycin     Augmentin [Amoxicillin-Pot Clavulanate] Vomiting       DIET  Orders Placed This Encounter   Procedures    Diet Order Diet: Low Fiber(GI Soft)     Standing Status:   Standing     Number of Occurrences:   1     Order Specific Question:   Diet:     Answer:   Low Fiber(GI Soft) [2]       ACTIVITY  As tolerated.  Weight bearing as tolerated    CONSULTATIONS  Surgery    PROCEDURES  As above    LABORATORY  Lab Results   Component Value Date    SODIUM 140 10/26/2023    POTASSIUM 4.2 10/26/2023    CHLORIDE 104 10/26/2023    CO2 24 10/26/2023    GLUCOSE 99 10/26/2023    BUN 25 (H) 10/26/2023    CREATININE 0.96 10/26/2023        Lab Results   Component Value Date    WBC 7.6 10/26/2023    HEMOGLOBIN 12.1 (L) 10/26/2023    HEMATOCRIT 35.8 (L) 10/26/2023    PLATELETCT 247 10/26/2023        Total time of the discharge process = 41 minutes.

## 2024-01-16 NOTE — OR NURSING
Patient arrived from OR via [facility]. Report received from Anesthesia and Nursing staff. Vitals stable, no distress noted, orders reviewed and released.

## 2024-01-16 NOTE — CARE PLAN
The patient is Stable - Low risk of patient condition declining or worsening    Shift Goals  Clinical Goals: rest, pain management, mobilize, tolerate food  Patient Goals: rest, home in the morning    Progress made toward(s) clinical / shift goals:    Denies nausea tonight. No emesis. Advance to full diet for breakfast. Advance as tolerated.   Vitals stable. Afebrile.  Lap sites x3 with dermabond, approximated. Abdominal binder in place.    Patient is not progressing towards the following goals:      Problem: Pain - Standard  Goal: Alleviation of pain or a reduction in pain to the patient’s comfort goal  Outcome: Progressing  Note: Percocet given for pain. Pt reports relief. Resting in between care.      Problem: Knowledge Deficit - Standard  Goal: Patient and family/care givers will demonstrate understanding of plan of care, disease process/condition, diagnostic tests and medications  Outcome: Progressing  Note: Pt aware of diagnosis, diet, activity and vitals frequency. Able to make needs known.

## 2024-01-16 NOTE — DISCHARGE INSTRUCTIONS
HOME CARE INSTRUCTIONS    ACTIVITY: Rest and take it easy for the first 24 hours.  A responsible adult is recommended to remain with you during that time.  It is normal to feel sleepy.  We encourage you to not do anything that requires balance, judgment or coordination.    FOR 24 HOURS DO NOT:  Drive, operate machinery or run household appliances.  Drink beer or alcoholic beverages.  Make important decisions or sign legal documents.    SPECIAL INSTRUCTIONS:   1.   The pain medication is extremely constipating.    Take a stool softener and drink lots of water.  It also can be addicting.  Please try to transition to an over the counter pain remedy as soon as possible.    2.   Alternating ice and heat for 30 minutes can greatly reduce your pain.    3.   It's ok to shower on the day after your surgery.   Do not scrub the incisions.    4.   After laparoscopy, it's common to have shoulder pain or pain when you take a deep breath.   If the pain radiates down your arm, call or present to the ER.    5.   Please call for redness or drainage from the wound sites that persists.     6.   Feel free to call with questions at [phone].   If you have paperwork that needs filling out, please contact us at this number.    7.   Follow up with me in 1-2 weeks for your postoperative exam.    8.   Activity restrictions vary according to the surgery.   If it hurts, don't do it.   You may begin light exercise at 7-10 days.    Mendoza Kemp MD [facility]  [phone]: This order will  in 24 hours.    DIET: To avoid nausea, slowly advance diet as tolerated, avoiding spicy or greasy foods for the first day.  Add more substantial food to your diet according to your physician's instructions.  Babies can be fed formula or breast milk as soon as they are hungry.  INCREASE FLUIDS AND FIBER TO AVOID CONSTIPATION.    MEDICATIONS: Resume taking daily medication.  Take prescribed pain medication with food.  If no  medication is prescribed, you may take non-aspirin pain medication if needed.  PAIN MEDICATION CAN BE VERY CONSTIPATING.  Take a stool softener or laxative such as senokot, pericolace, or milk of magnesia if needed.    Prescription given for   oxyCODONE-acetaminophen (PERCOCET) 5-325 MG Tab .  Last pain medication given at -.Tylenol at 3:30 pm, Oxycodone 6:03 pm.     A follow-up appointment should be arranged with your doctor  Mendoza Kemp in [phone].    You should CALL YOUR PHYSICIAN if you develop:  Fever greater than 101 degrees F.  Pain not relieved by medication, or persistent nausea or vomiting.  Excessive bleeding (blood soaking through dressing) or unexpected drainage from the wound.  Extreme redness or swelling around the incision site, drainage of pus or foul smelling drainage.  Inability to urinate or empty your bladder within 8 hours.  Problems with breathing or chest pain.    You should call 911 if you develop problems with breathing or chest pain.  If you are unable to contact your doctor or surgical center, you should go to the nearest emergency room or urgent care center.  Physician's telephone #:  [phone] ;    MILD FLU-LIKE SYMPTOMS ARE NORMAL.  YOU MAY EXPERIENCE GENERALIZED MUSCLE ACHES, THROAT IRRITATION, HEADACHE AND/OR SOME NAUSEA.    If any questions arise, call your doctor.  If your doctor is not available, please feel free to call the Surgical Center at [phone].  The Center is open Monday through Friday from 7AM to 7PM.      A registered nurse may call you a few days after your surgery to see how you are doing after your procedure.    You may also receive a survey in the mail within the next two weeks and we ask that you take a few moments to complete the survey and return it to us.  Our goal is to provide you with very good care and we value your comments.     Depression / Suicide Risk    As you are discharged from this [facility] facility, it is important to learn how  to keep safe from harming yourself.    Recognize the warning signs:  Abrupt changes in personality, positive or negative- including increase in energy   Giving away possessions  Change in eating patterns- significant weight changes-  positive or negative  Change in sleeping patterns- unable to sleep or sleeping all the time   Unwillingness or inability to communicate  Depression  Unusual sadness, discouragement and loneliness  Talk of wanting to die  Neglect of personal appearance   Rebelliousness- reckless behavior  Withdrawal from people/activities they love  Confusion- inability to concentrate     If you or a loved one observes any of these behaviors or has concerns about self-harm, here's what you can do:  Talk about it- your feelings and reasons for harming yourself  Remove any means that you might use to hurt yourself (examples: pills, rope, extension cords, firearm)  Get professional help from the community (Mental Health, Substance Abuse, psychological counseling)  Do not be alone:Call your Safe Contact- someone whom you trust who will be there for you.  Call your local CRISIS HOTLINE 139-2394 or 837-588-2405  Call your local Children's Mobile Crisis Response Team [place] (621) 514-7817 or www.Tutorspree  Call the toll free National Suicide Prevention Hotlines   National Suicide Prevention Lifeline 847-326-FLDF (3630)  National Hope Line Network 800-SUICIDE (564-7475)    I acknowledge receipt and understanding of these Home Care instructions.

## 2024-01-16 NOTE — PROGRESS NOTES
Surgery  POD#1  Kept overnight for pain control etc  Looks well  Ok to d/c per surgery  Pain meds sent yesterday  Appreciate IM assistance  Follow up as scheduled.

## 2024-01-16 NOTE — ANESTHESIA TIME REPORT
Anesthesia Start and Stop Event Times       Date Time Event    1/15/2024 1530 Ready for Procedure     1533 Anesthesia Start     1725 Anesthesia Stop          Responsible Staff  01/15/24      Name Role Begin End    Michael Uribe M.D. Anesth 1533 1725          Overtime Reason:  no overtime (within assigned shift)    Comments:

## 2024-01-16 NOTE — PROGRESS NOTES
Discharge order received. PIV removed, tip intact, no issues noted.  Printed discharge instructions given to pt. All discharge education complete, specifically need to f/u with PCP and come back through the ED for new or worsening symptoms, all questions and concerns were addressed. Walked pt off unit.

## 2024-01-16 NOTE — OP REPORT
OPERATIVE NOTE    PREOPERATIVE DIAGNOSIS: Recurrent incisional hernia    POSTOPERATIVE DIAGNOSIS: Recurrent incisional hernia 5 cm reducible    PROCEDURE PERFORMED: Robotic assisted transabdominal preperitoneal repair of the recurrent incisional hernia 3 to 10 cm reducible    SURGEON: Mendoza Kemp M.D.    ASSISTANT: Darius Okeefe MD    An assistant was required for the safe completion of the surgical case.  The assistant provided retraction, exposure, performed wound closure and assisted with instrumentation promoting the efficiency of the surgery performed.  I felt an assistant was necessary in the interest of safety and efficiency.  My request for assistance is based on my training and experience and should be patently obvious to the most casual observer as necessary.     ANESTHESIOLOGIST:  Anesthesiologist: Michael Uribe M.D.     ANESTHESIA: general     FINDINGS: Large reducible hernia with minimal adhesions..     WOUND CLASS: Clean    SPECIMEN: None    ESTIMATED BLOOD LOSS: 30 mL    Indications: Patient is a 88-year-old male who presents with a recurrent incisional hernia after exploratory laparotomy.  Patient has a symptomatic inguinal hernia and was in fear of having another obstruction.  Patient was counseled extensively as of the risks benefits of surgery and agreed to proceed fully informed.    Description:    The patient was prepped and draped in the standard sterile surgical fashion after induction of general anesthesia. An appropriate timeout was performed and antibiotics delivered. The patient was in the flexed supine position. A left upper quadrant Veress insertion was performed and the abdomen was insufflated to 15 mmHg CO2. A robotic  trocar was applied. 2 more robotic trochars were placed under direct visualization.  I applied tumescent to the preperitoneal space to facilitate dissection, help with hemostasis, and help with pain.  The robot was then docked and I took my position at the  console.    I began with a limited lysis of adhesions.  Using a combination of electrocautery and sharp dissection I created a preperitoneal flap. I dissected to the hernia which I reduced under direct visualization. It was fairly large.  I created a large preperitoneal space with care not to make defects in the peritoneum. I the used 2 x 30 cm 0 V-Loc suture to reapproximate the hernia defect. I then used a measuring device to measure the preperitoneal space. I used a 12 x 16 cm ProGrip mesh which I fashioned a fit the preperitoneal space. This had excellent coverage of the defect and laid flat against the abdominal wall.    I then used a 2 oh 23 cm Stratisfix suture to close the preperitoneal space.  I used a second 2 oh 23 cm Stratus fix suture to use the hernia sac to cover defects made during dissection.  I then used vicryl to close small rents in the peritoneum made during dissection.  This completely had the mesh from the abdominal cavity. Hemostasis was meticulously achieved. The needles were withdrawn under direct visualization.  The robot was then undocked.  Monocryl suture was used to re-approximate the skin edges. Dermabond was applied as a dressing and the patient was extubated, to recovery in satisfactory condition.    Disposition:    To the PACU for recovery. The patient will be discharged should they meet criteria.          ____________________________________     Mendoza Kemp M.D.    DT: 1/15/2024  5:04 PM      Cc: Mohsen Flores M.D.

## 2024-01-16 NOTE — OR NURSING
Arrived from PACU AXO, Pt's VSS; denies N/V; states pain is at tolerable level. Dressing CDI to abdomen. Abdominal binder in place.

## 2024-01-16 NOTE — OR NURSING
Dr. Kemp returned page stating that patient could stay, observation on CDU, and have the medications that he prescribe for home. Orders placed.

## 2024-01-17 ASSESSMENT — PAIN SCALES - GENERAL: PAIN_LEVEL: 0

## 2024-01-17 NOTE — ANESTHESIA POSTPROCEDURE EVALUATION
Patient: David Darling    Procedure Summary       Date: 01/15/24 Room / Location: [place] [address] / SURGERY [facility]    Anesthesia Start: 1533 Anesthesia Stop: 1725    Procedure: ROBOTIC-ASSISTED TRANSABDOMINAL PREPERITONEAL REPAIR OF A 5 CM RECURRENT INCISIONAL HERNIA (Abdomen) Diagnosis: (RECURRENT VENTRAL INCISIONAL HERNIA)    Surgeons: Mendoza Kemp M.D. Responsible Provider: Michael Uribe M.D.    Anesthesia Type: general ASA Status: 3            Final Anesthesia Type: general  Last vitals  BP   Blood Pressure : 118/63    Temp   36.9 °C (98.5 °F)    Pulse   64   Resp   16    SpO2   92 %      Anesthesia Post Evaluation    Patient location during evaluation: PACU  Patient participation: complete - patient participated  Level of consciousness: sleepy but conscious  Pain score: 0    Airway patency: patent  Anesthetic complications: no  Cardiovascular status: hemodynamically stable  Respiratory status: acceptable  Hydration status: balanced    PONV: none          There were no known notable events for this encounter.     Nurse Pain Score: 0 (NPRS)

## 2024-02-16 ENCOUNTER — TELEPHONE (OUTPATIENT)
Dept: URGENT CARE | Facility: CLINIC | Age: 89
End: 2024-02-16

## 2024-02-16 ENCOUNTER — OFFICE VISIT (OUTPATIENT)
Dept: URGENT CARE | Facility: CLINIC | Age: 89
End: 2024-02-16
Payer: MEDICARE

## 2024-02-16 VITALS
HEART RATE: 68 BPM | OXYGEN SATURATION: 96 % | SYSTOLIC BLOOD PRESSURE: 110 MMHG | RESPIRATION RATE: 14 BRPM | BODY MASS INDEX: 25.01 KG/M2 | HEIGHT: 68 IN | WEIGHT: 165 LBS | TEMPERATURE: 98.3 F | DIASTOLIC BLOOD PRESSURE: 60 MMHG

## 2024-02-16 DIAGNOSIS — B33.8 RSV INFECTION: ICD-10-CM

## 2024-02-16 DIAGNOSIS — B96.89 ACUTE BACTERIAL SINUSITIS: ICD-10-CM

## 2024-02-16 DIAGNOSIS — J01.90 ACUTE BACTERIAL SINUSITIS: ICD-10-CM

## 2024-02-16 LAB
FLUAV RNA SPEC QL NAA+PROBE: NEGATIVE
FLUBV RNA SPEC QL NAA+PROBE: POSITIVE
RSV RNA SPEC QL NAA+PROBE: NEGATIVE
SARS-COV-2 RNA RESP QL NAA+PROBE: NEGATIVE

## 2024-02-16 PROCEDURE — 0241U POCT CEPHEID COV-2, FLU A/B, RSV - PCR: CPT | Performed by: NURSE PRACTITIONER

## 2024-02-16 PROCEDURE — 3074F SYST BP LT 130 MM HG: CPT | Performed by: NURSE PRACTITIONER

## 2024-02-16 PROCEDURE — 3078F DIAST BP <80 MM HG: CPT | Performed by: NURSE PRACTITIONER

## 2024-02-16 PROCEDURE — 99213 OFFICE O/P EST LOW 20 MIN: CPT | Performed by: NURSE PRACTITIONER

## 2024-02-16 RX ORDER — ABIRATERONE ACETATE 250 MG/1
TABLET ORAL
COMMUNITY
Start: 2023-09-25

## 2024-02-16 RX ORDER — DOXYCYCLINE HYCLATE 100 MG
100 TABLET ORAL 2 TIMES DAILY
Qty: 14 TABLET | Refills: 0 | Status: SHIPPED | OUTPATIENT
Start: 2024-02-16 | End: 2024-02-23

## 2024-02-16 RX ORDER — BENZONATATE 100 MG/1
100 CAPSULE ORAL 3 TIMES DAILY PRN
Qty: 30 CAPSULE | Refills: 0 | Status: SHIPPED | OUTPATIENT
Start: 2024-02-16

## 2024-02-16 ASSESSMENT — ENCOUNTER SYMPTOMS
COUGH: 1
FEVER: 0
SHORTNESS OF BREATH: 0
SORE THROAT: 1
DIARRHEA: 0
SINUS PAIN: 1
VOMITING: 0
SPUTUM PRODUCTION: 1
WHEEZING: 0
NAUSEA: 1

## 2024-02-16 ASSESSMENT — FIBROSIS 4 INDEX: FIB4 SCORE: 2.05

## 2024-02-16 NOTE — PATIENT INSTRUCTIONS
Symptomatic care.  -Oral hydration and rest.   -Cough control: nonpharmacologic options for cough relief such as throat lozenges, hot tea, honey.  -Over the counter expectorant as directed; Guaifenesin (Mucinex).  -Tylenol for pain and fever as directed.   -Warm salt water gargles.  -OTC Throat lozenges or spray (Cepacol).  -Saline nasal spray.   -Albuterol as needed.    Seek emergency medical care immediately for: Trouble breathing, persistent pain or pressure in the chest, confusion, inability to wake or stay awake, bluish lips or face, persistent tachycardia (fast heart rate), prolonged dizziness, persistent high grade fevers. Follow up for prolonged cough, persistent wheezing, persistent throat pain, difficulty swallowing, persistent fevers, leg swelling, or any other concerns. Follow up with your Primary Care Provider.

## 2024-02-16 NOTE — PROGRESS NOTES
Subjective:     David Darling is a 88 y.o. male who presents for Cough (X 1.5 weeks, severe cough, congestion, concerned about RSV. )      Tightness in chest. Has albuterol, and uses it at night. Also taking an OTC night time cough medication.  Hx of sinusitis.     Cough  This is a new problem. The current episode started 1 to 4 weeks ago. The cough is Productive of purulent sputum. Associated symptoms include nasal congestion, postnasal drip and a sore throat. Pertinent negatives include no chest pain, ear pain, fever, shortness of breath or wheezing.       Past Medical History:   Diagnosis Date    Allergy     Anemia     Arrhythmia     Atrial Fib    Bronchitis 2019    Cancer ([facility]) 2010    Prostate    COPD (chronic obstructive pulmonary disease) ([facility])     Hypoglycemia     Macular degeneration     Muscle disorder     Psychiatric problem     depression and anxiety    Sinusitis, chronic     Strep throat        Past Surgical History:   Procedure Laterality Date    VENTRAL HERNIA REPAIR ROBOTIC XI  1/15/2024    Procedure: ROBOTIC-ASSISTED TRANSABDOMINAL PREPERITONEAL REPAIR OF A 5 CM RECURRENT INCISIONAL HERNIA;  Surgeon: Mendoza Kemp M.D.;  Location: SURGERY [facility];  Service: Gen Robotic    OR EXPLORATORY OF ABDOMEN  6/10/2023    Procedure: LAPAROTOMY, EXPLORATORY - INCARCERATED HERNIA, BOWEL RESECTION;  Surgeon: Darius Sánchez M.D.;  Location: SURGERY [facility];  Service: General    KNEE ARTHROSCOPY         Social History     Socioeconomic History    Marital status: Single     Spouse name: Not on file    Number of children: Not on file    Years of education: Not on file    Highest education level: Not on file   Occupational History    Not on file   Tobacco Use    Smoking status: Never    Smokeless tobacco: Never   Vaping Use    Vaping Use: Never used   Substance and Sexual Activity    Alcohol use: No    Drug use: Yes     Types: Oral     Comment: marijuan tincture  for sleep nightly    Sexual  "activity: Never   Other Topics Concern    Not on file   Social History Narrative    Not on file     Social Determinants of Health     Financial Resource Strain: Not on file   Food Insecurity: Not on file   Transportation Needs: Not on file   Physical Activity: Not on file   Stress: Not on file   Social Connections: Feeling Socially Integrated (6/29/2023)    OASIS : Social Isolation     Frequency of experiencing loneliness or isolation: Never   Intimate Partner Violence: Not on file   Housing Stability: Not on file        Family History   Problem Relation Age of Onset    Cancer Mother     Heart Disease Father     Cancer Father     Stroke Sister         Allergies   Allergen Reactions    Azithromycin     Augmentin [Amoxicillin-Pot Clavulanate] Vomiting       Review of Systems   Constitutional:  Positive for malaise/fatigue. Negative for fever.   HENT:  Positive for congestion, postnasal drip, sinus pain and sore throat. Negative for ear pain.    Respiratory:  Positive for cough and sputum production. Negative for shortness of breath and wheezing.    Cardiovascular:  Negative for chest pain.   Gastrointestinal:  Positive for nausea. Negative for diarrhea and vomiting.   All other systems reviewed and are negative.       Objective:   /60   Pulse 68   Temp 36.8 °C (98.3 °F) (Temporal)   Resp 14   Ht 1.727 m (5' 8\")   Wt 74.8 kg (165 lb)   SpO2 96%   BMI 25.09 kg/m²     Physical Exam  Vitals reviewed.   Constitutional:       General: He is not in acute distress.     Appearance: He is well-developed. He is ill-appearing. He is not toxic-appearing.   HENT:      Head: Normocephalic and atraumatic.      Right Ear: Ear canal and external ear normal. Tympanic membrane is not erythematous.      Left Ear: Ear canal and external ear normal. Tympanic membrane is not erythematous.      Nose: Congestion present.      Right Sinus: No frontal sinus tenderness.      Left Sinus: No frontal sinus tenderness.      " Comments: Thick yellow nasal secretions.      Mouth/Throat:      Mouth: Mucous membranes are moist.      Pharynx: Posterior oropharyngeal erythema present.   Eyes:      Conjunctiva/sclera: Conjunctivae normal.   Cardiovascular:      Rate and Rhythm: Normal rate.   Pulmonary:      Effort: Pulmonary effort is normal. No tachypnea, bradypnea, accessory muscle usage, prolonged expiration, respiratory distress or retractions.      Breath sounds: No stridor. No decreased breath sounds, wheezing, rhonchi or rales.      Comments: Persistent productive cough.   Musculoskeletal:      Cervical back: Neck supple.   Skin:     General: Skin is warm and dry.      Findings: No rash.   Neurological:      Mental Status: He is alert and oriented to person, place, and time.      GCS: GCS eye subscore is 4. GCS verbal subscore is 5. GCS motor subscore is 6.   Psychiatric:         Speech: Speech normal.         Behavior: Behavior normal.         Thought Content: Thought content normal.         Judgment: Judgment normal.         Assessment/Plan:   1. Acute bacterial sinusitis  - POCT CoV-2, Flu A/B, RSV by PCR  - doxycycline (VIBRAMYCIN) 100 MG Tab; Take 1 Tablet by mouth 2 times a day for 7 days.  Dispense: 14 Tablet; Refill: 0  - benzonatate (TESSALON) 100 MG Cap; Take 1 Capsule by mouth 3 times a day as needed for Cough.  Dispense: 30 Capsule; Refill: 0    2. RSV infection  - POCT CoV-2, Flu A/B, RSV by PCR  Results for orders placed or performed in visit on 02/16/24   POCT CoV-2, Flu A/B, RSV by PCR   Result Value Ref Range    SARS-CoV-2 by PCR Negative Negative, Invalid    Influenza virus A RNA Negative Negative, Invalid    Influenza virus B, PCR Positive (A) Negative, Invalid    RSV, PCR Negative Negative, Invalid     Symptomatic care.  -Oral hydration and rest.   -Cough control: nonpharmacologic options for cough relief such as throat lozenges, hot tea, honey.  -Over the counter expectorant as directed; Guaifenesin  (Mucinex).  -Tylenol for pain and fever as directed.   -Warm salt water gargles.  -OTC Throat lozenges or spray (Cepacol).  -Saline nasal spray.   -Albuterol as needed.    Seek emergency medical care immediately for: Trouble breathing, persistent pain or pressure in the chest, confusion, inability to wake or stay awake, bluish lips or face, persistent tachycardia (fast heart rate), prolonged dizziness, persistent high grade fevers. Follow up for prolonged cough, persistent wheezing, persistent throat pain, difficulty swallowing, persistent fevers, leg swelling, or any other concerns. Follow up with your Primary Care Provider.     -Stable Vitals. Non-labored respirations. Discussed initial viral etiology, with progression to ABS. No pneumonia noted. Advised on S&S of PNA with follow up.     Differential diagnosis, natural history, supportive care, and indications for immediate follow-up discussed.

## 2024-02-29 ENCOUNTER — HOSPITAL ENCOUNTER (OUTPATIENT)
Dept: LAB | Facility: MEDICAL CENTER | Age: 89
End: 2024-02-29
Attending: UROLOGY
Payer: MEDICARE

## 2024-02-29 LAB
ALBUMIN SERPL BCP-MCNC: 4.3 G/DL (ref 3.2–4.9)
ALBUMIN/GLOB SERPL: 1.9 G/DL
ALP SERPL-CCNC: 99 U/L (ref 30–99)
ALT SERPL-CCNC: 12 U/L (ref 2–50)
ANION GAP SERPL CALC-SCNC: 12 MMOL/L (ref 7–16)
AST SERPL-CCNC: 19 U/L (ref 12–45)
BILIRUB SERPL-MCNC: 0.4 MG/DL (ref 0.1–1.5)
BUN SERPL-MCNC: 20 MG/DL (ref 8–22)
CALCIUM ALBUM COR SERPL-MCNC: 8.9 MG/DL (ref 8.5–10.5)
CALCIUM SERPL-MCNC: 9.1 MG/DL (ref 8.4–10.2)
CHLORIDE SERPL-SCNC: 105 MMOL/L (ref 96–112)
CO2 SERPL-SCNC: 25 MMOL/L (ref 20–33)
CREAT SERPL-MCNC: 0.89 MG/DL (ref 0.5–1.4)
GFR SERPLBLD CREATININE-BSD FMLA CKD-EPI: 83 ML/MIN/1.73 M 2
GLOBULIN SER CALC-MCNC: 2.3 G/DL (ref 1.9–3.5)
GLUCOSE SERPL-MCNC: 104 MG/DL (ref 65–99)
POTASSIUM SERPL-SCNC: 4.6 MMOL/L (ref 3.6–5.5)
PROT SERPL-MCNC: 6.6 G/DL (ref 6–8.2)
PSA SERPL-MCNC: 0.63 NG/ML (ref 0–4)
SODIUM SERPL-SCNC: 142 MMOL/L (ref 135–145)
TESTOST SERPL-MCNC: <3 NG/DL (ref 175–781)

## 2024-02-29 PROCEDURE — 84403 ASSAY OF TOTAL TESTOSTERONE: CPT

## 2024-02-29 PROCEDURE — 36415 COLL VENOUS BLD VENIPUNCTURE: CPT

## 2024-02-29 PROCEDURE — 80053 COMPREHEN METABOLIC PANEL: CPT

## 2024-02-29 PROCEDURE — 84153 ASSAY OF PSA TOTAL: CPT

## 2024-03-20 ENCOUNTER — NON-PROVIDER VISIT (OUTPATIENT)
Dept: CARDIOLOGY | Facility: MEDICAL CENTER | Age: 89
End: 2024-03-20
Payer: MEDICARE

## 2024-03-20 PROCEDURE — 93294 REM INTERROG EVL PM/LDLS PM: CPT | Performed by: INTERNAL MEDICINE

## 2024-03-20 NOTE — CARDIAC REMOTE MONITOR - SCAN
Device transmission reviewed. Device demonstrated appropriate function.       Electronically Signed by: Titi Rosas M.D.    3/30/2024  2:24 PM

## 2024-04-18 ENCOUNTER — HOSPITAL ENCOUNTER (OUTPATIENT)
Dept: LAB | Facility: MEDICAL CENTER | Age: 89
End: 2024-04-18
Attending: UROLOGY
Payer: MEDICARE

## 2024-04-18 LAB
ALBUMIN SERPL BCP-MCNC: 4.1 G/DL (ref 3.2–4.9)
ALBUMIN/GLOB SERPL: 1.5 G/DL
ALP SERPL-CCNC: 86 U/L (ref 30–99)
ALT SERPL-CCNC: 23 U/L (ref 2–50)
ANION GAP SERPL CALC-SCNC: 11 MMOL/L (ref 7–16)
AST SERPL-CCNC: 22 U/L (ref 12–45)
BILIRUB SERPL-MCNC: 0.6 MG/DL (ref 0.1–1.5)
BUN SERPL-MCNC: 23 MG/DL (ref 8–22)
CALCIUM ALBUM COR SERPL-MCNC: 9.1 MG/DL (ref 8.5–10.5)
CALCIUM SERPL-MCNC: 9.2 MG/DL (ref 8.4–10.2)
CHLORIDE SERPL-SCNC: 105 MMOL/L (ref 96–112)
CO2 SERPL-SCNC: 24 MMOL/L (ref 20–33)
CREAT SERPL-MCNC: 0.99 MG/DL (ref 0.5–1.4)
GFR SERPLBLD CREATININE-BSD FMLA CKD-EPI: 73 ML/MIN/1.73 M 2
GLOBULIN SER CALC-MCNC: 2.7 G/DL (ref 1.9–3.5)
GLUCOSE SERPL-MCNC: 97 MG/DL (ref 65–99)
POTASSIUM SERPL-SCNC: 4.6 MMOL/L (ref 3.6–5.5)
PROT SERPL-MCNC: 6.8 G/DL (ref 6–8.2)
PSA SERPL-MCNC: 0.96 NG/ML (ref 0–4)
SODIUM SERPL-SCNC: 140 MMOL/L (ref 135–145)
TESTOST SERPL-MCNC: <3 NG/DL (ref 175–781)

## 2024-04-18 PROCEDURE — 36415 COLL VENOUS BLD VENIPUNCTURE: CPT

## 2024-04-18 PROCEDURE — 84153 ASSAY OF PSA TOTAL: CPT

## 2024-04-18 PROCEDURE — 84403 ASSAY OF TOTAL TESTOSTERONE: CPT

## 2024-04-18 PROCEDURE — 80053 COMPREHEN METABOLIC PANEL: CPT

## 2024-06-19 ENCOUNTER — NON-PROVIDER VISIT (OUTPATIENT)
Dept: CARDIOLOGY | Facility: MEDICAL CENTER | Age: 89
End: 2024-06-19
Payer: MEDICARE

## 2024-06-19 PROCEDURE — 93294 REM INTERROG EVL PM/LDLS PM: CPT | Performed by: INTERNAL MEDICINE

## 2024-06-19 NOTE — CARDIAC REMOTE MONITOR - SCAN
Device transmission reviewed. Device demonstrated appropriate function.       Electronically Signed by: Brandon Rubin M.D.    6/19/2024  11:53 AM

## 2024-07-23 ENCOUNTER — HOSPITAL ENCOUNTER (OUTPATIENT)
Dept: LAB | Facility: MEDICAL CENTER | Age: 89
End: 2024-07-23
Attending: PHYSICIAN ASSISTANT
Payer: MEDICARE

## 2024-07-23 LAB
ALBUMIN SERPL BCP-MCNC: 4.3 G/DL (ref 3.2–4.9)
ALBUMIN/GLOB SERPL: 1.8 G/DL
ALP SERPL-CCNC: 85 U/L (ref 30–99)
ALT SERPL-CCNC: 14 U/L (ref 2–50)
ANION GAP SERPL CALC-SCNC: 12 MMOL/L (ref 7–16)
AST SERPL-CCNC: 17 U/L (ref 12–45)
BILIRUB SERPL-MCNC: 0.9 MG/DL (ref 0.1–1.5)
BUN SERPL-MCNC: 21 MG/DL (ref 8–22)
CALCIUM ALBUM COR SERPL-MCNC: 8.9 MG/DL (ref 8.5–10.5)
CALCIUM SERPL-MCNC: 9.1 MG/DL (ref 8.4–10.2)
CHLORIDE SERPL-SCNC: 106 MMOL/L (ref 96–112)
CO2 SERPL-SCNC: 23 MMOL/L (ref 20–33)
CREAT SERPL-MCNC: 0.96 MG/DL (ref 0.5–1.4)
FASTING STATUS PATIENT QL REPORTED: NORMAL
GFR SERPLBLD CREATININE-BSD FMLA CKD-EPI: 76 ML/MIN/1.73 M 2
GLOBULIN SER CALC-MCNC: 2.4 G/DL (ref 1.9–3.5)
GLUCOSE SERPL-MCNC: 99 MG/DL (ref 65–99)
POTASSIUM SERPL-SCNC: 4.6 MMOL/L (ref 3.6–5.5)
PROT SERPL-MCNC: 6.7 G/DL (ref 6–8.2)
PSA SERPL-MCNC: 2.14 NG/ML (ref 0–4)
SODIUM SERPL-SCNC: 141 MMOL/L (ref 135–145)
TESTOST SERPL-MCNC: <3 NG/DL (ref 175–781)

## 2024-07-23 PROCEDURE — 84153 ASSAY OF PSA TOTAL: CPT

## 2024-07-23 PROCEDURE — 36415 COLL VENOUS BLD VENIPUNCTURE: CPT

## 2024-07-23 PROCEDURE — 80053 COMPREHEN METABOLIC PANEL: CPT

## 2024-07-23 PROCEDURE — 84403 ASSAY OF TOTAL TESTOSTERONE: CPT

## 2024-08-16 DIAGNOSIS — N40.0 PROSTATIC HYPERPLASIA: ICD-10-CM

## 2024-08-16 NOTE — TELEPHONE ENCOUNTER
Received request via: Pharmacy    Was the patient seen in the last year in this department? No. Last seen 07/26/2023    Does the patient have an active prescription (recently filled or refills available) for medication(s) requested? No    Pharmacy Name: [facility]    Does the patient have shelter Plus and need 100-day supply? (This applies to ALL medications) Yes, quantity updated to 100 days

## 2024-08-19 RX ORDER — TAMSULOSIN HYDROCHLORIDE 0.4 MG/1
0.4 CAPSULE ORAL
Qty: 90 CAPSULE | Refills: 0 | Status: SHIPPED | OUTPATIENT
Start: 2024-08-19

## 2024-08-21 ENCOUNTER — OFFICE VISIT (OUTPATIENT)
Dept: MEDICAL GROUP | Facility: LAB | Age: 89
End: 2024-08-21
Payer: MEDICARE

## 2024-08-21 VITALS
RESPIRATION RATE: 16 BRPM | HEIGHT: 68 IN | DIASTOLIC BLOOD PRESSURE: 74 MMHG | BODY MASS INDEX: 26.52 KG/M2 | WEIGHT: 175 LBS | OXYGEN SATURATION: 97 % | SYSTOLIC BLOOD PRESSURE: 120 MMHG | TEMPERATURE: 97 F | HEART RATE: 86 BPM

## 2024-08-21 DIAGNOSIS — Z91.09 ENVIRONMENTAL ALLERGIES: ICD-10-CM

## 2024-08-21 DIAGNOSIS — E03.9 HYPOTHYROIDISM, UNSPECIFIED TYPE: ICD-10-CM

## 2024-08-21 DIAGNOSIS — I49.5 SICK SINUS SYNDROME (HCC): ICD-10-CM

## 2024-08-21 DIAGNOSIS — F33.42 RECURRENT MAJOR DEPRESSIVE DISORDER, IN FULL REMISSION (HCC): ICD-10-CM

## 2024-08-21 DIAGNOSIS — I70.0 ATHEROSCLEROSIS OF AORTA (HCC): ICD-10-CM

## 2024-08-21 DIAGNOSIS — F41.1 GAD (GENERALIZED ANXIETY DISORDER): ICD-10-CM

## 2024-08-21 DIAGNOSIS — R91.1 PULMONARY NODULE: ICD-10-CM

## 2024-08-21 DIAGNOSIS — D64.9 NORMOCYTIC ANEMIA: ICD-10-CM

## 2024-08-21 PROBLEM — R00.1 SINUS BRADYCARDIA: Status: RESOLVED | Noted: 2021-04-06 | Resolved: 2024-08-21

## 2024-08-21 PROBLEM — K56.609 SMALL BOWEL OBSTRUCTION (HCC): Status: RESOLVED | Noted: 2023-06-10 | Resolved: 2024-08-21

## 2024-08-21 PROCEDURE — 3074F SYST BP LT 130 MM HG: CPT | Performed by: FAMILY MEDICINE

## 2024-08-21 PROCEDURE — 99214 OFFICE O/P EST MOD 30 MIN: CPT | Performed by: FAMILY MEDICINE

## 2024-08-21 PROCEDURE — 3078F DIAST BP <80 MM HG: CPT | Performed by: FAMILY MEDICINE

## 2024-08-21 ASSESSMENT — FIBROSIS 4 INDEX: FIB4 SCORE: 1.62

## 2024-08-21 NOTE — PROGRESS NOTES
"Subjective:     CC: Follow up    HPI:   David is an 89-year-old male who presents to follow-up on multiple chronic conditions.  Has severe environmental allergies at times and is currently using nondrowsy antihistamines as well as occasional Flonase for this.  He is wondering if Kenalog would be an option next year if his allergies are again severe.    Continues to follow with urology for prostate cancer.  Has regular pacer checks for sick sinus syndrome status post pacemaker.      Medications, past medical history, allergies, and social history have been reviewed and updated.      Objective:       Exam:  /74 (BP Location: Left arm, Patient Position: Sitting, BP Cuff Size: Adult)   Pulse 86   Temp 36.1 °C (97 °F) (Temporal)   Resp 16   Ht 1.727 m (5' 8\")   Wt 79.4 kg (175 lb)   SpO2 97%   BMI 26.61 kg/m²  Body mass index is 26.61 kg/m².    Constitutional: Alert. Well appearing. No distress.  Skin: Warm, dry, good turgor, no visible rashes.  Respiratory: Normal effort. Lungs are clear to auscultation bilaterally.  Cardiovascular: Regular rate and rhythm. Normal S1/S2. No murmurs, rubs or gallops.   Neuro: Moves all four extremities. No facial droop.  Psych: Answers questions appropriately. Normal affect and mood.    Assessment & Plan:     89 y.o. male with the following -     1. Hypothyroidism, unspecified type  Continue levothyroxine 88 mcg daily.  Check TSH.  - TSH WITH REFLEX TO FT4; Future    2. Normocytic anemia  Repeat CBC.  - CBC WITH DIFFERENTIAL; Future    3. Environmental allergies  Continue non-drowsy antihistamines, use Flonase more regularly.  Can do Kenalog injection next year if needed.    4. LILIBETH (generalized anxiety disorder)  5. Recurrent major depressive disorder, in full remission (HCC)  Chronic and stable, currently well-controlled on Lexapro 10 mg daily.    6. Atherosclerosis of aorta (HCC)  Incidental finding on imaging.  Holding on statin given lack of other indication at 89 years " old.    7. Sick sinus syndrome (HCC)  Post pacemaker placement, continue follow-up with cardiology.    8. Pulmonary nodule  Noted on CT while hospitalized last year, follow-up CT ordered.  - CT-CHEST (THORAX) W/O; Future    Today's visit is associated with medical care services that serve as the continuing focal point for all necessary health care services.  This includes providing services to the patient on an ongoing basis that results in care that is collaborative and personalized to the patient.       Please note that this note was created using voice recognition software.

## 2024-08-22 DIAGNOSIS — F33.42 RECURRENT MAJOR DEPRESSIVE DISORDER, IN FULL REMISSION (HCC): ICD-10-CM

## 2024-08-22 DIAGNOSIS — E03.9 HYPOTHYROIDISM, UNSPECIFIED TYPE: ICD-10-CM

## 2024-08-22 RX ORDER — ESCITALOPRAM OXALATE 10 MG/1
TABLET ORAL
Qty: 100 TABLET | Refills: 3 | Status: SHIPPED | OUTPATIENT
Start: 2024-08-22

## 2024-08-22 RX ORDER — LEVOTHYROXINE SODIUM 88 UG/1
88 TABLET ORAL
Qty: 100 TABLET | Refills: 3 | Status: SHIPPED | OUTPATIENT
Start: 2024-08-22

## 2024-08-31 ENCOUNTER — OFFICE VISIT (OUTPATIENT)
Dept: URGENT CARE | Facility: CLINIC | Age: 89
End: 2024-08-31
Payer: MEDICARE

## 2024-08-31 VITALS
BODY MASS INDEX: 26.52 KG/M2 | RESPIRATION RATE: 14 BRPM | HEART RATE: 69 BPM | DIASTOLIC BLOOD PRESSURE: 60 MMHG | SYSTOLIC BLOOD PRESSURE: 116 MMHG | HEIGHT: 68 IN | TEMPERATURE: 98.6 F | WEIGHT: 175 LBS | OXYGEN SATURATION: 96 %

## 2024-08-31 DIAGNOSIS — J01.90 ACUTE SINUSITIS, RECURRENCE NOT SPECIFIED, UNSPECIFIED LOCATION: ICD-10-CM

## 2024-08-31 RX ORDER — DOXYCYCLINE HYCLATE 100 MG
100 TABLET ORAL 2 TIMES DAILY
Qty: 14 TABLET | Refills: 0 | Status: SHIPPED | OUTPATIENT
Start: 2024-08-31 | End: 2024-09-07

## 2024-08-31 RX ORDER — PREDNISONE 5 MG/1
5 TABLET ORAL 2 TIMES DAILY
COMMUNITY

## 2024-08-31 RX ORDER — FLUTICASONE PROPIONATE 50 MCG
2 SPRAY, SUSPENSION (ML) NASAL DAILY
Qty: 16 G | Refills: 0 | Status: SHIPPED | OUTPATIENT
Start: 2024-08-31

## 2024-08-31 RX ORDER — FLUOXETINE 10 MG/1
CAPSULE ORAL
COMMUNITY
End: 2024-08-31

## 2024-08-31 ASSESSMENT — FIBROSIS 4 INDEX: FIB4 SCORE: 1.62

## 2024-08-31 NOTE — PROGRESS NOTES
David Darling is a 89 y.o. male who presents for Sinus Problem (X 2 days, sore sinus, irritated throat, tired, concerned about a sinus infection.)      HPI  This is a new problem. David Darling is a 89 y.o. patient who presents to urgent care with c/o: sinus pain and pressure, irritated throat, feeling tired. Thick nasal drainage.  Hx of sinus pain. Denies fever, dizziness, eye drainage, ear pain. Tx tried: antihistamine and intermittent nasal spray.   No other aggravating or alleviating factors.  Denies any other concerns at this time.       ROS See HPI    Allergies:       Allergies   Allergen Reactions    Azithromycin     Augmentin [Amoxicillin-Pot Clavulanate] Vomiting       PMSFS Hx:  Past Medical History:   Diagnosis Date    Allergy     Anemia     Arrhythmia     Atrial Fib    Bronchitis 2019    Cancer ([facility]) 2010    Prostate    COPD (chronic obstructive pulmonary disease) ([facility])     Hypoglycemia     Macular degeneration     Muscle disorder     Psychiatric problem     depression and anxiety    Sinusitis, chronic     Strep throat      Past Surgical History:   Procedure Laterality Date    VENTRAL HERNIA REPAIR ROBOTIC XI  1/15/2024    Procedure: ROBOTIC-ASSISTED TRANSABDOMINAL PREPERITONEAL REPAIR OF A 5 CM RECURRENT INCISIONAL HERNIA;  Surgeon: Mendoza Kemp M.D.;  Location: SURGERY [facility];  Service: Gen Robotic    PA EXPLORATORY OF ABDOMEN  6/10/2023    Procedure: LAPAROTOMY, EXPLORATORY - INCARCERATED HERNIA, BOWEL RESECTION;  Surgeon: Darius Sánchez M.D.;  Location: SURGERY [facility];  Service: General    KNEE ARTHROSCOPY       Family History   Problem Relation Age of Onset    Cancer Mother     Heart Disease Father     Cancer Father     Stroke Sister      Social History     Tobacco Use    Smoking status: Never    Smokeless tobacco: Never   Substance Use Topics    Alcohol use: No       Problems:   Patient Active Problem List   Diagnosis    Seasonal allergies    Elevated PSA    GERD  (gastroesophageal reflux disease)    Decreased libido    Lumbar foraminal stenosis    Actinic keratoses    DDD (degenerative disc disease), lumbar    Secondary adenocarcinoma of bone (HCC)    Prostate CA (HCC)    Hypothyroidism    RBBB    Cardiac pacemaker in situ    Osteoporosis    Hypogonadism    Bradycardia    ED (erectile dysfunction)    Prostatic hyperplasia    Pulmonary nodule    Other specified anemias    LILIBETH (generalized anxiety disorder)    Atherosclerosis of aorta (HCC)    Recurrent major depressive disorder, in full remission (HCC)    Sick sinus syndrome (HCC)       Medications:   Current Outpatient Medications on File Prior to Visit   Medication Sig Dispense Refill    predniSONE (DELTASONE) 5 MG Tab Take 5 mg by mouth 2 times a day.      levothyroxine (SYNTHROID) 88 MCG Tab TAKE 1 TABLET BY MOUTH EVERY MORNING ON AN EMPTY STOMACH. INDICATIONS: UNDERACTIVE THYROID 100 Tablet 3    escitalopram (LEXAPRO) 10 MG Tab TAKE 1 TABLET BY MOUTH EVERY DAY FOR MAJOR DEPRESSIVE DISORDER 100 Tablet 3    tamsulosin (FLOMAX) 0.4 MG capsule TAKE 1 CAPSULE BY MOUTH AT BEDTIME. INDICATIONS: CHRONIC PROSTATE GLAND INFLAMMATION 90 Capsule 0    Abiraterone Acetate 250 MG Tab TAKE 4 TABLETS (1,000MG) BY  MOUTH ONCE DAILY ON AN EMPTY  STOMACH 1 HOUR BEFORE OR 2 HOURS AFTER A MEAL      benzonatate (TESSALON) 100 MG Cap Take 1 Capsule by mouth 3 times a day as needed for Cough. 30 Capsule 0    albuterol 108 (90 Base) MCG/ACT Aero Soln inhalation aerosol INHALE 2 PUFFS EVERY 6 HOURS AS NEEDED FOR SHORTNESS OF BREATH. INDICATIONS: SPASM OF LUNG AIR PASSAGES 8.5 Each 3    diclofenac sodium (VOLTAREN) 1 % Gel Apply 2 g topically 4 times a day as needed (pain).      polyethylene glycol 3350 (MIRALAX) 17 GM/SCOOP Powder Take 17 g by mouth 1 time a day as needed (constipation).      Cholecalciferol (VITAMIN D3) 25 MCG (1000 UT) Cap Take 1,000 Units by mouth every day. Indications: Suppliments      multivitamin Tab Take 1 Tablet by mouth  "every evening. Indications: Nutritional Support      Multiple Vitamins-Minerals (PRESERVISION AREDS 2) Cap Take 1 Capsule by mouth 2 times a day. Indications: suppliment      Relugolix (ORGOVYX) 120 MG Tab Take 120 mg by mouth every day. Indications: Cancer of the Prostate Gland       No current facility-administered medications on file prior to visit.        Objective:     /60   Pulse 69   Temp 37 °C (98.6 °F) (Temporal)   Resp 14   Ht 1.727 m (5' 8\")   Wt 79.4 kg (175 lb)   SpO2 96%   BMI 26.61 kg/m²     Physical Exam  Vitals and nursing note reviewed.   Constitutional:       Appearance: Normal appearance. He is well-developed. He is not ill-appearing or toxic-appearing.   HENT:      Head: Normocephalic.      Right Ear: Hearing, tympanic membrane, ear canal and external ear normal.      Left Ear: Hearing, tympanic membrane, ear canal and external ear normal.      Nose: Mucosal edema and rhinorrhea present.      Right Sinus: Frontal sinus tenderness present. No maxillary sinus tenderness.      Left Sinus: Frontal sinus tenderness present. No maxillary sinus tenderness.      Mouth/Throat:      Lips: Pink.      Pharynx: Uvula midline. Oropharyngeal exudate (purulent PND) present. No posterior oropharyngeal erythema.      Tonsils: No tonsillar abscesses.   Eyes:      General: Lids are normal.      Conjunctiva/sclera: Conjunctivae normal.   Neck:      Trachea: Trachea normal.   Cardiovascular:      Rate and Rhythm: Normal rate and regular rhythm.      Chest Wall: PMI is not displaced.      Pulses: Normal pulses.      Heart sounds: Normal heart sounds.   Pulmonary:      Effort: Pulmonary effort is normal.      Breath sounds: Normal breath sounds.   Musculoskeletal:      Cervical back: Full passive range of motion without pain, normal range of motion and neck supple.   Lymphadenopathy:      Head:      Right side of head: No tonsillar adenopathy.      Left side of head: No tonsillar adenopathy.      Cervical: " No cervical adenopathy.      Upper Body:      Right upper body: No supraclavicular adenopathy.      Left upper body: No supraclavicular adenopathy.   Skin:     General: Skin is warm and dry.      Capillary Refill: Capillary refill takes less than 2 seconds.   Neurological:      Mental Status: He is alert.   Psychiatric:         Mood and Affect: Mood normal.         Speech: Speech normal.         Behavior: Behavior is cooperative.         Thought Content: Thought content normal.         Assessment /Associated Orders:      1. Acute sinusitis, recurrence not specified, unspecified location  doxycycline (VIBRAMYCIN) 100 MG Tab    fluticasone (FLONASE) 50 MCG/ACT nasal spray          Medical Decision Making:    David is a very pleasant 89 y.o. male who is clinically stable at today's acute urgent care visit.  No acute distress noted.  VSS. Appropriate for outpatient care at this time.   Acute problem today with uncertain prognosis.   Educated in proper administration of  prescription medication(s) ordered today including safety, possible SE, risks, benefits, rationale and alternatives to therapy.   Resume OTC antihistamine of choice. Follow manufactures dosing and safety guidelines.     Cool mist humidifier at night prn      Discussed Dx, management options (risks,benefits, and alternatives to planned treatment), natural progression and supportive care.  Expressed understanding and the treatment plan was agreed upon.   Questions were encouraged and answered   Return to urgent care prn if new or worsening sx or if there is no improvement in condition prn.    Educated in Red flags and indications to immediately call 911 or present to the Emergency Department.       Time I spent evaluating David Darling in urgent care today was 31  minutes. This time includes preparing for visit, reviewing any pertinent notes or test results, counseling/education, exam, obtaining HPI, interpretation of lab tests, medication management  and documentation as indicated above.Time does not include separately billable procedures noted .       Please note that this dictation was created using voice recognition software. I have worked with consultants from the vendor as well as technical experts from [facility] to optimize the interface. I have made every reasonable attempt to correct obvious errors, but I expect that there are errors of grammar and possibly content that I did not discover before finalizing the note.  This note was electronically signed by provider

## 2024-09-04 ENCOUNTER — PATIENT MESSAGE (OUTPATIENT)
Dept: HEALTH INFORMATION MANAGEMENT | Facility: OTHER | Age: 89
End: 2024-09-04

## 2024-09-16 ENCOUNTER — TELEPHONE (OUTPATIENT)
Dept: HEALTH INFORMATION MANAGEMENT | Facility: OTHER | Age: 89
End: 2024-09-16

## 2024-09-19 ENCOUNTER — NON-PROVIDER VISIT (OUTPATIENT)
Dept: CARDIOLOGY | Facility: MEDICAL CENTER | Age: 89
End: 2024-09-19
Payer: MEDICARE

## 2024-09-19 NOTE — CARDIAC REMOTE MONITOR - SCAN
Device transmission reviewed. Device demonstrated appropriate function.       Electronically Signed by: Brandon Rubin M.D.    9/20/2024  1:51 PM

## 2024-09-20 PROCEDURE — 93294 REM INTERROG EVL PM/LDLS PM: CPT | Performed by: INTERNAL MEDICINE

## 2024-10-08 ENCOUNTER — HOSPITAL ENCOUNTER (OUTPATIENT)
Dept: LAB | Facility: MEDICAL CENTER | Age: 89
End: 2024-10-08
Attending: FAMILY MEDICINE
Payer: MEDICARE

## 2024-10-08 ENCOUNTER — HOSPITAL ENCOUNTER (OUTPATIENT)
Dept: LAB | Facility: MEDICAL CENTER | Age: 89
End: 2024-10-08
Attending: UROLOGY
Payer: MEDICARE

## 2024-10-08 DIAGNOSIS — E03.9 HYPOTHYROIDISM, UNSPECIFIED TYPE: ICD-10-CM

## 2024-10-08 DIAGNOSIS — D64.9 NORMOCYTIC ANEMIA: ICD-10-CM

## 2024-10-08 LAB
ALBUMIN SERPL BCP-MCNC: 4.3 G/DL (ref 3.2–4.9)
ALBUMIN/GLOB SERPL: 1.7 G/DL
ALP SERPL-CCNC: 101 U/L (ref 30–99)
ALT SERPL-CCNC: 10 U/L (ref 2–50)
ANION GAP SERPL CALC-SCNC: 12 MMOL/L (ref 7–16)
AST SERPL-CCNC: 16 U/L (ref 12–45)
BASOPHILS # BLD AUTO: 1 % (ref 0–1.8)
BASOPHILS # BLD: 0.07 K/UL (ref 0–0.12)
BILIRUB SERPL-MCNC: 1.1 MG/DL (ref 0.1–1.5)
BUN SERPL-MCNC: 24 MG/DL (ref 8–22)
CALCIUM ALBUM COR SERPL-MCNC: 8.9 MG/DL (ref 8.5–10.5)
CALCIUM SERPL-MCNC: 9.1 MG/DL (ref 8.4–10.2)
CHLORIDE SERPL-SCNC: 105 MMOL/L (ref 96–112)
CO2 SERPL-SCNC: 22 MMOL/L (ref 20–33)
CREAT SERPL-MCNC: 1.05 MG/DL (ref 0.5–1.4)
EOSINOPHIL # BLD AUTO: 0.14 K/UL (ref 0–0.51)
EOSINOPHIL NFR BLD: 2 % (ref 0–6.9)
ERYTHROCYTE [DISTWIDTH] IN BLOOD BY AUTOMATED COUNT: 50.4 FL (ref 35.9–50)
FASTING STATUS PATIENT QL REPORTED: NORMAL
GFR SERPLBLD CREATININE-BSD FMLA CKD-EPI: 68 ML/MIN/1.73 M 2
GLOBULIN SER CALC-MCNC: 2.5 G/DL (ref 1.9–3.5)
GLUCOSE SERPL-MCNC: 106 MG/DL (ref 65–99)
HCT VFR BLD AUTO: 40.3 % (ref 42–52)
HGB BLD-MCNC: 12.8 G/DL (ref 14–18)
IMM GRANULOCYTES # BLD AUTO: 0.02 K/UL (ref 0–0.11)
IMM GRANULOCYTES NFR BLD AUTO: 0.3 % (ref 0–0.9)
LYMPHOCYTES # BLD AUTO: 1.49 K/UL (ref 1–4.8)
LYMPHOCYTES NFR BLD: 21.4 % (ref 22–41)
MCH RBC QN AUTO: 31.2 PG (ref 27–33)
MCHC RBC AUTO-ENTMCNC: 31.8 G/DL (ref 32.3–36.5)
MCV RBC AUTO: 98.3 FL (ref 81.4–97.8)
MONOCYTES # BLD AUTO: 0.88 K/UL (ref 0–0.85)
MONOCYTES NFR BLD AUTO: 12.6 % (ref 0–13.4)
NEUTROPHILS # BLD AUTO: 4.37 K/UL (ref 1.82–7.42)
NEUTROPHILS NFR BLD: 62.7 % (ref 44–72)
NRBC # BLD AUTO: 0 K/UL
NRBC BLD-RTO: 0 /100 WBC (ref 0–0.2)
PLATELET # BLD AUTO: 202 K/UL (ref 164–446)
PMV BLD AUTO: 12.7 FL (ref 9–12.9)
POTASSIUM SERPL-SCNC: 4 MMOL/L (ref 3.6–5.5)
PROT SERPL-MCNC: 6.8 G/DL (ref 6–8.2)
RBC # BLD AUTO: 4.1 M/UL (ref 4.7–6.1)
SODIUM SERPL-SCNC: 139 MMOL/L (ref 135–145)
TESTOST SERPL-MCNC: <3 NG/DL (ref 175–781)
TSH SERPL DL<=0.005 MIU/L-ACNC: 3.3 UIU/ML (ref 0.38–5.33)
WBC # BLD AUTO: 7 K/UL (ref 4.8–10.8)

## 2024-10-08 PROCEDURE — 84443 ASSAY THYROID STIM HORMONE: CPT

## 2024-10-08 PROCEDURE — 84403 ASSAY OF TOTAL TESTOSTERONE: CPT

## 2024-10-08 PROCEDURE — 85025 COMPLETE CBC W/AUTO DIFF WBC: CPT

## 2024-10-08 PROCEDURE — 80053 COMPREHEN METABOLIC PANEL: CPT

## 2024-10-08 PROCEDURE — 36415 COLL VENOUS BLD VENIPUNCTURE: CPT

## 2024-10-10 ENCOUNTER — HOSPITAL ENCOUNTER (OUTPATIENT)
Facility: MEDICAL CENTER | Age: 89
End: 2024-10-10
Attending: UROLOGY
Payer: MEDICARE

## 2024-10-10 LAB — PSA SERPL-MCNC: 3.77 NG/ML (ref 0–4)

## 2024-10-10 PROCEDURE — 84153 ASSAY OF PSA TOTAL: CPT

## 2024-11-20 ENCOUNTER — OFFICE VISIT (OUTPATIENT)
Dept: MEDICAL GROUP | Facility: LAB | Age: 89
End: 2024-11-20
Payer: MEDICARE

## 2024-11-20 VITALS
DIASTOLIC BLOOD PRESSURE: 70 MMHG | OXYGEN SATURATION: 97 % | HEIGHT: 68 IN | SYSTOLIC BLOOD PRESSURE: 130 MMHG | HEART RATE: 76 BPM | WEIGHT: 177.2 LBS | TEMPERATURE: 97.3 F | RESPIRATION RATE: 12 BRPM | BODY MASS INDEX: 26.86 KG/M2

## 2024-11-20 DIAGNOSIS — Z23 NEED FOR VACCINATION: ICD-10-CM

## 2024-11-20 DIAGNOSIS — L57.0 ACTINIC KERATOSIS: ICD-10-CM

## 2024-11-20 DIAGNOSIS — J98.01 BRONCHOSPASM: ICD-10-CM

## 2024-11-20 DIAGNOSIS — R91.1 PULMONARY NODULE: ICD-10-CM

## 2024-11-20 DIAGNOSIS — D53.9 MACROCYTIC ANEMIA: ICD-10-CM

## 2024-11-20 DIAGNOSIS — C79.51 SECONDARY ADENOCARCINOMA OF BONE (HCC): ICD-10-CM

## 2024-11-20 DIAGNOSIS — C61 PROSTATE CA (HCC): ICD-10-CM

## 2024-11-20 PROCEDURE — 3078F DIAST BP <80 MM HG: CPT | Performed by: FAMILY MEDICINE

## 2024-11-20 PROCEDURE — 99214 OFFICE O/P EST MOD 30 MIN: CPT | Mod: 25 | Performed by: FAMILY MEDICINE

## 2024-11-20 PROCEDURE — G0008 ADMIN INFLUENZA VIRUS VAC: HCPCS | Performed by: FAMILY MEDICINE

## 2024-11-20 PROCEDURE — 3075F SYST BP GE 130 - 139MM HG: CPT | Performed by: FAMILY MEDICINE

## 2024-11-20 PROCEDURE — 90662 IIV NO PRSV INCREASED AG IM: CPT | Performed by: FAMILY MEDICINE

## 2024-11-20 RX ORDER — ALBUTEROL SULFATE 90 UG/1
2 INHALANT RESPIRATORY (INHALATION) EVERY 6 HOURS PRN
Qty: 8.5 EACH | Refills: 3 | Status: SHIPPED | OUTPATIENT
Start: 2024-11-20

## 2024-11-20 ASSESSMENT — FIBROSIS 4 INDEX: FIB4 SCORE: 2.2

## 2024-11-20 NOTE — PROGRESS NOTES
Subjective:     CC: Follow up    HPI:   David is an 89-year-old male with a medical history that includes current treatment for prostate cancer who presents for follow-up.  Following with urology for prostate cancer treatment and is currently on Xtandi and Orgovyx.  Had CT scan ordered for pulmonary nodule at last visit but has not completed this yet.  Recent labs are reviewed and notable for stable anemia but this did go from normocytic to macrocytic.  Has spot on his nose he would like looked at.    Current Outpatient Medications   Medication Sig Dispense Refill    Enzalutamide (XTANDI PO) Take  by mouth.      fluticasone (FLONASE) 50 MCG/ACT nasal spray Administer 2 Sprays into affected nostril(S) every day. 16 g 0    levothyroxine (SYNTHROID) 88 MCG Tab TAKE 1 TABLET BY MOUTH EVERY MORNING ON AN EMPTY STOMACH. INDICATIONS: UNDERACTIVE THYROID 100 Tablet 3    escitalopram (LEXAPRO) 10 MG Tab TAKE 1 TABLET BY MOUTH EVERY DAY FOR MAJOR DEPRESSIVE DISORDER 100 Tablet 3    tamsulosin (FLOMAX) 0.4 MG capsule TAKE 1 CAPSULE BY MOUTH AT BEDTIME. INDICATIONS: CHRONIC PROSTATE GLAND INFLAMMATION 90 Capsule 0    benzonatate (TESSALON) 100 MG Cap Take 1 Capsule by mouth 3 times a day as needed for Cough. 30 Capsule 0    albuterol 108 (90 Base) MCG/ACT Aero Soln inhalation aerosol INHALE 2 PUFFS EVERY 6 HOURS AS NEEDED FOR SHORTNESS OF BREATH. INDICATIONS: SPASM OF LUNG AIR PASSAGES 8.5 Each 3    diclofenac sodium (VOLTAREN) 1 % Gel Apply 2 g topically 4 times a day as needed (pain).      polyethylene glycol 3350 (MIRALAX) 17 GM/SCOOP Powder Take 17 g by mouth 1 time a day as needed (constipation).      multivitamin Tab Take 1 Tablet by mouth every evening. Indications: Nutritional Support      Multiple Vitamins-Minerals (PRESERVISION AREDS 2) Cap Take 1 Capsule by mouth 2 times a day. Indications: suppliment      Relugolix (ORGOVYX) 120 MG Tab Take 120 mg by mouth every day. Indications: Cancer of the Prostate Gland       "predniSONE (DELTASONE) 5 MG Tab Take 5 mg by mouth 2 times a day. (Patient not taking: Reported on 11/20/2024)      Abiraterone Acetate 250 MG Tab TAKE 4 TABLETS (1,000MG) BY  MOUTH ONCE DAILY ON AN EMPTY  STOMACH 1 HOUR BEFORE OR 2 HOURS AFTER A MEAL (Patient not taking: Reported on 11/20/2024)      Cholecalciferol (VITAMIN D3) 25 MCG (1000 UT) Cap Take 1,000 Units by mouth every day. Indications: Suppliments (Patient not taking: Reported on 11/20/2024)       No current facility-administered medications for this visit.       Medications, past medical history, allergies, and social history have been reviewed and updated.      Objective:       Exam:  /70 (BP Location: Left arm, Patient Position: Sitting, BP Cuff Size: Adult)   Pulse 76   Temp 36.3 °C (97.3 °F) (Temporal)   Resp 12   Ht 1.727 m (5' 8\")   Wt 80.4 kg (177 lb 3.2 oz)   SpO2 97%   BMI 26.94 kg/m²  Body mass index is 26.94 kg/m².    Constitutional: Alert. Well appearing. No distress.  Skin: To the left side of the tip of the nose there is an erythematous patch with central rough protrusion.  ENMT: Moist mucous membranes. Normal dentition.  Respiratory: Normal effort.   Neuro: Moves all four extremities. No facial droop.  Psych: Answers questions appropriately. Normal affect and mood.      Assessment & Plan:     89 y.o. male with the following -     1. Pulmonary nodule  Incidental right lung base groundglass opacity on CT scan in June 2023.  Encouraged to schedule previously ordered CT scan to follow    2. Macrocytic anemia  Anemia stable but newly macrocytic.  Likely secondary to prostate cancer treatment but will check B12 and folate.  - CBC WITH DIFFERENTIAL; Future  - VITAMIN B12; Future  - FOLATE; Future    3. Secondary adenocarcinoma of bone (HCC)  4. Prostate CA (HCC)  Following with urology, currently on Xtandi and Orgovyx    5. Actinic keratosis  To the nose, treated with cryotherapy.    6. Need for vaccination  - Influenza Vaccine, " High Dose (65+ Only)    7. Bronchospasm  Continue albuterol as needed.  - albuterol 108 (90 Base) MCG/ACT Aero Soln inhalation aerosol; Inhale 2 Puffs every 6 hours as needed for Shortness of Breath. Indications: Spasm of Lung Air Passages  Dispense: 8.5 Each; Refill: 3      Please note that this note was created using voice recognition software.

## 2024-12-04 ENCOUNTER — TELEPHONE (OUTPATIENT)
Dept: CARDIOLOGY | Facility: MEDICAL CENTER | Age: 89
End: 2024-12-04
Payer: MEDICARE

## 2024-12-04 NOTE — TELEPHONE ENCOUNTER
AB    Caller: David - Son    Topic/issue: Patients son is calling to get the card for the pace maker for oncology.  Patient is use sure of where it is.  Please Advise.    Callback Number: [phone]    Thank you,  Liat SCHUSTER

## 2024-12-05 NOTE — TELEPHONE ENCOUNTER
Called David back and advised he will need to contact the  as they provide the cards, I provided number [phone] to call to order a new one, David understood, had no further questions and was appreciative of call.

## 2024-12-09 ENCOUNTER — TELEPHONE (OUTPATIENT)
Dept: CARDIOLOGY | Facility: MEDICAL CENTER | Age: 89
End: 2024-12-09
Payer: MEDICARE

## 2024-12-09 NOTE — TELEPHONE ENCOUNTER
----- Message from Debbie TAYA sent at 2024  1:15 PM PST -----  Regarding: After Hours  PATIENT NAME: David Darling  CALLER NAME: David Darling  REASON FOR CALL: Has a pace maker and is wondering why he has not been seen or has any appt coming up regarding his pace maker. Patient is requesting a return call.  PHONE NUMBER: [phone]  CARDIO PROVIDER: Familia Argueta  : 1935  MRN: [id number]

## 2024-12-09 NOTE — TELEPHONE ENCOUNTER
----- Message from Medical Assistant Graham Garcia Ass't sent at 2024  8:26 AM PST -----  Regarding: FW: After Hours    ----- Message -----  From: Debbie Lin  Sent: 2024   1:17 PM PST  To: [facility] Schedulers Pool; [facility] Ma/Rudy  Subject: After Hours                                      PATIENT NAME: David Darling  CALLER NAME: David Darling  REASON FOR CALL: Has a pace maker and is wondering why he has not been seen or has any appt coming up regarding his pace maker. Patient is requesting a return call.  PHONE NUMBER: [phone]  CARDIO PROVIDER: Familia Argueta  : 1935  MRN: [id number]

## 2024-12-20 ENCOUNTER — NON-PROVIDER VISIT (OUTPATIENT)
Dept: CARDIOLOGY | Facility: MEDICAL CENTER | Age: 89
End: 2024-12-20
Payer: MEDICARE

## 2024-12-20 PROCEDURE — 93294 REM INTERROG EVL PM/LDLS PM: CPT | Performed by: STUDENT IN AN ORGANIZED HEALTH CARE EDUCATION/TRAINING PROGRAM

## 2025-01-07 ENCOUNTER — HOSPITAL ENCOUNTER (OUTPATIENT)
Dept: LAB | Facility: MEDICAL CENTER | Age: 89
End: 2025-01-07
Attending: UROLOGY
Payer: MEDICARE

## 2025-01-07 LAB
ALBUMIN SERPL BCP-MCNC: 4.1 G/DL (ref 3.2–4.9)
ALBUMIN/GLOB SERPL: 1.6 G/DL
ALP SERPL-CCNC: 89 U/L (ref 30–99)
ALT SERPL-CCNC: 12 U/L (ref 2–50)
ANION GAP SERPL CALC-SCNC: 9 MMOL/L (ref 7–16)
AST SERPL-CCNC: 16 U/L (ref 12–45)
BILIRUB SERPL-MCNC: 0.6 MG/DL (ref 0.1–1.5)
BUN SERPL-MCNC: 21 MG/DL (ref 8–22)
CALCIUM ALBUM COR SERPL-MCNC: 9.2 MG/DL (ref 8.5–10.5)
CALCIUM SERPL-MCNC: 9.3 MG/DL (ref 8.4–10.2)
CHLORIDE SERPL-SCNC: 106 MMOL/L (ref 96–112)
CO2 SERPL-SCNC: 24 MMOL/L (ref 20–33)
CREAT SERPL-MCNC: 0.87 MG/DL (ref 0.5–1.4)
GFR SERPLBLD CREATININE-BSD FMLA CKD-EPI: 83 ML/MIN/1.73 M 2
GLOBULIN SER CALC-MCNC: 2.5 G/DL (ref 1.9–3.5)
GLUCOSE SERPL-MCNC: 104 MG/DL (ref 65–99)
POTASSIUM SERPL-SCNC: 4.9 MMOL/L (ref 3.6–5.5)
PROT SERPL-MCNC: 6.6 G/DL (ref 6–8.2)
PSA SERPL-MCNC: 10.2 NG/ML (ref 0–4)
SODIUM SERPL-SCNC: 139 MMOL/L (ref 135–145)

## 2025-01-07 PROCEDURE — 36415 COLL VENOUS BLD VENIPUNCTURE: CPT

## 2025-01-07 PROCEDURE — 80053 COMPREHEN METABOLIC PANEL: CPT

## 2025-01-07 PROCEDURE — 84153 ASSAY OF PSA TOTAL: CPT

## 2025-01-07 PROCEDURE — 84403 ASSAY OF TOTAL TESTOSTERONE: CPT

## 2025-01-09 LAB — TESTOST SERPL-MCNC: <3 NG/DL (ref 175–781)

## 2025-02-05 ENCOUNTER — TELEPHONE (OUTPATIENT)
Dept: CARDIOLOGY | Facility: MEDICAL CENTER | Age: 89
End: 2025-02-05
Payer: MEDICARE

## 2025-02-05 NOTE — TELEPHONE ENCOUNTER
ANI ADD for   Last appt 9/1/2021    Remote transmission received via home monitor, patient presenting with new onset AF, full report scanned into Media.     New onset AF  -Episode Onset: 2/4/2025 @ 10:45 PM  -Duration: ongoing at time of transmission (11:16 PM)  -Patient on OAC?: No

## 2025-02-05 NOTE — TELEPHONE ENCOUNTER
If we get a hold of the patient today, he should come into the emergency room so we can start anticoagulation and cardiovert him.  Thank you.

## 2025-02-05 NOTE — TELEPHONE ENCOUNTER
Phone Number Called: [phone]     Call outcome: Unable to reach patient, left voicemail. Awaiting patient call to discuss active or new symptoms.Also to schedule to re-establish, last seen in office 11/8/2021.

## 2025-02-06 NOTE — TELEPHONE ENCOUNTER
"ANGELA RAMIRES, ADD 2/5/25 RE: Pt response to your recommendations. Thank you.      Phone Number Called: [phone]     Message: Called to instruct pt on recommendations from Dr. Ramires, see below.     Informed patient of remote transmission result from 2/4/25, see below.    Pt stated \"Last night I was walking in from the outside and the wind picked up a piece of paper from my hand. The paper landed in the courtyard which has river rock.  Pt went to find it, reached down and was unable to reach, pt knelt down thinking he would be able to reach it, but ended up falling down in to the river rock from the kneeling position. Pt further stated it was extremely taxing physically to get out of river bed. He had to get over to a tree to help assist him to a standing position. Pt did get an abrasion on his head, denies LOC, but was extremely exhausted and extremely weak when he finally got out of there. Pt did not seek medical treatment but his lady friend came over to help him and he ended up staying up late.     Pt currently denies any symptoms, he has his Apple watch to monitor his heart rate.     Instructed pt on Dr. Ramires's recommendation. Pt requests to send a manual remote transmission to verify if he is still in AF. Pt doesn't know how to do that, so requests call from device team to walk him through the instructions.     Reviewed ER precautions with pt. Pt verbalized understanding and states he has no symptoms currently, but will go to the ER if symptoms occur.       TO DEVICE TEAM please follow up re: instruction to pt for sending a manual transmission. Thank you    "

## 2025-02-06 NOTE — TELEPHONE ENCOUNTER
Attempted to call patient, no answer, left VM with direct number provided, and send MyChart message.

## 2025-02-10 NOTE — TELEPHONE ENCOUNTER
Please schedule this patient for a device check and an appointment with an ZACH or cardiologist as soon as possible to evaluate his arrhythmia.  Thank you.

## 2025-02-10 NOTE — TELEPHONE ENCOUNTER
Please see ANI message to schedule patient ASAP for device check and appointment with an ZACH or cardiologist. Thank you.

## 2025-02-10 NOTE — TELEPHONE ENCOUNTER
Phone Number Called: [phone]     Call outcome: Left detailed message for patient. Informed to call back with any additional questions. My chart message sent as well.    Message: Called to notify patient of ANI recommendations.

## 2025-02-20 ENCOUNTER — HOSPITAL ENCOUNTER (OUTPATIENT)
Dept: LAB | Facility: MEDICAL CENTER | Age: 89
End: 2025-02-20
Attending: UROLOGY
Payer: MEDICARE

## 2025-02-20 ENCOUNTER — HOSPITAL ENCOUNTER (OUTPATIENT)
Dept: LAB | Facility: MEDICAL CENTER | Age: 89
End: 2025-02-20
Attending: FAMILY MEDICINE
Payer: MEDICARE

## 2025-02-20 ENCOUNTER — OFFICE VISIT (OUTPATIENT)
Dept: MEDICAL GROUP | Facility: LAB | Age: 89
End: 2025-02-20
Payer: MEDICARE

## 2025-02-20 VITALS
SYSTOLIC BLOOD PRESSURE: 106 MMHG | RESPIRATION RATE: 12 BRPM | DIASTOLIC BLOOD PRESSURE: 60 MMHG | OXYGEN SATURATION: 97 % | WEIGHT: 173.2 LBS | HEIGHT: 68 IN | BODY MASS INDEX: 26.25 KG/M2 | TEMPERATURE: 97.1 F | HEART RATE: 79 BPM

## 2025-02-20 DIAGNOSIS — D53.9 MACROCYTIC ANEMIA: ICD-10-CM

## 2025-02-20 DIAGNOSIS — I49.5 SICK SINUS SYNDROME (HCC): ICD-10-CM

## 2025-02-20 DIAGNOSIS — I48.0 PAROXYSMAL ATRIAL FIBRILLATION (HCC): ICD-10-CM

## 2025-02-20 LAB
ALBUMIN SERPL BCP-MCNC: 4.1 G/DL (ref 3.2–4.9)
ALBUMIN SERPL BCP-MCNC: 4.2 G/DL (ref 3.2–4.9)
ALBUMIN/GLOB SERPL: 1.6 G/DL
ALBUMIN/GLOB SERPL: 1.8 G/DL
ALP SERPL-CCNC: 96 U/L (ref 30–99)
ALP SERPL-CCNC: 96 U/L (ref 30–99)
ALT SERPL-CCNC: 11 U/L (ref 2–50)
ALT SERPL-CCNC: 11 U/L (ref 2–50)
ANION GAP SERPL CALC-SCNC: 10 MMOL/L (ref 7–16)
ANION GAP SERPL CALC-SCNC: 10 MMOL/L (ref 7–16)
AST SERPL-CCNC: 18 U/L (ref 12–45)
AST SERPL-CCNC: 19 U/L (ref 12–45)
BASOPHILS # BLD AUTO: 0.6 % (ref 0–1.8)
BASOPHILS # BLD: 0.05 K/UL (ref 0–0.12)
BILIRUB SERPL-MCNC: 0.8 MG/DL (ref 0.1–1.5)
BILIRUB SERPL-MCNC: 0.8 MG/DL (ref 0.1–1.5)
BUN SERPL-MCNC: 19 MG/DL (ref 8–22)
BUN SERPL-MCNC: 20 MG/DL (ref 8–22)
CALCIUM ALBUM COR SERPL-MCNC: 8.7 MG/DL (ref 8.5–10.5)
CALCIUM ALBUM COR SERPL-MCNC: 9.1 MG/DL (ref 8.5–10.5)
CALCIUM SERPL-MCNC: 8.9 MG/DL (ref 8.5–10.5)
CALCIUM SERPL-MCNC: 9.2 MG/DL (ref 8.5–10.5)
CHLORIDE SERPL-SCNC: 105 MMOL/L (ref 96–112)
CHLORIDE SERPL-SCNC: 105 MMOL/L (ref 96–112)
CO2 SERPL-SCNC: 24 MMOL/L (ref 20–33)
CO2 SERPL-SCNC: 24 MMOL/L (ref 20–33)
CREAT SERPL-MCNC: 0.88 MG/DL (ref 0.5–1.4)
CREAT SERPL-MCNC: 0.9 MG/DL (ref 0.5–1.4)
EOSINOPHIL # BLD AUTO: 0.17 K/UL (ref 0–0.51)
EOSINOPHIL NFR BLD: 1.9 % (ref 0–6.9)
ERYTHROCYTE [DISTWIDTH] IN BLOOD BY AUTOMATED COUNT: 46.7 FL (ref 35.9–50)
FOLATE SERPL-MCNC: 9.4 NG/ML
GFR SERPLBLD CREATININE-BSD FMLA CKD-EPI: 82 ML/MIN/1.73 M 2
GFR SERPLBLD CREATININE-BSD FMLA CKD-EPI: 82 ML/MIN/1.73 M 2
GLOBULIN SER CALC-MCNC: 2.3 G/DL (ref 1.9–3.5)
GLOBULIN SER CALC-MCNC: 2.5 G/DL (ref 1.9–3.5)
GLUCOSE SERPL-MCNC: 110 MG/DL (ref 65–99)
GLUCOSE SERPL-MCNC: 110 MG/DL (ref 65–99)
HCT VFR BLD AUTO: 34.7 % (ref 42–52)
HGB BLD-MCNC: 11.3 G/DL (ref 14–18)
IMM GRANULOCYTES # BLD AUTO: 0.04 K/UL (ref 0–0.11)
IMM GRANULOCYTES NFR BLD AUTO: 0.5 % (ref 0–0.9)
LYMPHOCYTES # BLD AUTO: 1.02 K/UL (ref 1–4.8)
LYMPHOCYTES NFR BLD: 11.7 % (ref 22–41)
MCH RBC QN AUTO: 31.7 PG (ref 27–33)
MCHC RBC AUTO-ENTMCNC: 32.6 G/DL (ref 32.3–36.5)
MCV RBC AUTO: 97.2 FL (ref 81.4–97.8)
MONOCYTES # BLD AUTO: 0.9 K/UL (ref 0–0.85)
MONOCYTES NFR BLD AUTO: 10.3 % (ref 0–13.4)
NEUTROPHILS # BLD AUTO: 6.55 K/UL (ref 1.82–7.42)
NEUTROPHILS NFR BLD: 75 % (ref 44–72)
NRBC # BLD AUTO: 0 K/UL
NRBC BLD-RTO: 0 /100 WBC (ref 0–0.2)
PLATELET # BLD AUTO: 265 K/UL (ref 164–446)
PMV BLD AUTO: 11.7 FL (ref 9–12.9)
POTASSIUM SERPL-SCNC: 4.4 MMOL/L (ref 3.6–5.5)
POTASSIUM SERPL-SCNC: 4.5 MMOL/L (ref 3.6–5.5)
PROT SERPL-MCNC: 6.5 G/DL (ref 6–8.2)
PROT SERPL-MCNC: 6.6 G/DL (ref 6–8.2)
PSA SERPL DL<=0.01 NG/ML-MCNC: 12.6 NG/ML (ref 0–4)
RBC # BLD AUTO: 3.57 M/UL (ref 4.7–6.1)
SODIUM SERPL-SCNC: 139 MMOL/L (ref 135–145)
SODIUM SERPL-SCNC: 139 MMOL/L (ref 135–145)
TESTOST SERPL-MCNC: <3 NG/DL (ref 175–781)
TSH SERPL DL<=0.005 MIU/L-ACNC: 3.56 UIU/ML (ref 0.38–5.33)
VIT B12 SERPL-MCNC: 773 PG/ML (ref 211–911)
WBC # BLD AUTO: 8.7 K/UL (ref 4.8–10.8)

## 2025-02-20 PROCEDURE — 80053 COMPREHEN METABOLIC PANEL: CPT | Mod: 91

## 2025-02-20 PROCEDURE — 80053 COMPREHEN METABOLIC PANEL: CPT

## 2025-02-20 PROCEDURE — 84403 ASSAY OF TOTAL TESTOSTERONE: CPT

## 2025-02-20 PROCEDURE — 82607 VITAMIN B-12: CPT

## 2025-02-20 PROCEDURE — 99215 OFFICE O/P EST HI 40 MIN: CPT | Performed by: FAMILY MEDICINE

## 2025-02-20 PROCEDURE — 84153 ASSAY OF PSA TOTAL: CPT

## 2025-02-20 PROCEDURE — 84443 ASSAY THYROID STIM HORMONE: CPT

## 2025-02-20 PROCEDURE — 3078F DIAST BP <80 MM HG: CPT | Performed by: FAMILY MEDICINE

## 2025-02-20 PROCEDURE — 82746 ASSAY OF FOLIC ACID SERUM: CPT

## 2025-02-20 PROCEDURE — 3074F SYST BP LT 130 MM HG: CPT | Performed by: FAMILY MEDICINE

## 2025-02-20 PROCEDURE — 36415 COLL VENOUS BLD VENIPUNCTURE: CPT

## 2025-02-20 PROCEDURE — 85025 COMPLETE CBC W/AUTO DIFF WBC: CPT

## 2025-02-20 ASSESSMENT — FIBROSIS 4 INDEX: FIB4 SCORE: 2.01

## 2025-02-20 NOTE — PROGRESS NOTES
Subjective:     CC: Follow up, fall, a-fib    HPI:   David is an 89-year-old male with a medical history that includes current treatment for prostate cancer and sick sinus syndrome who presents for follow-up and to discuss recent fall and possible A-fib.    Cardiology notes and telephone encounters are reviewed.  He had new onset A-fib on his remote monitor on 2/4.  Fell into courtyard of [facility] 2/4 and was extremely hard to get up.  Cardiology recommended appointment ASAP for device check.  Had appointment 2/11 with cardiology but he no showed as he was not aware of the appointment.  Reports no additional falls or obvious symptoms since then.  No chest pain, no shortness of breath, no palpitations or obvious irregular rhythm.  No new edema.    Current Outpatient Medications   Medication Sig Dispense Refill    Enzalutamide (XTANDI PO) Take  by mouth.      albuterol 108 (90 Base) MCG/ACT Aero Soln inhalation aerosol Inhale 2 Puffs every 6 hours as needed for Shortness of Breath. Indications: Spasm of Lung Air Passages 8.5 Each 3    levothyroxine (SYNTHROID) 88 MCG Tab TAKE 1 TABLET BY MOUTH EVERY MORNING ON AN EMPTY STOMACH. INDICATIONS: UNDERACTIVE THYROID 100 Tablet 3    escitalopram (LEXAPRO) 10 MG Tab TAKE 1 TABLET BY MOUTH EVERY DAY FOR MAJOR DEPRESSIVE DISORDER 100 Tablet 3    tamsulosin (FLOMAX) 0.4 MG capsule TAKE 1 CAPSULE BY MOUTH AT BEDTIME. INDICATIONS: CHRONIC PROSTATE GLAND INFLAMMATION 90 Capsule 0    diclofenac sodium (VOLTAREN) 1 % Gel Apply 2 g topically 4 times a day as needed (pain).      polyethylene glycol 3350 (MIRALAX) 17 GM/SCOOP Powder Take 17 g by mouth 1 time a day as needed (constipation).      fluticasone (FLONASE) 50 MCG/ACT nasal spray Administer 2 Sprays into affected nostril(S) every day. (Patient not taking: Reported on 2/20/2025) 16 g 0    benzonatate (TESSALON) 100 MG Cap Take 1 Capsule by mouth 3 times a day as needed for Cough. (Patient not taking: Reported on 2/20/2025) 30  "Capsule 0    multivitamin Tab Take 1 Tablet by mouth every evening. Indications: Nutritional Support (Patient not taking: Reported on 2/20/2025)      Multiple Vitamins-Minerals (PRESERVISION AREDS 2) Cap Take 1 Capsule by mouth 2 times a day. Indications: suppliment (Patient not taking: Reported on 2/20/2025)      Relugolix (ORGOVYX) 120 MG Tab Take 120 mg by mouth every day. Indications: Cancer of the Prostate Gland (Patient not taking: Reported on 2/20/2025)       No current facility-administered medications for this visit.       Medications, past medical history, allergies, and social history have been reviewed and updated.      Objective:       Exam:  /60 (BP Location: Left arm, Patient Position: Sitting, BP Cuff Size: Adult)   Pulse 79   Temp 36.2 °C (97.1 °F) (Temporal)   Resp 12   Ht 1.727 m (5' 8\")   Wt 78.6 kg (173 lb 3.2 oz)   SpO2 97%   BMI 26.33 kg/m²  Body mass index is 26.33 kg/m².    Constitutional: Alert. Well appearing. No distress.  Skin: Warm, dry, good turgor, no visible rashes.  ENMT: Moist mucous membranes. Normal dentition.  Respiratory: Normal effort. Lungs are clear to auscultation bilaterally.  Cardiovascular: Regular rate and rhythm.  Heart sounds are distant.  Ext\" No edema  Psych: Answers questions appropriately. Normal affect and mood.    EKG interpretation by me: AV dual placed complexes.  There is some irregularity in the rhythm with possible fibrillatory waves.  Questionable atrial fibrillation.    Assessment & Plan:     89 y.o. male with the following -     1. Paroxysmal atrial fibrillation (HCC)  2. Sick sinus syndrome (HCC)    History of sick sinus syndrome with dual-chamber pacemaker.  Remote transmission via home monitor showed new onset A-fib 2/4.  May have been symptomatic at the time with significant trouble exerting himself after a fall, no symptoms since then.  He had discussed with cardiology but did not end up following up.  No current anticoagulation.  " [id number] score is 2.  EKG today with dual placed rhythm but there is some irregularity concerning for underlying A-fib.  Will hold off on anticoagulation for now pending cardiology recommendations and he is strongly encouraged to contact them for close follow-up with ER precautions in the meantime.  I have also sent message to cardiology.  Check labs as below.    - EKG - Clinic Performed  - CBC WITH DIFFERENTIAL; Future  - Comp Metabolic Panel; Future  - TSH WITH REFLEX TO FT4; Future    My total time spent caring for the patient on the day of the encounter was >40 minutes.   This does not include time spent on separately billable procedures/tests.    Please note that this note was created using voice recognition software.

## 2025-02-21 ENCOUNTER — RESULTS FOLLOW-UP (OUTPATIENT)
Dept: MEDICAL GROUP | Facility: LAB | Age: 89
End: 2025-02-21
Payer: MEDICARE

## 2025-02-21 ENCOUNTER — TELEPHONE (OUTPATIENT)
Dept: MEDICAL GROUP | Facility: LAB | Age: 89
End: 2025-02-21
Payer: MEDICARE

## 2025-02-21 NOTE — TELEPHONE ENCOUNTER
Informed patient for surgery:  no lotions, powder, deodorant or after shave, no make-up, piercings, or jewelry. Fingernails should be clean without nail polish or artificial nails.  If surgery is below the waist avoid a pedicure, no nail polish on toenails.    Patient made aware of date, time and place of Covid test if Covid test is required, and informed that once they get their COVID test:   • Do NOT travel out of home area   • Self -quarantine is recommended to minimizes your risk of exposure before your procedure, if you are unable to self-quarantine, please continue to wear a mask, practice social distancing and perform good hand hygiene.   • Immediately report any new symptoms or suspected/known exposures to COVID-19 cases to your surgeon’s office   o new cough, or cough that gets worse   o difficulty breathing   o sore throat   o stomach problems: nausea, vomiting or diarrhea   o loss of taste or smell   o chills and/or repeated shaking with chills   o muscle pain   o headache   Maintain physical distancing (at least 6 feet) at all times both at home and away from home   Wash hands frequently and thoroughly with soap and water (lather at least 20 seconds) or disinfect with alcohol-based hand  before eating. This should also be done by the person who will be bringing you to and from the procedure.     Up to 2 visitors allowed into the building.  The visitor(s) may have a drink if it is covered with a lid or cap. Please do not drink while care team members are in the room. Cafeteria is open to public.     It will be an expectation for the patient/support person to wear a mask at all times during their stay. If support person wants to leave during the surgical period, please communicate with nursing staff.    Patient is aware they will NOT get notified of a negative Covid Test result if required to take a Covid Test PreOp   Phone Number Called: [phone]    Call outcome: Spoke to patient regarding message below.    Message: Spoke to patient about recent lab work that  routed to me and also told him to call Cardiology and make an appointment with them as soon as possible.

## 2025-03-22 ENCOUNTER — NON-PROVIDER VISIT (OUTPATIENT)
Dept: CARDIOLOGY | Facility: MEDICAL CENTER | Age: 89
End: 2025-03-22
Payer: MEDICARE

## 2025-03-24 PROCEDURE — 93294 REM INTERROG EVL PM/LDLS PM: CPT | Performed by: STUDENT IN AN ORGANIZED HEALTH CARE EDUCATION/TRAINING PROGRAM

## 2025-03-24 NOTE — CARDIAC REMOTE MONITOR - SCAN
Device transmission reviewed. Device demonstrated appropriate function.       Electronically Signed by: Michael Lopez MD, PhD    3/25/2025  5:54 PM

## 2025-05-17 NOTE — PROGRESS NOTES
Cardiology Initial Consultation Note    Date of note:    5/19/2025    Primary Care Provider: Mohsen Flores M.D.  Referring Provider: Self-referred    Patient Name: David Darling   YOB: 1935  MRN:              [id number]    Chief Complaint: Abnormal EKG    History of Present Illness: Mr. David Darling is an 89 y.o. male whose current medical problems include status post pacemaker, hypothyroidism who is here for cardiac consultation for abnormal EKG.    The patient previously saw Dr. Fulton in 9/2021      Cardiovascular Risk Factors:  1. Smoking status: Never smoker  2. Type II Diabetes Mellitus: None/*recently checked  Lab Results   Component Value Date/Time    HBA1C 5.5 09/04/2012 11:28 AM     3. Hypertension: None  4. Dyslipidemia: None/not recently checked  Cholesterol,Tot   Date Value Ref Range Status   08/16/2022 175 100 - 199 mg/dL Final     LDL   Date Value Ref Range Status   08/16/2022 85 <100 mg/dL Final     HDL   Date Value Ref Range Status   08/16/2022 62 >=40 mg/dL Final     Triglycerides   Date Value Ref Range Status   08/16/2022 141 0 - 149 mg/dL Final     5. Family history of early Coronary Artery Disease in a first degree relative (Male less than 55 years of age; Female less than 65 years of age): ***  6.  Obesity and/or Metabolic Syndrome: There is no height or weight on file to calculate BMI.  7. Sedentary lifestyle: ***    ROS      All other systems reviewed and are negative.       Past Medical History[1]      Past Surgical History[2]      Current Medications[3]      Allergies[4]      Family History   Problem Relation Age of Onset    Cancer Mother     Heart Disease Father     Cancer Father     Stroke Sister          Social History     Socioeconomic History    Marital status: Single     Spouse name: Not on file    Number of children: Not on file    Years of education: Not on file    Highest education level: Not on file   Occupational History    Not on file   Tobacco  Use    Smoking status: Never    Smokeless tobacco: Never   Vaping Use    Vaping status: Never Used   Substance and Sexual Activity    Alcohol use: No    Drug use: Yes     Types: Oral     Comment: forrest tincture  for sleep nightly    Sexual activity: Never   Other Topics Concern    Not on file   Social History Narrative    Not on file     Social Drivers of Health     Financial Resource Strain: Not on file   Food Insecurity: Not on file   Transportation Needs: Not on file   Physical Activity: Not on file   Stress: Not on file   Social Connections: Feeling Socially Integrated (6/29/2023)    OASIS : Social Isolation     Frequency of experiencing loneliness or isolation: Never   Intimate Partner Violence: Not on file   Housing Stability: Not on file         Physical Exam:  Ambulatory Vitals  There were no vitals taken for this visit.   Oxygen Therapy:     BP Readings from Last 4 Encounters:   02/20/25 106/60   11/20/24 130/70   08/31/24 116/60   08/21/24 120/74       Weight/BMI: There is no height or weight on file to calculate BMI.  Wt Readings from Last 4 Encounters:   02/20/25 78.6 kg (173 lb 3.2 oz)   11/20/24 80.4 kg (177 lb 3.2 oz)   08/31/24 79.4 kg (175 lb)   08/21/24 79.4 kg (175 lb)         General: Well appearing and in no apparent distress  Eyes: ***nl conjunctiva, no icteric sclera  ENT: normal external appearance of ears, nose, and throat  Neck: ***no visible JVP, *** no carotid bruits  Lungs: normal respiratory effort, CTAB  Heart: ***RRR, ***no murmurs, no rubs or gallops, *** no edema bilateral lower extremities. No LV/RV heave on cardiac palpatation. ***+ bilateral radial pulses.  ***+ bilateral dp pulses.   Abdomen: soft, non tender, non distended, no masses, normal bowel sounds.  No HSM.  Extremities/MSK: no clubbing, no cyanosis  Neurological: No focal sensory deficits  Psychiatric: Appropriate affect, A/O x 3, intact judgement and insight  Skin: Warm extremities      Lab Data Review:  Lab  Results   Component Value Date/Time    CHOLSTRLTOT 175 08/16/2022 03:52 PM    LDL 85 08/16/2022 03:52 PM    HDL 62 08/16/2022 03:52 PM    TRIGLYCERIDE 141 08/16/2022 03:52 PM       Lab Results   Component Value Date/Time    SODIUM 139 02/20/2025 02:19 PM    POTASSIUM 4.4 02/20/2025 02:19 PM    CHLORIDE 105 02/20/2025 02:19 PM    CO2 24 02/20/2025 02:19 PM    GLUCOSE 110 (H) 02/20/2025 02:19 PM    BUN 19 02/20/2025 02:19 PM    CREATININE 0.90 02/20/2025 02:19 PM     Lab Results   Component Value Date/Time    ALKPHOSPHAT 96 02/20/2025 02:19 PM    ASTSGOT 18 02/20/2025 02:19 PM    ALTSGPT 11 02/20/2025 02:19 PM    TBILIRUBIN 0.8 02/20/2025 02:19 PM      Lab Results   Component Value Date/Time    WBC 8.7 02/20/2025 02:17 PM    HEMOGLOBIN 11.3 (L) 02/20/2025 02:17 PM     Lab Results   Component Value Date/Time    HBA1C 5.5 09/04/2012 11:28 AM         Cardiac Imaging and Procedures Review:    EKG dated ***: My personal interpretation is ***    No recent echocardiogram    Assessment & Plan     No diagnosis found.      Shared Medical Decision Making:  Status post pacemaker  - Will interrogate device for any arrhythmias    All of the patient's excellent questions were answered to the best of my knowledge and to his satisfaction.  It was a pleasure seeing Mr. David Darling in my clinic today. No follow-ups on file. Patient is aware to call the cardiology clinic with any questions or concerns.      Sebastien Kraft MD  [facility]  [facility], [address]  [address] [address]  Phone: [phone]  Fax: [phone]         [1]   Past Medical History:  Diagnosis Date    Allergy     Anemia     Arrhythmia     Atrial Fib    Bronchitis 2019    Cancer (HCC) 2010    Prostate    COPD (chronic obstructive pulmonary disease) (HCC)     Hypoglycemia     Macular degeneration     Muscle disorder     Psychiatric problem     depression and anxiety    Sinusitis, chronic      Strep throat    [2]   Past Surgical History:  Procedure Laterality Date    VENTRAL HERNIA REPAIR ROBOTIC XI  1/15/2024    Procedure: ROBOTIC-ASSISTED TRANSABDOMINAL PREPERITONEAL REPAIR OF A 5 CM RECURRENT INCISIONAL HERNIA;  Surgeon: Mendoza Kemp M.D.;  Location: SURGERY [facility];  Service: Gen Robotic    [place] EXPLORATORY OF ABDOMEN  6/10/2023    Procedure: LAPAROTOMY, EXPLORATORY - INCARCERATED HERNIA, BOWEL RESECTION;  Surgeon: Darius Sánchez M.D.;  Location: SURGERY [facility];  Service: General    KNEE ARTHROSCOPY     [3]   Current Outpatient Medications   Medication Sig Dispense Refill    Enzalutamide (XTANDI PO) Take  by mouth.      albuterol 108 (90 Base) MCG/ACT Aero Soln inhalation aerosol Inhale 2 Puffs every 6 hours as needed for Shortness of Breath. Indications: Spasm of Lung Air Passages 8.5 Each 3    levothyroxine (SYNTHROID) 88 MCG Tab TAKE 1 TABLET BY MOUTH EVERY MORNING ON AN EMPTY STOMACH. INDICATIONS: UNDERACTIVE THYROID 100 Tablet 3    escitalopram (LEXAPRO) 10 MG Tab TAKE 1 TABLET BY MOUTH EVERY DAY FOR MAJOR DEPRESSIVE DISORDER 100 Tablet 3    tamsulosin (FLOMAX) 0.4 MG capsule TAKE 1 CAPSULE BY MOUTH AT BEDTIME. INDICATIONS: CHRONIC PROSTATE GLAND INFLAMMATION 90 Capsule 0    diclofenac sodium (VOLTAREN) 1 % Gel Apply 2 g topically 4 times a day as needed (pain).      polyethylene glycol 3350 (MIRALAX) 17 GM/SCOOP Powder Take 17 g by mouth 1 time a day as needed (constipation).       No current facility-administered medications for this visit.   [4]   Allergies  Allergen Reactions    Azithromycin     Augmentin [Amoxicillin-Pot Clavulanate] Vomiting

## 2025-05-19 ENCOUNTER — APPOINTMENT (OUTPATIENT)
Dept: CARDIOLOGY | Facility: MEDICAL CENTER | Age: 89
End: 2025-05-19
Attending: STUDENT IN AN ORGANIZED HEALTH CARE EDUCATION/TRAINING PROGRAM
Payer: MEDICARE

## 2025-06-02 ENCOUNTER — OFFICE VISIT (OUTPATIENT)
Facility: MEDICAL CENTER | Age: 89
End: 2025-06-02
Attending: NURSE PRACTITIONER
Payer: MEDICARE

## 2025-06-02 ENCOUNTER — NON-PROVIDER VISIT (OUTPATIENT)
Facility: MEDICAL CENTER | Age: 89
End: 2025-06-02
Attending: NURSE PRACTITIONER
Payer: MEDICARE

## 2025-06-02 VITALS
RESPIRATION RATE: 15 BRPM | SYSTOLIC BLOOD PRESSURE: 100 MMHG | HEIGHT: 68 IN | HEART RATE: 89 BPM | DIASTOLIC BLOOD PRESSURE: 60 MMHG | WEIGHT: 173 LBS | BODY MASS INDEX: 26.22 KG/M2 | OXYGEN SATURATION: 98 %

## 2025-06-02 VITALS
HEART RATE: 89 BPM | WEIGHT: 173 LBS | HEIGHT: 68 IN | SYSTOLIC BLOOD PRESSURE: 100 MMHG | BODY MASS INDEX: 26.22 KG/M2 | DIASTOLIC BLOOD PRESSURE: 60 MMHG | OXYGEN SATURATION: 98 % | RESPIRATION RATE: 15 BRPM

## 2025-06-02 DIAGNOSIS — E03.9 HYPOTHYROIDISM, UNSPECIFIED TYPE: ICD-10-CM

## 2025-06-02 DIAGNOSIS — Z95.0 CARDIAC PACEMAKER IN SITU: Primary | ICD-10-CM

## 2025-06-02 DIAGNOSIS — I48.0 PAROXYSMAL ATRIAL FIBRILLATION (HCC): ICD-10-CM

## 2025-06-02 DIAGNOSIS — K21.9 GASTROESOPHAGEAL REFLUX DISEASE WITHOUT ESOPHAGITIS: ICD-10-CM

## 2025-06-02 DIAGNOSIS — I44.2 AV BLOCK, COMPLETE (HCC): ICD-10-CM

## 2025-06-02 DIAGNOSIS — I70.0 ATHEROSCLEROSIS OF AORTA (HCC): ICD-10-CM

## 2025-06-02 DIAGNOSIS — I49.5 SICK SINUS SYNDROME (HCC): ICD-10-CM

## 2025-06-02 DIAGNOSIS — R00.1 BRADYCARDIA: ICD-10-CM

## 2025-06-02 DIAGNOSIS — I77.810 ASCENDING AORTA DILATATION (HCC): ICD-10-CM

## 2025-06-02 DIAGNOSIS — C61 PROSTATE CA (HCC): ICD-10-CM

## 2025-06-02 PROCEDURE — 99213 OFFICE O/P EST LOW 20 MIN: CPT | Performed by: NURSE PRACTITIONER

## 2025-06-02 PROCEDURE — 93288 INTERROG EVL PM/LDLS PM IP: CPT | Mod: 26 | Performed by: NURSE PRACTITIONER

## 2025-06-02 PROCEDURE — 3078F DIAST BP <80 MM HG: CPT | Performed by: NURSE PRACTITIONER

## 2025-06-02 PROCEDURE — 3074F SYST BP LT 130 MM HG: CPT | Performed by: NURSE PRACTITIONER

## 2025-06-02 PROCEDURE — 99204 OFFICE O/P NEW MOD 45 MIN: CPT | Performed by: NURSE PRACTITIONER

## 2025-06-02 RX ORDER — RELUGOLIX 120 MG/1
TABLET, FILM COATED ORAL
COMMUNITY
Start: 2025-05-12

## 2025-06-02 ASSESSMENT — ENCOUNTER SYMPTOMS
ABDOMINAL PAIN: 0
DIZZINESS: 0
COUGH: 0
BRUISES/BLEEDS EASILY: 0
NAUSEA: 0
CHILLS: 0
LOSS OF CONSCIOUSNESS: 0
ORTHOPNEA: 0
SHORTNESS OF BREATH: 0
PALPITATIONS: 0
HEADACHES: 0
INSOMNIA: 0
MYALGIAS: 0
FEVER: 0
PND: 0

## 2025-06-02 ASSESSMENT — FIBROSIS 4 INDEX
FIB4 SCORE: 1.8
FIB4 SCORE: 1.8

## 2025-06-02 NOTE — PROGRESS NOTES
Chief Complaint   Patient presents with    Follow-Up    Pacemaker Check/Dysfunction    Bradycardia    AV Block Complete       Subjective     David Darling is a 89 y.o. male who presents today for overdue follow-up and PM check for SSS/AV block, and PAFib.    David is an 89 year old male with history of SSS/high AV block with PM since September 2021, and PAFib (not on DOAC) last seen by Dr. Fulton in September 2021.    He is here today for overdue PM interrogation and to re-establish care.  He does have prostate cancer, and is treated/followed by urology.    He denies any chest pain, pressure, tightness or discomfort; no symptomatic palpitations; no shortness of breath, orthopnea or PND; no dizziness or syncope; no LE edema.    He has been told in the past that he has a dilated aorta, which hasn't been checked in years.    He does not check his BP at home.    Cardiovascular Risk Factors:  1. Smoking status: no  2. Type II Diabetes Mellitus: no  3. Hypertension: no  4. Dyslipidemia: no  5. Family history of early Coronary Artery Disease in a first degree relative (Male less than 55 years of age; Female less than 65 years of age): no  6.  Obesity and/or Metabolic Syndrome: BMI 26.30   7. Sedentary lifestyle: yes     Past Medical History[1]  Past Surgical History[2]  Family History   Problem Relation Age of Onset    Cancer Mother     Heart Disease Father     Cancer Father     Stroke Sister      Social History     Socioeconomic History    Marital status: Single     Spouse name: Not on file    Number of children: Not on file    Years of education: Not on file    Highest education level: Not on file   Occupational History    Not on file   Tobacco Use    Smoking status: Never    Smokeless tobacco: Never   Vaping Use    Vaping status: Never Used   Substance and Sexual Activity    Alcohol use: No    Drug use: Yes     Types: Oral     Comment: marijuan tincture  for sleep nightly    Sexual activity: Never   Other Topics  "Concern    Not on file   Social History Narrative    Not on file     Social Drivers of Health     Financial Resource Strain: Not on file   Food Insecurity: Not on file   Transportation Needs: Not on file   Physical Activity: Not on file   Stress: Not on file   Social Connections: Feeling Socially Integrated (6/29/2023)    OASIS : Social Isolation     Frequency of experiencing loneliness or isolation: Never   Intimate Partner Violence: Not on file   Housing Stability: Not on file     Allergies[3]  Encounter Medications[4]  Review of Systems   Constitutional:  Positive for malaise/fatigue. Negative for chills and fever.   HENT:  Negative for congestion.    Respiratory:  Negative for cough and shortness of breath.    Cardiovascular:  Negative for chest pain, palpitations, orthopnea, leg swelling and PND.   Gastrointestinal:  Negative for abdominal pain and nausea.   Musculoskeletal:  Negative for myalgias.   Skin:  Negative for rash.   Neurological:  Negative for dizziness, loss of consciousness and headaches.   Endo/Heme/Allergies:  Does not bruise/bleed easily.   Psychiatric/Behavioral:  The patient does not have insomnia.               Objective     /60 (BP Location: Left arm, Patient Position: Sitting, BP Cuff Size: Adult)   Pulse 89   Resp 15   Ht 1.727 m (5' 8\")   Wt 78.5 kg (173 lb)   SpO2 98%   BMI 26.30 kg/m²     Physical Exam  Constitutional:       Appearance: He is well-developed.   HENT:      Head: Normocephalic.   Neck:      Vascular: No JVD.   Cardiovascular:      Rate and Rhythm: Normal rate and regular rhythm.      Heart sounds: Normal heart sounds.      Comments: PM in left chest wall.  Pulmonary:      Effort: Pulmonary effort is normal. No respiratory distress.      Breath sounds: Normal breath sounds. No wheezing or rales.   Abdominal:      General: Bowel sounds are normal. There is no distension.      Palpations: Abdomen is soft.      Tenderness: There is no abdominal tenderness. "   Musculoskeletal:         General: Normal range of motion.      Cervical back: Normal range of motion and neck supple.   Skin:     General: Skin is warm and dry.      Findings: No rash.   Neurological:      Mental Status: He is alert and oriented to person, place, and time.   Psychiatric:         Mood and Affect: Mood normal.         Behavior: Behavior normal.       DEVICE INTERROGATION:    PM is working normally. Presenting rhythm today is sinus with high AV block; 40 mode switching episodes since February 2022 (longest episode 17+ hours in February 2025); no changes are made today. Battery is good for 7.8 years.     LABS:    Lab Results   Component Value Date/Time    SODIUM 139 02/20/2025 02:19 PM    POTASSIUM 4.4 02/20/2025 02:19 PM    CHLORIDE 105 02/20/2025 02:19 PM    CO2 24 02/20/2025 02:19 PM    GLUCOSE 110 (H) 02/20/2025 02:19 PM    BUN 19 02/20/2025 02:19 PM    CREATININE 0.90 02/20/2025 02:19 PM      Lab Results   Component Value Date/Time    WBC 8.7 02/20/2025 02:17 PM    RBC 3.57 (L) 02/20/2025 02:17 PM    HEMOGLOBIN 11.3 (L) 02/20/2025 02:17 PM    HEMATOCRIT 34.7 (L) 02/20/2025 02:17 PM    MCV 97.2 02/20/2025 02:17 PM    MCH 31.7 02/20/2025 02:17 PM    MCHC 32.6 02/20/2025 02:17 PM    MPV 11.7 02/20/2025 02:17 PM       Lab Results   Component Value Date/Time    CHOLSTRLTOT 175 08/16/2022 03:52 PM    LDL 85 08/16/2022 03:52 PM    HDL 62 08/16/2022 03:52 PM    TRIGLYCERIDE 141 08/16/2022 03:52 PM        Lab Results   Component Value Date/Time    ASTSGOT 18 02/20/2025 02:19 PM    ALTSGPT 11 02/20/2025 02:19 PM           Assessment & Plan     1. Cardiac pacemaker in situ        2. Sick sinus syndrome (HCC)        3. AV block, complete (HCC)        4. Paroxysmal atrial fibrillation (HCC)        5. Ascending aorta dilatation (HCC)  EC-ECHOCARDIOGRAM COMPLETE W/O CONT    Lipid Profile      6. Atherosclerosis of aorta (HCC)  Lipid Profile      7. Prostate CA (HCC)        8. Gastroesophageal reflux disease  without esophagitis        9. Hypothyroidism, unspecified type  TSH          Medical Decision Making: Today's Assessment/Status/Plan:        Sick sinus syndrome with high AV block, with PAFib, longest episode 17+ hours on February 2025. He is SFB5CT7-RBJg score 3 (age, vascular disease), and ideally should be anticoagulated. He would like to think about this, and let us know his decision. He understands his risk of choosing to not be anticoagulated. To recheck PM in 6 months.  Ascending aorta dilatation, to repeat echo.  Atherosclerosis of aorta, not currently on any statin (OK for age).  Prostate cancer, treated/followed by urology.  GERD, currently stable.  Hypothyroidism, treated with Synthroid. TSH in February 2025 was normal.    As above, we discussed need for DOAC; he would like to think about this, and let us know his decision; he understands his risk, and is of sound mind.  To repeat lipid panel.  Same medications for now.    Follow-up in 6 months for next PM check, as well as follow-up with cardiologist to re-establish care (previous patient of Dr. Fulton).                     [1]   Past Medical History:  Diagnosis Date    Allergy     Anemia     Arrhythmia     Atrial Fib    Bronchitis 2019    Cancer (HCC) 2010    Prostate    COPD (chronic obstructive pulmonary disease) (HCC)     Hypoglycemia     Macular degeneration     Muscle disorder     Psychiatric problem     depression and anxiety    Sinusitis, chronic     Strep throat    [2]   Past Surgical History:  Procedure Laterality Date    VENTRAL HERNIA REPAIR ROBOTIC XI  1/15/2024    Procedure: ROBOTIC-ASSISTED TRANSABDOMINAL PREPERITONEAL REPAIR OF A 5 CM RECURRENT INCISIONAL HERNIA;  Surgeon: Mendoza Kemp M.D.;  Location: SURGERY [facility];  Service: Gen Robotic    [place] EXPLORATORY OF ABDOMEN  6/10/2023    Procedure: LAPAROTOMY, EXPLORATORY - INCARCERATED HERNIA, BOWEL RESECTION;  Surgeon: Darius Sánchez M.D.;  Location: SURGERY [facility];  Service:  General    KNEE ARTHROSCOPY     [3]   Allergies  Allergen Reactions    Azithromycin     Augmentin [Amoxicillin-Pot Clavulanate] Vomiting   [4]   Outpatient Encounter Medications as of 6/2/2025   Medication Sig Dispense Refill    ORGOVYX 120 MG Tab       Enzalutamide (XTANDI PO) Take  by mouth.      albuterol 108 (90 Base) MCG/ACT Aero Soln inhalation aerosol Inhale 2 Puffs every 6 hours as needed for Shortness of Breath. Indications: Spasm of Lung Air Passages 8.5 Each 3    levothyroxine (SYNTHROID) 88 MCG Tab TAKE 1 TABLET BY MOUTH EVERY MORNING ON AN EMPTY STOMACH. INDICATIONS: UNDERACTIVE THYROID 100 Tablet 3    escitalopram (LEXAPRO) 10 MG Tab TAKE 1 TABLET BY MOUTH EVERY DAY FOR MAJOR DEPRESSIVE DISORDER 100 Tablet 3    tamsulosin (FLOMAX) 0.4 MG capsule TAKE 1 CAPSULE BY MOUTH AT BEDTIME. INDICATIONS: CHRONIC PROSTATE GLAND INFLAMMATION 90 Capsule 0    diclofenac sodium (VOLTAREN) 1 % Gel Apply 2 g topically 4 times a day as needed (pain).      polyethylene glycol 3350 (MIRALAX) 17 GM/SCOOP Powder Take 17 g by mouth 1 time a day as needed (constipation).       No facility-administered encounter medications on file as of 6/2/2025.

## 2025-06-09 NOTE — Clinical Note
[facility]  [address] [place] [address]    [id number]    David Darling  [address]    [place] [address]    June 9, 2025    Member Name: David Darling   Member Number: [id number]   Reference Number: [id number]   Approved Services: Echos and EKG   Approved Service Dates: 06/09/2025 - 10/07/2025   Requesting Provider: Elizabeth Argueta   Requested Provider: [facility]     Dear David Darling:    The following medical service(s) requested by Elizabeth Argueta have been approved:    Procedure Code Procedure Code Name Requested Quantity Approved Quantity Status   06535 (CPT®) CA ECHO HEART XTHORACIC,COMPLETE W DOPPLER 1 1 Authorized       Approved Quantity means the number of visits approved for medication treatments and/or medical services.    The services should be provided by [facility] no later than 10/07/2025. Please contact the provider listed below with any questions.     Provider Information:  [facility]  [phone]    Your plan benefit may require a deductible, co-payment or coinsurance for these services. This authorization does not guarantee [facility] will pay the claim for services that you receive. Payment by [facility] for these services is subject to the terms of your Evidence of Coverage, your eligibility at the time of service, and confirmation of benefit coverage.    For any questions or additional information, please contact Customer Service:    [facility] Toll Free: [phone]  [employer] users dial: [phone]   Call Center Hours:  Oct 1 - Mar 31, Mon - Fri 7 AM to 8 PM PST  Oct 1 - Mar 31, Sat - Sun 8 AM to 8 PM PST  Apr 1 - Sep 30, Mon - Fri 7 AM to 8 PM PST   Office Hours: Mon - Fri 8 AM to 5 PM PST   E-mail: [email or web address]   Website:  [email or web address]      This information is available for free in other languages. Please contact  Customer Service at the phone number above for more information. [facility] complies with applicable Federal civil rights laws and does not discriminate on the basis of race, color, national origin, age, disability or sex.    Sincerely,     Healthcare Utilization Management Department     Cc: [facility]   Elizabeth Argueta    Multi-Language Insert  Multi- Services  English: We have free  services to answer any questions you may have about our health or drug plan.  To get an , just call us at [phone].  Someone who speaks English/Language can help you.  This is a free service.  Czech: Tenemos servicios de intérprete sin costo alguno  para responder cualquier pregunta que pueda tener sobre nuestro plan de grabiel o medicamentos. Para hablar con un intérprete, por favor llame al [phone]. Alguien que hable español le podrá ayudar. Sharmila es un servicio gratuito.  Chinese Mandarin: ?????????????????????????????? ???????????????? [phone]????????????????? ?????????  Chinese Cantonese: ?????????????????????????????? ????????????? [phone]???????????????????? ????????  Tagalog:  Mayroon kaming libreng serbisyo sa vargassasalparvin mathews o panggamot.  pawan Quintana  [phone]. Maaari carole Damon.  Rufino barahona.  Lithuanian:  Nous proposons savannah services gratuits d'interprétation pour répondre à toutes román questions relatives à notre régime de santé ou d'assurance-médicaments. Pour accéder au service d'interprétation, il vous suffit de nous appeler au [phone]. Un interlocuteur parBellin Health's Bellin Memorial Hospitalnatalie Françmona pourra vous aider. Ce service est gratuit.  Monegasque:  Desmond cheek có d?ch v? thông d?ch mi?n phí ð? tr? l?i các câu h?i v? chýõng s?c kh?e và chýõng trìn thu?c men. N?u  quí v? c?n thông d?ch viên mita g?i [phone] s? có nhân viên nói ti?ng Vi?t giúp ð? quí v?. Ðây là d?ch v? mi?n phí .  Malay:  Unser kostenloser Dolmetscherservice beantwortet Ihren Fragen zu unserem Gesundheits- und Arzneimittelplan. Unsere Dolmetscher erreichen Sie [phone]. Man wird Ihnen radha auf [facility]. Dieser Service ist [facility] [place].  Amharic:  ??? ?? ?? ?? ?? ??? ?? ??? ?? ???? ?? ?? ???? ???? ????. ?? ???? ????? ?? [phone] ??? ??? ????.  ???? ?? ???? ?? ?? ????. ? ???? ??? ?????.   Citizen of [place]: Åñëè ó âàñ âîçíèêíóò âîïðîñû îòíîñèòåëüíî ñòðàõîâîãî èëè ìåäèêàìåíòíîãî ïëàíà, âû ìîæåòå âîñïîëüçîâàòüñÿ íàøèìè áåñïëàòíûìè óñëóãàìè ïåðåâîä÷èêîâ. ×òîáû âîñïîëüçîâàòüñÿ óñëóãàìè ïåðåâîä÷èêà, ïîçâîíèòå íàì ïî òåëåôîíó [phone]. Âàì îêàæåò ïîìîùü ñîòðóäíèê, êîòîðûé ãîâîðèò ïî-póññêè. Äàííàÿ óñëóãà áåñïëàòíàÿ.  Bulgarian: ÅääÇ äÞÏã ÎÏãÇÊ ÇáãÊÑÌã ÇáÝæÑí ÇáãÌÇäíÉ ááÅÌÇÈÉ Úä Ãí ÃÓÆáÉ ÊÊÚáÞ ÈÇáÕÍÉ Ãæ ÌÏæá ÇáÃÏæíÉ áÏíäÇ. ááÍÕæá Úáì ãÊÑÌã ÝæÑí¡ áíÓ Úáíß Óæì ÇáÇÊÕÇá ÈäÇ Úáì [phone] . ÓíÞæã ÔÎÕ ãÇ íÊÍÏË ÇáÚÑÈíÉ ÈãÓÇÚÏÊß. åÐå ÎÏãÉ ãÌÇäíÉ.  Jaylene: ????? ????????? ?? ??? ?? ????? ?? ???? ??? ???? ???? ?? ?????? ?? ???? ???? ?? ??? ????? ??? ????? ???????? ?????? ?????? ???. ?? ???????? ??????? ???? ?? ???, ?? ???? [phone] ?? ??? ????. ??? ??????? ?? ?????? ????? ?? ???? ??? ?? ???? ??. ?? ?? ????? ???? ??.   Malawian:  È disponibile un servizio di interpretariato gratuito per rispondere a eventuali domande sul nostro piano sanitario e farmaceutico. Per un interprete, contattare il marivel [phone]. Un nostro incaricato mary parla Italianovi fornirà l'assistenza necessaria. È un servizio gratuito.  Portugués:  Dispomos de serviços de interpretação gratuitos para responder a qualquer questão que tenha acerca do nosso plano de saúde ou de medicação. Para obter um intérprete, contacte-nos através do número [phone]. Irá encontrar alguém que fale o idioma  Português para o ajudar. Sharmila  serviço é gratuito.  Mongolian Creole:  Nou genyen sèvis entèprèt gratis ashley reponn tout kesyon ou ta genyen konsènan plan medikal oswa dwòg nou an.  Ashley jwenn yon entèprèt, jis rele nou nan [phone]. Yon moun ki pale Kreyòl kapab edson w.  Sa a se yon sèvis ki gratis.  Polish:  Umo¿liwiamy bezp³atne skorzystanie z us³ug t³umacza ustnego, który pomo¿e w uzyskaniu odpowiedzi na temat planu zdrowotnego lub dawcecilia corral. Rakel skorzystaæ z pomocy t³umacza znaj¹cego malia pearson¿y zadzwoniæ pod numer [phone]. Ta us³uga jest bezp³atna.  Malawian: ????? ??????? ????????????????????? ??????????????????????????????????[phone] ???????????????? ? ????????????????? ?????

## 2025-06-11 ENCOUNTER — HOSPITAL ENCOUNTER (OUTPATIENT)
Facility: MEDICAL CENTER | Age: 89
End: 2025-06-11
Attending: PHYSICIAN ASSISTANT
Payer: MEDICARE

## 2025-06-11 LAB
ALBUMIN SERPL BCP-MCNC: 4.2 G/DL (ref 3.2–4.9)
ALBUMIN/GLOB SERPL: 1.8 G/DL
ALP SERPL-CCNC: 103 U/L (ref 30–99)
ALT SERPL-CCNC: 11 U/L (ref 2–50)
ANION GAP SERPL CALC-SCNC: 13 MMOL/L (ref 7–16)
AST SERPL-CCNC: 18 U/L (ref 12–45)
BILIRUB SERPL-MCNC: 0.6 MG/DL (ref 0.1–1.5)
BUN SERPL-MCNC: 22 MG/DL (ref 8–22)
CALCIUM ALBUM COR SERPL-MCNC: 9 MG/DL (ref 8.5–10.5)
CALCIUM SERPL-MCNC: 9.2 MG/DL (ref 8.5–10.5)
CHLORIDE SERPL-SCNC: 104 MMOL/L (ref 96–112)
CO2 SERPL-SCNC: 21 MMOL/L (ref 20–33)
CREAT SERPL-MCNC: 0.87 MG/DL (ref 0.5–1.4)
GFR SERPLBLD CREATININE-BSD FMLA CKD-EPI: 83 ML/MIN/1.73 M 2
GLOBULIN SER CALC-MCNC: 2.4 G/DL (ref 1.9–3.5)
GLUCOSE SERPL-MCNC: 114 MG/DL (ref 65–99)
POTASSIUM SERPL-SCNC: 4.1 MMOL/L (ref 3.6–5.5)
PROT SERPL-MCNC: 6.6 G/DL (ref 6–8.2)
PSA SERPL-MCNC: 7.6 NG/ML (ref 0–4)
SODIUM SERPL-SCNC: 138 MMOL/L (ref 135–145)
TESTOST SERPL-MCNC: <3 NG/DL (ref 175–781)

## 2025-06-11 PROCEDURE — 80053 COMPREHEN METABOLIC PANEL: CPT

## 2025-06-11 PROCEDURE — 84403 ASSAY OF TOTAL TESTOSTERONE: CPT

## 2025-06-11 PROCEDURE — 84153 ASSAY OF PSA TOTAL: CPT

## 2025-06-11 PROCEDURE — 36415 COLL VENOUS BLD VENIPUNCTURE: CPT

## 2025-06-22 ENCOUNTER — NON-PROVIDER VISIT (OUTPATIENT)
Dept: CARDIOLOGY | Facility: MEDICAL CENTER | Age: 89
End: 2025-06-22
Payer: MEDICARE

## 2025-06-23 PROCEDURE — 93294 REM INTERROG EVL PM/LDLS PM: CPT | Performed by: INTERNAL MEDICINE

## 2025-06-26 ENCOUNTER — TELEPHONE (OUTPATIENT)
Dept: HEALTH INFORMATION MANAGEMENT | Facility: OTHER | Age: 89
End: 2025-06-26

## 2025-06-26 NOTE — Clinical Note
Katharine and Dr. Cote,  I saw your recent conversation regarding A-fib with David.  See scanned EKG from today.  I am not completely sure in my interpretation but it does appear that there is some irregularity consistent with possible A-fib underlying the AV dual placed complexes.  Encouraged him to call for an appointment with cardiology ASAP.  I did not start anticoagulation but I can if that is your recommendation.  See my note today for more details.  Thanks, Jovani Flores
[phone]

## (undated) DEVICE — CANISTER SUCTION 3000ML MECHANICAL FILTER AUTO SHUTOFF MEDI-VAC NONSTERILE LF DISP  (40EA/CA)

## (undated) DEVICE — TOWEL STOP TIMEOUT SAFETY FLAG (40EA/CA)

## (undated) DEVICE — COVER LIGHT HANDLE FLEXIBLE - SOFT (2EA/PK 80PK/CA)

## (undated) DEVICE — ELECTRODE DUAL RETURN W/ CORD - (50/PK)

## (undated) DEVICE — SUTURE 1 PDS-2 PLUS CTX - (24/BX)

## (undated) DEVICE — SEAL CANNULA STAPLER 12 MM (10EA/BX)

## (undated) DEVICE — ROBOTIC SURGERY SERVICES

## (undated) DEVICE — GLOVE BIOGEL SZ 7 SURGICAL PF LTX - (50PR/BX 4BX/CA)

## (undated) DEVICE — CHLORAPREP 26 ML APPLICATOR - ORANGE TINT(25/CA)

## (undated) DEVICE — PACK MAJOR BASIN - (2EA/CA)

## (undated) DEVICE — GLOVE SZ 6.5 BIOGEL PI MICRO - PF LF (50PR/BX)

## (undated) DEVICE — TUBE E-T HI-LO CUFF 7.5MM (10EA/PK)

## (undated) DEVICE — SUTURE2-0 27IN VCRL ANTI VIOL (36PK/BX)

## (undated) DEVICE — GOWN WARMING STANDARD FLEX - (30/CA)

## (undated) DEVICE — NEEDLE INSFL 120MM 14GA VRRS - (20/BX)

## (undated) DEVICE — DRAPE COLUMN  BOX OF 20

## (undated) DEVICE — SET EXTENSION WITH 2 PORTS (48EA/CA) ***PART #2C8610 IS A SUBSTITUTE*****

## (undated) DEVICE — DERMABOND ADVANCED - (12EA/BX)

## (undated) DEVICE — DRAPE LAPAROTOMY T SHEET - (12EA/CA)

## (undated) DEVICE — COVER LIGHT HANDLE ALC PLUS DISP (18EA/BX)

## (undated) DEVICE — SEAL 5MM-8MM UNIVERSAL  BOX OF 10

## (undated) DEVICE — GLOVE BIOGEL PI INDICATOR SZ 7.0 SURGICAL PF LF - (50/BX 4BX/CA)

## (undated) DEVICE — LACTATED RINGERS INJ 1000 ML - (14EA/CA 60CA/PF)

## (undated) DEVICE — TOWELS CLOTH SURGICAL - (4/PK 20PK/CA)

## (undated) DEVICE — TUBING CLEARLINK DUO-VENT - C-FLO (48EA/CA)

## (undated) DEVICE — SUTURE GENERAL

## (undated) DEVICE — SPONGE GAUZESTER 4 X 4 4PLY - (128PK/CA)

## (undated) DEVICE — SUTURE 4-0 MONOCRYL PLUS PS-2 - 27 INCH (36/BX)

## (undated) DEVICE — SUTURE 2-0 20CM STRATAFIX SPIRAL SH NEEDLE (12/BX)

## (undated) DEVICE — GLOVE BIOGEL SZ 8 SURGICAL PF LTX - (50PR/BX 4BX/CA)

## (undated) DEVICE — STAPLER SKIN DISP - (6/BX 10BX/CA) VISISTAT

## (undated) DEVICE — SENSOR OXIMETER ADULT SPO2 RD SET (20EA/BX)

## (undated) DEVICE — SLEEVE, VASO, THIGH, MED

## (undated) DEVICE — SUCTION INSTRUMENT YANKAUER BULBOUS TIP W/O VENT (50EA/CA)

## (undated) DEVICE — SET LEADWIRE 5 LEAD BEDSIDE DISPOSABLE ECG (1SET OF 5/EA)

## (undated) DEVICE — DRAPE IOBAN II INCISE 23X17 - (10EA/BX 4BX/CA)

## (undated) DEVICE — SOLUTION PREP PVP IODINE 3/4 OZ POUCH PACKET CONTAINER STERILE LATEX FREE

## (undated) DEVICE — SODIUM CHL IRRIGATION 0.9% 1000ML (12EA/CA)

## (undated) DEVICE — STAPLE 75MM LINEAR (12EA/BX)

## (undated) DEVICE — DEVICE CLOSURE ABSORBABLE SUTURE VLOC TAPER GS 22 12IN (12EA/CT)

## (undated) DEVICE — STAPLER 75MM LINEAR OPEN (3EA/BX)

## (undated) DEVICE — DRAPE ARM  BOX OF 20

## (undated) DEVICE — SUTURE 4-0 MONOCRYL PLUS PS-1 - 27 INCH (36/BX)

## (undated) DEVICE — BINDER ABDOMINAL 30X45X12IN - FITS 30-45IN WAIST 12IN HIGH

## (undated) DEVICE — Device

## (undated) DEVICE — COVER TIP ENDOWRIST HOT SHEAR - (10EA/BX) DA VINCI

## (undated) DEVICE — SYSTEM CLEARIFY VISUALIZATION (10EA/PK)

## (undated) DEVICE — STAPLER 60MM OPEN GREEN 4.8 - W/STAPLE (3/BX)

## (undated) DEVICE — OBTURATOR BLADELESS STANDARD 8MM (6EA/BX)